# Patient Record
Sex: MALE | Race: WHITE | NOT HISPANIC OR LATINO | Employment: OTHER | URBAN - METROPOLITAN AREA
[De-identification: names, ages, dates, MRNs, and addresses within clinical notes are randomized per-mention and may not be internally consistent; named-entity substitution may affect disease eponyms.]

---

## 2017-02-27 ENCOUNTER — DOCUMENTATION (OUTPATIENT)
Dept: NEUROSURGERY | Facility: CLINIC | Age: 72
End: 2017-02-27

## 2017-02-27 DIAGNOSIS — M48.062 SPINAL STENOSIS, LUMBAR REGION, WITH NEUROGENIC CLAUDICATION: Primary | ICD-10-CM

## 2017-02-27 NOTE — PROGRESS NOTES
Iman, we saw Mr. Main a few years ago with severe spinal stenosis.  He call me at home yesterday and is having worsening symptoms.  I have ordered an MRI scan and I need you to call him to get things scheduled please.  Right now he is fiancé is recuperating from foot surgery so this may have to take place in the upcoming few weeks.  But if you could check on it and get him scheduled when he is able thank you, DC

## 2017-02-27 NOTE — PROGRESS NOTES
Patient is a 71-year-old white male who was seen a few years ago in the neurosurgery office with complaints of back and leg pain.  His workup at that time revealed spinal stenosis with symptoms of neurogenic claudication.  Conservative treatment was elected for the patient secondary to his cardiac pulmonary and GI status at the time.  The patient has called stating that he has been medically stable except for chronic anemia and has progressed with worsening back and leg pain with walking.  He is currently using a walker for assistance but only able to walk approximately 10 steps without having to stop.  Our plan would be to obtain a new MRI scan of the lumbar spine and see the patient in the office for reevaluation.    Malina Irving PA-C

## 2017-03-06 ENCOUNTER — HOSPITAL ENCOUNTER (OUTPATIENT)
Dept: MRI IMAGING | Facility: HOSPITAL | Age: 72
Discharge: HOME | End: 2017-03-06
Admitting: PHYSICIAN ASSISTANT

## 2017-03-06 ENCOUNTER — PREP FOR SURGERY (OUTPATIENT)
Dept: NEUROSURGERY | Facility: CLINIC | Age: 72
End: 2017-03-06

## 2017-03-06 ENCOUNTER — OFFICE VISIT (OUTPATIENT)
Dept: NEUROSURGERY | Facility: CLINIC | Age: 72
End: 2017-03-06

## 2017-03-06 VITALS
HEIGHT: 72 IN | BODY MASS INDEX: 33.4 KG/M2 | SYSTOLIC BLOOD PRESSURE: 180 MMHG | TEMPERATURE: 97.1 F | DIASTOLIC BLOOD PRESSURE: 70 MMHG | WEIGHT: 246.6 LBS

## 2017-03-06 DIAGNOSIS — M48.062 SPINAL STENOSIS, LUMBAR REGION, WITH NEUROGENIC CLAUDICATION: Primary | ICD-10-CM

## 2017-03-06 DIAGNOSIS — M51.36 DEGENERATIVE LUMBAR DISC: ICD-10-CM

## 2017-03-06 DIAGNOSIS — M48.061 SPINAL STENOSIS, LUMBAR: Primary | ICD-10-CM

## 2017-03-06 PROCEDURE — 72148 MRI LUMBAR SPINE W/O DYE: CPT

## 2017-03-06 PROCEDURE — 99214 OFFICE O/P EST MOD 30 MIN: CPT | Performed by: NEUROLOGICAL SURGERY

## 2017-03-06 RX ORDER — RANITIDINE 150 MG/1
150 TABLET ORAL 2 TIMES DAILY
Status: ON HOLD | COMMUNITY
End: 2022-11-30

## 2017-03-06 RX ORDER — GABAPENTIN 300 MG/1
300 CAPSULE ORAL 3 TIMES DAILY
COMMUNITY
End: 2018-07-31

## 2017-03-06 RX ORDER — HYDROCODONE BITARTRATE AND ACETAMINOPHEN 7.5; 325 MG/1; MG/1
1 TABLET ORAL ONCE
Status: CANCELLED | OUTPATIENT
Start: 2017-03-06

## 2017-03-06 RX ORDER — PANTOPRAZOLE SODIUM 40 MG/1
40 TABLET, DELAYED RELEASE ORAL 2 TIMES DAILY
COMMUNITY

## 2017-03-06 RX ORDER — SODIUM CHLORIDE, SODIUM LACTATE, POTASSIUM CHLORIDE, CALCIUM CHLORIDE 600; 310; 30; 20 MG/100ML; MG/100ML; MG/100ML; MG/100ML
100 INJECTION, SOLUTION INTRAVENOUS CONTINUOUS
Status: CANCELLED | OUTPATIENT
Start: 2017-03-06

## 2017-03-06 RX ORDER — ACETAMINOPHEN 325 MG/1
650 TABLET ORAL ONCE
Status: CANCELLED | OUTPATIENT
Start: 2017-03-06 | End: 2017-03-06

## 2017-03-06 RX ORDER — OXYBUTYNIN CHLORIDE 5 MG/1
5 TABLET ORAL NIGHTLY
Status: ON HOLD | COMMUNITY
End: 2022-11-30

## 2017-03-06 RX ORDER — IBUPROFEN 200 MG
800 TABLET ORAL ONCE
Status: CANCELLED | OUTPATIENT
Start: 2017-03-06 | End: 2017-03-06

## 2017-03-06 RX ORDER — HYDROCODONE BITARTRATE AND ACETAMINOPHEN 7.5; 325 MG/1; MG/1
1 TABLET ORAL EVERY 6 HOURS PRN
Qty: 45 TABLET | Refills: 0 | Status: SHIPPED | OUTPATIENT
Start: 2017-03-06 | End: 2017-04-19 | Stop reason: SDUPTHER

## 2017-03-06 RX ORDER — CHLORHEXIDINE GLUCONATE 4 G/100ML
SOLUTION TOPICAL
Qty: 120 ML | Refills: 0 | Status: SHIPPED | OUTPATIENT
Start: 2017-03-06 | End: 2018-05-08

## 2017-03-06 NOTE — PROGRESS NOTES
"Sai Main  1945  [id number]      Chief Complaint   Patient presents with   • Back Pain      hips   • Leg Pain       HISTORY OF PRESENT ILLNESS:   This is a 71-year-old male who is well known to our service who presents with increasing neurogenic claudication.  He was last seen 8/26/2014 at which time it was recommended that he undergo a laminectomy from L1 to S1.  He did not wish to pursue surgery at that time, however his symptoms have gotten to the point that he wishes to do so.  He has significant claudication in both lower extremities.    He has a recent only undergone infection in his leg of MRSA.  He apparently is \"clean\" at this time.  He has concurrent illnesses which increase the risk of laminectomy including congestive heart failure, diabetes and COPD.  I reviewed all the risks of surgery with him the most significant of which is MRSA infection    Past Medical History   Diagnosis Date   • Anemia    • Arthritis    • Bleeding disorder    • CHF (congestive heart failure)    • Diabetes mellitus    • Hernia    • History of transfusion    • Hypertension    • Low back pain        Past Surgical History   Procedure Laterality Date   • Cervical spine surgery         Family History   Problem Relation Age of Onset   • Heart disease Mother    • Heart disease Father        Social History     Social History   • Marital status:      Spouse name: N/A   • Number of children: N/A   • Years of education: N/A     Occupational History   • Not on file.     Social History Main Topics   • Smoking status: Former Smoker   • Smokeless tobacco: Not on file   • Alcohol use Yes      Comment: rarely   • Drug use: No   • Sexual activity: Defer     Other Topics Concern   • Not on file     Social History Narrative   • No narrative on file       Allergies   Allergen Reactions   • Levaquin [Levofloxacin]    • Morphine And Related          Current Outpatient Prescriptions:   •  gabapentin (NEURONTIN) 300 MG capsule, Take 300 " mg by mouth 3 (Three) Times a Day., Disp: , Rfl:   •  oxybutynin (DITROPAN) 5 MG tablet, Take 5 mg by mouth 3 (Three) Times a Day., Disp: , Rfl:   •  pantoprazole (PROTONIX) 40 MG EC tablet, Take 40 mg by mouth Daily., Disp: , Rfl:   •  raNITIdine (ZANTAC) 150 MG tablet, Take 150 mg by mouth 2 (Two) Times a Day., Disp: , Rfl:   •  fluconazole (DIFLUCAN) 100 MG tablet, Take 100 mg by mouth Daily., Disp: , Rfl:   •  sodium-potassium bicarbonate (JERICHO-SELTZER GOLD) effervescent tablet, Take 2 tablets by mouth Every 4 (Four) Hours As Needed., Disp: , Rfl:     Review of Systems   Constitutional: Positive for activity change and fatigue. Negative for appetite change, chills, diaphoresis, fever and unexpected weight change.   HENT: Positive for congestion, postnasal drip, rhinorrhea and tinnitus. Negative for dental problem, drooling, ear discharge, ear pain, facial swelling, hearing loss, mouth sores, nosebleeds, sinus pressure, sneezing, sore throat, trouble swallowing and voice change.    Eyes: Positive for photophobia. Negative for pain, discharge, redness, itching and visual disturbance.   Respiratory: Positive for cough, shortness of breath and wheezing. Negative for apnea, choking, chest tightness and stridor.    Cardiovascular: Negative for chest pain, palpitations and leg swelling.   Gastrointestinal: Positive for blood in stool, constipation and diarrhea. Negative for abdominal distention, abdominal pain, anal bleeding, nausea, rectal pain and vomiting.   Endocrine: Positive for cold intolerance and heat intolerance. Negative for polydipsia, polyphagia and polyuria.   Genitourinary: Positive for difficulty urinating, frequency and penile pain. Negative for decreased urine volume, dysuria, enuresis, flank pain, genital sores, hematuria and urgency.   Musculoskeletal: Positive for arthralgias, back pain, myalgias, neck pain and neck stiffness. Negative for gait problem and joint swelling.   Skin: Positive for rash.  "Negative for color change, pallor and wound.   Allergic/Immunologic: Positive for environmental allergies. Negative for food allergies and immunocompromised state.   Neurological: Positive for light-headedness. Negative for dizziness, tremors, seizures, syncope, facial asymmetry, speech difficulty, weakness, numbness and headaches.   Hematological: Negative for adenopathy. Bruises/bleeds easily.   Psychiatric/Behavioral: Positive for dysphoric mood. Negative for agitation, behavioral problems, confusion, decreased concentration, hallucinations, self-injury, sleep disturbance and suicidal ideas. The patient is nervous/anxious. The patient is not hyperactive.    All other systems reviewed and are negative.      Neurological Examination:    Vitals:    03/06/17 1613   BP: 180/70   BP Location: Right arm   Patient Position: Sitting   Cuff Size: Adult   Temp: 97.1 °F (36.2 °C)   TempSrc: Temporal Artery    Weight: 246 lb 9.6 oz (112 kg)   Height: 72\" (182.9 cm)       Mental status/speech: The patient is alert and oriented.  Speech is clear without aphysia or dysarthria.  No overt cognitive deficits.    Cranial nerve examination:    Olfaction: Smell is intact.  Vision: Vision is intact without visual field abnormalities.  Funduscopic examination is normal.  No pupillary irregularity.  Ocular motor examination: The extraocular muscles are intact.  There is no diplopia.  The pupil is round and reactive to both light and accommodation.  There is no nystagmus.  Facial movement/sensation: There is no facial weakness.  Sensation is intact in the first, second, and third divisions of the trigeminal nerve.  The corneal reflex is intact.  Auditory: Hearing is intact to finger rub bilaterally.  Cranial nerves IX, X, XI, XII: Phonation is normal.  No dysphagia.  Tongue is protruded in the midline without atrophy.  The gag reflex is intact.  Shoulder shrug is normal.    Musculoligamentous ligamentous examination: [ He has virtually no " movement in his spine.  Straight leg raising causes back pain.  He is areflexic.  He has spotty sensory loss.  He has generalized deconditioning and weakness but no focality]    Medical Decision Making:     Diagnostic Data Set:  [Lumbar MRI shows high-grade spinal stenosis L2 through S1 ]      Assessment:  [Neurogenic claudication secondary to spinal stenosis ]          Recommendations:  [I've recommended laminectomies of L2, L3, L4 and possibly L5.  He will call when he is made his decision and we will proceed accordingly. ]        I greatly appreciate the opportunity to see and evaluate this individual.  If you have questions or concerns regarding issues that I may have overlooked please call me at any time: [phone].  Nikolay Cantu M.D.  [facility]  [address] [place] [address]    Scribed for Jose Cantu MD by Ross Ash CMA. 3/6/2017  4:47 PM     I have read and concur with the information provided by the scribe.  Jose Cantu MD

## 2017-03-06 NOTE — H&P
"Sai Main  1945  [id number]             Chief Complaint   Patient presents with   • Back Pain       hips   • Leg Pain         HISTORY OF PRESENT ILLNESS: This is a 71-year-old male who is well known to our service who presents with increasing neurogenic claudication. He was last seen 8/26/2014 at which time it was recommended that he undergo a laminectomy from L1 to S1. He did not wish to pursue surgery at that time, however his symptoms have gotten to the point that he wishes to do so. He has significant claudication in both lower extremities.     He has a recent only undergone infection in his leg of MRSA. He apparently is \"clean\" at this time. He has concurrent illnesses which increase the risk of laminectomy including congestive heart failure, diabetes and COPD. I reviewed all the risks of surgery with him the most significant of which is MRSA infection      Medical History         Past Medical History   Diagnosis Date   • Anemia     • Arthritis     • Bleeding disorder     • CHF (congestive heart failure)     • Diabetes mellitus     • Hernia     • History of transfusion     • Hypertension     • Low back pain               Surgical History          Past Surgical History   Procedure Laterality Date   • Cervical spine surgery                      Family History   Problem Relation Age of Onset   • Heart disease Mother     • Heart disease Father            Social History    Social History            Social History   • Marital status:        Spouse name: N/A   • Number of children: N/A   • Years of education: N/A          Occupational History   • Not on file.              Social History Main Topics    • Smoking status: Former Smoker    • Smokeless tobacco: Not on file    • Alcohol use Yes         Comment: rarely    • Drug use: No    • Sexual activity: Defer            Other Topics Concern   • Not on file          Social History Narrative   • No narrative on file                 Allergies   Allergen " Reactions   • Levaquin [Levofloxacin]     • Morphine And Related              Current Outpatient Prescriptions:   • gabapentin (NEURONTIN) 300 MG capsule, Take 300 mg by mouth 3 (Three) Times a Day., Disp: , Rfl:   • oxybutynin (DITROPAN) 5 MG tablet, Take 5 mg by mouth 3 (Three) Times a Day., Disp: , Rfl:   • pantoprazole (PROTONIX) 40 MG EC tablet, Take 40 mg by mouth Daily., Disp: , Rfl:   • raNITIdine (ZANTAC) 150 MG tablet, Take 150 mg by mouth 2 (Two) Times a Day., Disp: , Rfl:   • fluconazole (DIFLUCAN) 100 MG tablet, Take 100 mg by mouth Daily., Disp: , Rfl:   • sodium-potassium bicarbonate (JERICHO-SELTZER GOLD) effervescent tablet, Take 2 tablets by mouth Every 4 (Four) Hours As Needed., Disp: , Rfl:      Review of Systems   Constitutional: Positive for activity change and fatigue. Negative for appetite change, chills, diaphoresis, fever and unexpected weight change.   HENT: Positive for congestion, postnasal drip, rhinorrhea and tinnitus. Negative for dental problem, drooling, ear discharge, ear pain, facial swelling, hearing loss, mouth sores, nosebleeds, sinus pressure, sneezing, sore throat, trouble swallowing and voice change.   Eyes: Positive for photophobia. Negative for pain, discharge, redness, itching and visual disturbance.   Respiratory: Positive for cough, shortness of breath and wheezing. Negative for apnea, choking, chest tightness and stridor.   Cardiovascular: Negative for chest pain, palpitations and leg swelling.   Gastrointestinal: Positive for blood in stool, constipation and diarrhea. Negative for abdominal distention, abdominal pain, anal bleeding, nausea, rectal pain and vomiting.   Endocrine: Positive for cold intolerance and heat intolerance. Negative for polydipsia, polyphagia and polyuria.   Genitourinary: Positive for difficulty urinating, frequency and penile pain. Negative for decreased urine volume, dysuria, enuresis, flank pain, genital sores, hematuria and urgency.  "  Musculoskeletal: Positive for arthralgias, back pain, myalgias, neck pain and neck stiffness. Negative for gait problem and joint swelling.   Skin: Positive for rash. Negative for color change, pallor and wound.   Allergic/Immunologic: Positive for environmental allergies. Negative for food allergies and immunocompromised state.   Neurological: Positive for light-headedness. Negative for dizziness, tremors, seizures, syncope, facial asymmetry, speech difficulty, weakness, numbness and headaches.   Hematological: Negative for adenopathy. Bruises/bleeds easily.   Psychiatric/Behavioral: Positive for dysphoric mood. Negative for agitation, behavioral problems, confusion, decreased concentration, hallucinations, self-injury, sleep disturbance and suicidal ideas. The patient is nervous/anxious. The patient is not hyperactive.   All other systems reviewed and are negative.        Neurological Examination:      Vitals        Vitals:     03/06/17 1613   BP: 180/70   BP Location: Right arm   Patient Position: Sitting   Cuff Size: Adult   Temp: 97.1 °F (36.2 °C)   TempSrc: Temporal Artery    Weight: 246 lb 9.6 oz (112 kg)   Height: 72\" (182.9 cm)            Mental status/speech: The patient is alert and oriented. Speech is clear without aphysia or dysarthria. No overt cognitive deficits.     Cranial nerve examination:     Olfaction: Smell is intact.  Vision: Vision is intact without visual field abnormalities. Funduscopic examination is normal. No pupillary irregularity.  Ocular motor examination: The extraocular muscles are intact. There is no diplopia. The pupil is round and reactive to both light and accommodation. There is no nystagmus.  Facial movement/sensation: There is no facial weakness. Sensation is intact in the first, second, and third divisions of the trigeminal nerve. The corneal reflex is intact.  Auditory: Hearing is intact to finger rub bilaterally.  Cranial nerves IX, X, XI, XII: Phonation is normal. No " dysphagia. Tongue is protruded in the midline without atrophy. The gag reflex is intact. Shoulder shrug is normal.     Musculoligamentous ligamentous examination: [ He has virtually no movement in his spine. Straight leg raising causes back pain. He is areflexic. He has spotty sensory loss. He has generalized deconditioning and weakness but no focality     Medical Decision Making:     Diagnostic Data Set: Lumbar MRI shows high-grade spinal stenosis L2 through S1      Assessment: Neurogenic claudication secondary to spinal stenosis       Recommendations: I've recommended laminectomies of L2, L3, L4 and possibly L5. He will call when he is made his decision and we will proceed accordingly.

## 2017-03-21 ENCOUNTER — TELEPHONE (OUTPATIENT)
Dept: NEUROSURGERY | Facility: CLINIC | Age: 72
End: 2017-03-21

## 2017-03-21 NOTE — TELEPHONE ENCOUNTER
Provider:  Pepe  Caller: Patient  Time of call:   3:46  Phone #:[phone]  Surgery:    Surgery Date:    Last visit:   03/06/17    ANILA:         Reason for call:    Patient states that he still wants to have surgery, however he has to post-pone it until after a wedding.  He said he would be available around the 3rd week of June.     Coco in scheduling notified.

## 2017-04-19 DIAGNOSIS — M48.061 SPINAL STENOSIS, LUMBAR: ICD-10-CM

## 2017-04-19 DIAGNOSIS — M51.36 DEGENERATIVE LUMBAR DISC: ICD-10-CM

## 2017-04-19 RX ORDER — HYDROCODONE BITARTRATE AND ACETAMINOPHEN 7.5; 325 MG/1; MG/1
1 TABLET ORAL EVERY 6 HOURS PRN
Qty: 45 TABLET | Refills: 0 | Status: SHIPPED | OUTPATIENT
Start: 2017-04-19 | End: 2018-05-08

## 2017-04-19 RX ORDER — BISACODYL 5 MG/1
5 TABLET, DELAYED RELEASE ORAL DAILY PRN
Qty: 30 TABLET | Refills: 0 | Status: SHIPPED | OUTPATIENT
Start: 2017-04-19 | End: 2018-05-08

## 2017-04-19 NOTE — TELEPHONE ENCOUNTER
Provider:  Pepe  Caller: Sai  Time of call:   10:32  Phone #:  [phone]  Surgery:  Lumbar Decompression L5-S1  Surgery Date:  Unknown date  Last visit:   3/6/17  Next visit: not scheduled    ANILA:       03/07/2017 Hydrocodone/Acetaminophen  325MG/7.5MG  1945 [address] Jose Cantu [facility] [phone]  [facility] [address]    Reason for call:       Refill on Hydrocodone, pt also requesting laxative for constipation from pain medication

## 2017-05-16 ENCOUNTER — TELEPHONE (OUTPATIENT)
Dept: NEUROSURGERY | Facility: CLINIC | Age: 72
End: 2017-05-16

## 2017-05-19 ENCOUNTER — OFFICE VISIT (OUTPATIENT)
Dept: NEUROLOGY | Facility: CLINIC | Age: 72
End: 2017-05-19

## 2017-05-19 ENCOUNTER — DOCUMENTATION (OUTPATIENT)
Dept: NEUROLOGY | Facility: CLINIC | Age: 72
End: 2017-05-19

## 2017-05-19 VITALS
WEIGHT: 259 LBS | BODY MASS INDEX: 35.08 KG/M2 | DIASTOLIC BLOOD PRESSURE: 58 MMHG | HEIGHT: 72 IN | SYSTOLIC BLOOD PRESSURE: 120 MMHG

## 2017-05-19 DIAGNOSIS — R47.89 EPISODE OF CHANGE IN SPEECH: Primary | ICD-10-CM

## 2017-05-19 PROCEDURE — 99204 OFFICE O/P NEW MOD 45 MIN: CPT | Performed by: PHYSICIAN ASSISTANT

## 2017-05-19 RX ORDER — GABAPENTIN 300 MG/1
300 CAPSULE ORAL 2 TIMES DAILY
COMMUNITY
End: 2018-07-05 | Stop reason: HOSPADM

## 2017-05-19 RX ORDER — GLYBURIDE-METFORMIN HYDROCHLORIDE 5; 500 MG/1; MG/1
2 TABLET ORAL 2 TIMES DAILY WITH MEALS
COMMUNITY

## 2017-05-19 RX ORDER — SIMVASTATIN 80 MG
80 TABLET ORAL NIGHTLY
COMMUNITY
Start: 2017-03-06

## 2017-05-19 RX ORDER — SERTRALINE HYDROCHLORIDE 100 MG/1
150 TABLET, FILM COATED ORAL NIGHTLY
COMMUNITY
End: 2018-07-31

## 2017-05-19 RX ORDER — ZOLPIDEM TARTRATE 10 MG/1
10 TABLET ORAL NIGHTLY PRN
COMMUNITY
Start: 2017-04-21

## 2017-05-19 RX ORDER — NABUMETONE 750 MG/1
750 TABLET, FILM COATED ORAL 2 TIMES DAILY PRN
COMMUNITY
Start: 2017-03-06

## 2017-05-19 RX ORDER — FUROSEMIDE 20 MG/1
20 TABLET ORAL DAILY
COMMUNITY

## 2017-05-19 RX ORDER — SERTRALINE HYDROCHLORIDE 100 MG/1
100 TABLET, FILM COATED ORAL EVERY MORNING
COMMUNITY

## 2017-05-19 RX ORDER — CARVEDILOL 3.12 MG/1
3.12 TABLET ORAL 2 TIMES DAILY WITH MEALS
COMMUNITY

## 2017-05-19 RX ORDER — FINASTERIDE 5 MG/1
5 TABLET, FILM COATED ORAL DAILY
Status: ON HOLD | COMMUNITY
End: 2022-11-30

## 2017-05-19 RX ORDER — TAMSULOSIN HYDROCHLORIDE 0.4 MG/1
1 CAPSULE ORAL 2 TIMES DAILY
COMMUNITY

## 2017-05-19 RX ORDER — LISINOPRIL 20 MG/1
20 TABLET ORAL DAILY
Status: ON HOLD | COMMUNITY
End: 2022-11-30

## 2017-05-22 ENCOUNTER — TELEPHONE (OUTPATIENT)
Dept: NEUROLOGY | Facility: CLINIC | Age: 72
End: 2017-05-22

## 2017-06-16 ENCOUNTER — APPOINTMENT (OUTPATIENT)
Dept: PREADMISSION TESTING | Facility: HOSPITAL | Age: 72
End: 2017-06-16

## 2018-04-18 DIAGNOSIS — M54.10 RADICULAR PAIN OF BOTH LOWER EXTREMITIES: ICD-10-CM

## 2018-04-18 DIAGNOSIS — G89.29 CHRONIC BILATERAL LOW BACK PAIN WITH BILATERAL SCIATICA: ICD-10-CM

## 2018-04-18 DIAGNOSIS — M48.062 SPINAL STENOSIS, LUMBAR REGION, WITH NEUROGENIC CLAUDICATION: Primary | ICD-10-CM

## 2018-04-18 DIAGNOSIS — M51.36 DDD (DEGENERATIVE DISC DISEASE), LUMBAR: ICD-10-CM

## 2018-04-18 DIAGNOSIS — M54.41 CHRONIC BILATERAL LOW BACK PAIN WITH BILATERAL SCIATICA: ICD-10-CM

## 2018-04-18 DIAGNOSIS — M19.90 ARTHRITIS: ICD-10-CM

## 2018-04-18 DIAGNOSIS — M47.16 OSTEOARTHRITIS OF LUMBAR SPINE WITH MYELOPATHY: ICD-10-CM

## 2018-04-18 DIAGNOSIS — M54.42 CHRONIC BILATERAL LOW BACK PAIN WITH BILATERAL SCIATICA: ICD-10-CM

## 2018-04-26 ENCOUNTER — OFFICE VISIT (OUTPATIENT)
Dept: NEUROSURGERY | Facility: CLINIC | Age: 73
End: 2018-04-26

## 2018-04-26 ENCOUNTER — APPOINTMENT (OUTPATIENT)
Dept: MRI IMAGING | Facility: HOSPITAL | Age: 73
End: 2018-04-26

## 2018-04-26 ENCOUNTER — HOSPITAL ENCOUNTER (OUTPATIENT)
Dept: MRI IMAGING | Facility: HOSPITAL | Age: 73
Discharge: HOME | End: 2018-04-26
Admitting: PHYSICIAN ASSISTANT

## 2018-04-26 ENCOUNTER — HOSPITAL ENCOUNTER (OUTPATIENT)
Dept: GENERAL RADIOLOGY | Facility: HOSPITAL | Age: 73
Discharge: HOME | End: 2018-04-26

## 2018-04-26 VITALS
SYSTOLIC BLOOD PRESSURE: 160 MMHG | DIASTOLIC BLOOD PRESSURE: 80 MMHG | WEIGHT: 266 LBS | TEMPERATURE: 97.3 F | BODY MASS INDEX: 36.03 KG/M2 | HEIGHT: 72 IN

## 2018-04-26 DIAGNOSIS — M48.062 SPINAL STENOSIS OF LUMBAR REGION WITH NEUROGENIC CLAUDICATION: Primary | ICD-10-CM

## 2018-04-26 DIAGNOSIS — M48.062 SPINAL STENOSIS, LUMBAR REGION, WITH NEUROGENIC CLAUDICATION: ICD-10-CM

## 2018-04-26 DIAGNOSIS — M51.36 DDD (DEGENERATIVE DISC DISEASE), LUMBAR: ICD-10-CM

## 2018-04-26 DIAGNOSIS — M54.41 CHRONIC BILATERAL LOW BACK PAIN WITH BILATERAL SCIATICA: ICD-10-CM

## 2018-04-26 DIAGNOSIS — M54.42 CHRONIC BILATERAL LOW BACK PAIN WITH BILATERAL SCIATICA: ICD-10-CM

## 2018-04-26 DIAGNOSIS — G89.29 CHRONIC BILATERAL LOW BACK PAIN WITH BILATERAL SCIATICA: ICD-10-CM

## 2018-04-26 DIAGNOSIS — M47.16 OSTEOARTHRITIS OF LUMBAR SPINE WITH MYELOPATHY: ICD-10-CM

## 2018-04-26 DIAGNOSIS — M19.90 ARTHRITIS: ICD-10-CM

## 2018-04-26 DIAGNOSIS — M54.10 RADICULAR PAIN OF BOTH LOWER EXTREMITIES: ICD-10-CM

## 2018-04-26 PROCEDURE — 72114 X-RAY EXAM L-S SPINE BENDING: CPT

## 2018-04-26 PROCEDURE — 99213 OFFICE O/P EST LOW 20 MIN: CPT | Performed by: NEUROLOGICAL SURGERY

## 2018-04-26 PROCEDURE — 72148 MRI LUMBAR SPINE W/O DYE: CPT

## 2018-04-26 NOTE — PROGRESS NOTES
Sai Main  1945  [id number]                       CURRENT WORKING DIAGNOSIS:  Neurogenic claudication          MEDICAL HISTORY SINCE LAST ENCOUNTER:  This is a 72-year-old male we have seen in the past with spinal stenosis manifest as neurogenic claudication.  We have recommended laminectomies in the past.  He is referred non-surgical treatment.  He presents now with increasing symptoms            Past Medical History:   Diagnosis Date   • Anemia    • Arthritis    • Bleeding disorder    • CHF (congestive heart failure)    • DDD (degenerative disc disease), lumbar    • Diabetes mellitus    • DJD (degenerative joint disease), lumbar    • Hernia    • History of transfusion    • Hypertension    • Low back pain    • Neurogenic claudication due to lumbar spinal stenosis    • Radiculopathy with lower extremity symptoms    • Spinal stenosis of lumbar region 2014              Past Surgical History:   Procedure Laterality Date   • CERVICAL SPINE SURGERY                Family History   Problem Relation Age of Onset   • Heart disease Mother    • Heart disease Father               Social History     Social History   • Marital status: Significant Other     Spouse name: N/A   • Number of children: N/A   • Years of education: N/A     Occupational History   • Not on file.     Social History Main Topics   • Smoking status: Former Smoker   • Smokeless tobacco: Not on file   • Alcohol use Yes      Comment: rarely   • Drug use: No   • Sexual activity: Defer     Other Topics Concern   • Not on file     Social History Narrative   • No narrative on file              Allergies   Allergen Reactions   • Levaquin [Levofloxacin]    • Morphine And Related               Current Outpatient Prescriptions:   •  bisacodyl (DULCOLAX) 5 MG EC tablet, Take 1 tablet by mouth Daily As Needed for Constipation., Disp: 30 tablet, Rfl: 0  •  carvedilol (COREG) 3.125 MG tablet, Take 3.125 mg by mouth 2 (Two) Times a Day With Meals., Disp: , Rfl:   •   chlorhexidine (HIBICLENS) 4 % external liquid, Shower each day with solution for 5 days beginning 5 days before surgery., Disp: 120 mL, Rfl: 0  •  finasteride (PROSCAR) 5 MG tablet, Take 5 mg by mouth Daily., Disp: , Rfl:   •  fluconazole (DIFLUCAN) 100 MG tablet, Take 100 mg by mouth Daily., Disp: , Rfl:   •  fluticasone (VERAMYST) 27.5 MCG/SPRAY nasal spray, 2 sprays into each nostril Daily., Disp: , Rfl:   •  furosemide (LASIX) 20 MG tablet, Take 20 mg by mouth 2 (Two) Times a Day., Disp: , Rfl:   •  gabapentin (NEURONTIN) 300 MG capsule, Take 300 mg by mouth 3 (Three) Times a Day., Disp: , Rfl:   •  gabapentin (NEURONTIN) 300 MG capsule, Take 300 mg by mouth 3 (Three) Times a Day., Disp: , Rfl:   •  glyBURIDE-metFORMIN (GLUCOVANCE) 5-500 MG per tablet, Take 1 tablet by mouth Daily With Breakfast., Disp: , Rfl:   •  HYDROcodone-acetaminophen (NORCO) 7.5-325 MG per tablet, Take 1 tablet by mouth Every 6 (Six) Hours As Needed for Moderate Pain (4-6)., Disp: 45 tablet, Rfl: 0  •  insulin NPH (humuLIN N,novoLIN N) 100 UNIT/ML injection, Inject  under the skin 2 (Two) Times a Day Before Meals., Disp: , Rfl:   •  ipratropium-albuterol (DUO-NEB) 0.5-2.5 mg/mL nebulizer, Take 3 mL by nebulization Every 4 (Four) Hours As Needed for Wheezing., Disp: , Rfl:   •  lisinopril (PRINIVIL,ZESTRIL) 20 MG tablet, Take 20 mg by mouth Daily., Disp: , Rfl:   •  nabumetone (RELAFEN) 750 MG tablet, , Disp: , Rfl:   •  oxybutynin (DITROPAN) 5 MG tablet, Take 5 mg by mouth 3 (Three) Times a Day., Disp: , Rfl:   •  pantoprazole (PROTONIX) 40 MG EC tablet, Take 40 mg by mouth Daily., Disp: , Rfl:   •  raNITIdine (ZANTAC) 150 MG tablet, Take 150 mg by mouth 2 (Two) Times a Day., Disp: , Rfl:   •  sertraline (ZOLOFT) 100 MG tablet, Take 50 mg by mouth Daily., Disp: , Rfl:   •  sertraline (ZOLOFT) 100 MG tablet, Take 100 mg by mouth Daily., Disp: , Rfl:   •  simvastatin (ZOCOR) 80 MG tablet, , Disp: , Rfl:   •  sodium-potassium bicarbonate  (JERICHO-SELTZER GOLD) effervescent tablet, Take 2 tablets by mouth Every 4 (Four) Hours As Needed., Disp: , Rfl:   •  tamsulosin (FLOMAX) 0.4 MG capsule 24 hr capsule, Take 1 capsule by mouth Every Night., Disp: , Rfl:   •  zolpidem (AMBIEN) 10 MG tablet, , Disp: , Rfl:          Review of Systems   Constitutional: Negative for activity change, appetite change, chills, diaphoresis, fatigue, fever and unexpected weight change.   HENT: Negative for congestion, dental problem, drooling, ear discharge, ear pain, facial swelling, hearing loss, mouth sores, nosebleeds, postnasal drip, rhinorrhea, sinus pressure, sneezing, sore throat, tinnitus, trouble swallowing and voice change.    Eyes: Negative for photophobia, pain, discharge, redness, itching and visual disturbance.   Respiratory: Negative for apnea, cough, choking, chest tightness, shortness of breath, wheezing and stridor.    Cardiovascular: Negative for chest pain, palpitations and leg swelling.   Gastrointestinal: Negative for abdominal distention, abdominal pain, anal bleeding, blood in stool, constipation, diarrhea, nausea, rectal pain and vomiting.   Endocrine: Negative for cold intolerance, heat intolerance, polydipsia, polyphagia and polyuria.   Genitourinary: Negative for decreased urine volume, difficulty urinating, dysuria, enuresis, flank pain, frequency, genital sores, hematuria and urgency.   Musculoskeletal: Positive for back pain, myalgias and neck stiffness. Negative for arthralgias, gait problem, joint swelling and neck pain.   Skin: Negative for color change, pallor, rash and wound.   Allergic/Immunologic: Negative for environmental allergies, food allergies and immunocompromised state.   Neurological: Positive for weakness, light-headedness and numbness. Negative for dizziness, tremors, seizures, syncope, facial asymmetry, speech difficulty and headaches.   Hematological: Negative for adenopathy. Does not bruise/bleed easily.  "  Psychiatric/Behavioral: Negative for agitation, behavioral problems, confusion, decreased concentration, dysphoric mood, hallucinations, self-injury, sleep disturbance and suicidal ideas. The patient is not nervous/anxious and is not hyperactive.                Vitals:    04/26/18 1310   BP: 160/80   Temp: 97.3 °F (36.3 °C)   Weight: 121 kg (266 lb)   Height: 182.9 cm (72.01\")               EXAMINATION: He has limitation range of motion lumbar spine.  Straight leg raise [place] and flip test are negative.  He is areflexic.  He has slight weakness of his hip flexor.  He has pain with palpation of lumbar region.            MEDICAL DECISION MAKING: The lumbar MRI shows high-grade spinal stenosis with significant degenerative osteoarthritic changes L2 through and including L5.  He has marked disc space narrowing L2-L3, L3-L4 and L5-S1 with Modic changes.           ASSESSMENT/DISPOSITION: [I've recommended laminectomies.  However I cannot give him complete assurance that it will alleviate all of his back pain.  I've suggested a second opinion with Dr. Matthews as to whether or not spinal fusion would be necessitated or simple laminectomy.  I've also referred him to Dr. dorman for pain management.  He is seeing him in the past. ]              I APPRECIATE THE OPPORTUNITY OF THIS REFERRAL. PLEASE CALL IF ANY       QUESTIONS [phone]    Scribed for Jose Cantu MD by Ross Ash CMA. 4/26/2018  1:25 PM    I have read and concur with the information provided by the scribe.  Jose Cantu MD      "

## 2018-04-26 NOTE — PROGRESS NOTES
Sai Main  1945  [id number]      Chief Complaint   Patient presents with   • Back Pain       HISTORY OF PRESENT ILLNESS:  [ ]    Past Medical History:   Diagnosis Date   • Anemia    • Arthritis    • Bleeding disorder    • CHF (congestive heart failure)    • DDD (degenerative disc disease), lumbar    • Diabetes mellitus    • DJD (degenerative joint disease), lumbar    • Hernia    • History of transfusion    • Hypertension    • Low back pain    • Neurogenic claudication due to lumbar spinal stenosis    • Radiculopathy with lower extremity symptoms    • Spinal stenosis of lumbar region 2014       Past Surgical History:   Procedure Laterality Date   • CERVICAL SPINE SURGERY         Family History   Problem Relation Age of Onset   • Heart disease Mother    • Heart disease Father        Social History     Social History   • Marital status: Significant Other     Spouse name: N/A   • Number of children: N/A   • Years of education: N/A     Occupational History   • Not on file.     Social History Main Topics   • Smoking status: Former Smoker   • Smokeless tobacco: Not on file   • Alcohol use Yes      Comment: rarely   • Drug use: No   • Sexual activity: Defer     Other Topics Concern   • Not on file     Social History Narrative   • No narrative on file       Allergies   Allergen Reactions   • Levaquin [Levofloxacin]    • Morphine And Related          Current Outpatient Prescriptions:   •  bisacodyl (DULCOLAX) 5 MG EC tablet, Take 1 tablet by mouth Daily As Needed for Constipation., Disp: 30 tablet, Rfl: 0  •  carvedilol (COREG) 3.125 MG tablet, Take 3.125 mg by mouth 2 (Two) Times a Day With Meals., Disp: , Rfl:   •  chlorhexidine (HIBICLENS) 4 % external liquid, Shower each day with solution for 5 days beginning 5 days before surgery., Disp: 120 mL, Rfl: 0  •  finasteride (PROSCAR) 5 MG tablet, Take 5 mg by mouth Daily., Disp: , Rfl:   •  fluconazole (DIFLUCAN) 100 MG tablet, Take 100 mg by mouth Daily., Disp: ,  Rfl:   •  fluticasone (VERAMYST) 27.5 MCG/SPRAY nasal spray, 2 sprays into each nostril Daily., Disp: , Rfl:   •  furosemide (LASIX) 20 MG tablet, Take 20 mg by mouth 2 (Two) Times a Day., Disp: , Rfl:   •  gabapentin (NEURONTIN) 300 MG capsule, Take 300 mg by mouth 3 (Three) Times a Day., Disp: , Rfl:   •  gabapentin (NEURONTIN) 300 MG capsule, Take 300 mg by mouth 3 (Three) Times a Day., Disp: , Rfl:   •  glyBURIDE-metFORMIN (GLUCOVANCE) 5-500 MG per tablet, Take 1 tablet by mouth Daily With Breakfast., Disp: , Rfl:   •  HYDROcodone-acetaminophen (NORCO) 7.5-325 MG per tablet, Take 1 tablet by mouth Every 6 (Six) Hours As Needed for Moderate Pain (4-6)., Disp: 45 tablet, Rfl: 0  •  insulin NPH (humuLIN N,novoLIN N) 100 UNIT/ML injection, Inject  under the skin 2 (Two) Times a Day Before Meals., Disp: , Rfl:   •  ipratropium-albuterol (DUO-NEB) 0.5-2.5 mg/mL nebulizer, Take 3 mL by nebulization Every 4 (Four) Hours As Needed for Wheezing., Disp: , Rfl:   •  lisinopril (PRINIVIL,ZESTRIL) 20 MG tablet, Take 20 mg by mouth Daily., Disp: , Rfl:   •  nabumetone (RELAFEN) 750 MG tablet, , Disp: , Rfl:   •  oxybutynin (DITROPAN) 5 MG tablet, Take 5 mg by mouth 3 (Three) Times a Day., Disp: , Rfl:   •  pantoprazole (PROTONIX) 40 MG EC tablet, Take 40 mg by mouth Daily., Disp: , Rfl:   •  raNITIdine (ZANTAC) 150 MG tablet, Take 150 mg by mouth 2 (Two) Times a Day., Disp: , Rfl:   •  sertraline (ZOLOFT) 100 MG tablet, Take 50 mg by mouth Daily., Disp: , Rfl:   •  sertraline (ZOLOFT) 100 MG tablet, Take 100 mg by mouth Daily., Disp: , Rfl:   •  simvastatin (ZOCOR) 80 MG tablet, , Disp: , Rfl:   •  sodium-potassium bicarbonate (JERICHO-SELTZER GOLD) effervescent tablet, Take 2 tablets by mouth Every 4 (Four) Hours As Needed., Disp: , Rfl:   •  tamsulosin (FLOMAX) 0.4 MG capsule 24 hr capsule, Take 1 capsule by mouth Every Night., Disp: , Rfl:   •  zolpidem (AMBIEN) 10 MG tablet, , Disp: , Rfl:     Review of Systems    Constitutional: Negative for activity change, appetite change, chills, diaphoresis, fatigue, fever and unexpected weight change.   HENT: Negative for congestion, dental problem, drooling, ear discharge, ear pain, facial swelling, hearing loss, mouth sores, nosebleeds, postnasal drip, rhinorrhea, sinus pressure, sneezing, sore throat, tinnitus, trouble swallowing and voice change.    Eyes: Negative for photophobia, pain, discharge, redness, itching and visual disturbance.   Respiratory: Negative for apnea, cough, choking, chest tightness, shortness of breath, wheezing and stridor.    Cardiovascular: Negative for chest pain, palpitations and leg swelling.   Gastrointestinal: Negative for abdominal distention, abdominal pain, anal bleeding, blood in stool, constipation, diarrhea, nausea, rectal pain and vomiting.   Endocrine: Negative for cold intolerance, heat intolerance, polydipsia, polyphagia and polyuria.   Genitourinary: Negative for decreased urine volume, difficulty urinating, dysuria, enuresis, flank pain, frequency, genital sores, hematuria and urgency.   Musculoskeletal: Positive for back pain, myalgias and neck stiffness. Negative for arthralgias, gait problem, joint swelling and neck pain.   Skin: Negative for color change, pallor, rash and wound.   Allergic/Immunologic: Negative for environmental allergies, food allergies and immunocompromised state.   Neurological: Positive for weakness, light-headedness and numbness. Negative for dizziness, tremors, seizures, syncope, facial asymmetry, speech difficulty and headaches.   Hematological: Negative for adenopathy. Does not bruise/bleed easily.   Psychiatric/Behavioral: Negative for agitation, behavioral problems, confusion, decreased concentration, dysphoric mood, hallucinations, self-injury, sleep disturbance and suicidal ideas. The patient is not nervous/anxious and is not hyperactive.        Vitals:    04/26/18 1310   BP: 160/80   Temp: 97.3 °F (36.3  "°C)   Weight: 121 kg (266 lb)   Height: 182.9 cm (72.01\")       Neurological Examination:            Medical Decision Making:     Diagnostic Data Set:  [ ]      Assessment:  [ ]          Recommendations:  [ ]        I greatly appreciate the opportunity to see and evaluate this individual.  If you have questions or concerns regarding issues that I may have overlooked please call me at any time: [phone].  Nikolay Cantu M.D.  [facility]  [address] [place] [address]    "

## 2018-04-30 ENCOUNTER — TELEPHONE (OUTPATIENT)
Dept: PAIN MEDICINE | Facility: CLINIC | Age: 73
End: 2018-04-30

## 2018-05-06 PROBLEM — M47.816 SPONDYLOSIS OF LUMBAR REGION WITHOUT MYELOPATHY OR RADICULOPATHY: Status: ACTIVE | Noted: 2018-05-06

## 2018-05-06 PROBLEM — I50.9 CONGESTIVE HEART FAILURE (HCC): Status: ACTIVE | Noted: 2018-05-06

## 2018-05-06 PROBLEM — IMO0001 IDDM (INSULIN DEPENDENT DIABETES MELLITUS): Status: ACTIVE | Noted: 2018-05-06

## 2018-05-06 NOTE — PROGRESS NOTES
"Chief Complaint: \"Pain in my lower back and legs.\"    History of Present Illness:   Patient: Mr. Sai Main, 72 y.o. male   Referring physician: Dr. Jose Cantu   Reason for referral: Consultation for intractable chronic lower back pain.   Pain history: Patient reports a 14-year history of lower back pain, which began without incident. Pain has progressed in intensity over the past 2 years.   Pain description: constant pain with intermittent exacerbation, described as stabbing and throbbing sensation.   Radiation of pain: The lower back pain radiates into the gluteal region and posterior aspect of the legs all the way down to his feet (plantar and dorsal aspect).  Pain intensity today: 7/10  Average pain intensity last week: 10/10  Pain intensity ranges from: 7/10 to 10/10  Aggravating factors: Pain increases with any type of movement, standing or walking for about 1 minute.  Alleviating factors: Pain decreases with lying down  Associated symptoms:   Patient reports pain, numbness and weakness in the lower extremities.   Patient denies any new bladder or bowel problems.   Patient reports difficulties with his balance and recent falls.      Review of previous therapies and additional medical records:  Sai Main has already failed the following measures, including:   Conservative measures: oral analgesics, massage, and physical therapy   Interventional measures: Patient underwent some procedures with me more than six years ago at my former practice at [facility] (records are not available for review)  Surgical measures: No history of lumbar spine surgery   aSi Main underwent neurosurgical consultation with Dr. Jose Cantu on 04/26/2018, and was found to be a potential surgical candidate for lumbar decompression.  Patient has been scheduled for second opinion with Dr. Anthony Matthews on 05/15/2018  Sai Main presents with significant comorbidities including anxiety, depression and insomnia, engaged in " treatment, hypertension, insulin dependant diabetes, congestive heart failure, CAD s/p CABG/stents, engaged in treatment.  In terms of current analgesics, Sai Main takes: Tylenol and nabumetone, gabapentin, sertraline, zolpidem   I have reviewed [facility] Report #[id number] consistent to medication reconciliation.    Global Pain Scale 5-8-18          Pain 22          Feelings 13          Clinical outcomes 14          Activities 25          GPS Total: 74            Review of Diagnostic Studies:    MRI Lumbar Spine, 04/26/2018. There are multilevel degenerative changes within the lumbar spine with retrolisthesis seen of L3 on L4 and L4 on L5. Extensive degenerative changes seen within the endplates of the L2/L3 and L5/S1 levels. Normal signal intensity seen within the conus. The spinal cord terminates at  the T12/L1 disc space. No abnormal mass or fluid collection seen within the paraspinal muscles. Axial imaging:  L2-L3: severe narrowing of the neural foramina on the right with moderate central spinal canal stenosis. There is a large posterior disc osteophyte complex with degenerative changes seen within the posterior facets.  L3-L4: severe central spinal canal stenosis with fatty hypertrophy posteriorly as well as extensive degenerative changes seen within the posterior facets and thickening of the posterior ligament of flavum. There is a large broad-based disc  bulge creating narrowing of the neural foramina bilaterally. Posterior osteophyte complex    L4-L5: severe central spinal canal stenosis creating compromise of the nerve roots. Narrowing of the neural foramina bilaterally with severe degenerative changes within the posterior facets.   L5-S1: severe central spinal canal stenosis with narrowing of the neural foramina bilaterally. No significant nerve root compromise identified.  X-Ray Lumbar Spine, 04/18/2018: Multiple views of the lumbar spine including flexion and extension views reveal at neutral there is  minimal retrolisthesis of L3  on L4 and L4 on L5. Grade 1 anterolisthesis of L5 on S1.  In flexion there is pronounced retrolisthesis of L2 on L3 additionally noted increase from neutral imaging without significant change in alignment of the lower segments, however, in extension imaging there is increased retrolisthesis of L2 on L3, L3 on L4 and L4 on L5 with approximately 3 mm increase at the L3-L4 level and 4 mm increase at the L4-L5 level of retrolisthesis in extension imaging. Multilevel degenerative changes with intervertebral disc height space loss greatest at the L2-L3 level and L5-S1 level with bridging osteophytes and facet arthropathy as well as osseous neuroforaminal stenosis at the L5-S1 level, however, no evidence for discrete fracture. Lateral oblique imaging demonstrates abnormal lucency at the pars interarticularis, left greater than right, concerning for bilateral pars defects at the L5 level. SI joints symmetric.    Review of Systems   Constitutional: Positive for activity change and fatigue.   Cardiovascular: Positive for leg swelling.   Musculoskeletal: Positive for arthralgias, back pain, gait problem, joint swelling, myalgias and neck pain.   Neurological: Positive for weakness.   All other systems reviewed and are negative.     Patient Active Problem List   Diagnosis   • Episode of change in speech   • Spinal stenosis of lumbar region with neurogenic claudication   • Radicular pain of both lower extremities   • Chronic bilateral low back pain with bilateral sciatica   • DDD (degenerative disc disease), lumbar   • Spondylosis of lumbar region without myelopathy or radiculopathy   • Congestive heart failure   • IDDM (insulin dependent diabetes mellitus)   • Insomnia   • Physical deconditioning   • Anxiety and depression   • At high risk for falls   • Gait disturbance   • Diabetic polyneuropathy associated with type 1 diabetes mellitus   • Left foot drop   • Moderate obesity     Past Medical  History:   Diagnosis Date   • Anemia    • Arthritis    • Bleeding disorder    • CHF (congestive heart failure)    • DDD (degenerative disc disease), lumbar    • Diabetes mellitus    • DJD (degenerative joint disease), lumbar    • Hernia    • History of transfusion    • Hypertension    • Low back pain    • Neurogenic claudication due to lumbar spinal stenosis    • Radiculopathy with lower extremity symptoms    • Spinal stenosis of lumbar region 2014         Past Surgical History:   Procedure Laterality Date   • CERVICAL SPINE SURGERY           Family History   Problem Relation Age of Onset   • Heart disease Mother    • Heart disease Father          Social History     Social History   • Marital status: Significant Other     Social History Main Topics   • Smoking status: Former Smoker   • Alcohol use Yes      Comment: rarely   • Drug use: No   • Sexual activity: Defer     Other Topics Concern   • Not on file           Current Outpatient Prescriptions:   •  albuterol (PROVENTIL HFA;VENTOLIN HFA) 108 (90 Base) MCG/ACT inhaler, Inhale 2 puffs Every 4 (Four) Hours As Needed for Wheezing., Disp: , Rfl:   •  carvedilol (COREG) 3.125 MG tablet, Take 3.125 mg by mouth 2 (Two) Times a Day With Meals., Disp: , Rfl:   •  Ergocalciferol (VITAMIN D2) 400 units tablet, Take  by mouth., Disp: , Rfl:   •  finasteride (PROSCAR) 5 MG tablet, Take 5 mg by mouth Daily., Disp: , Rfl:   •  fluticasone (VERAMYST) 27.5 MCG/SPRAY nasal spray, 2 sprays into each nostril Daily., Disp: , Rfl:   •  folic acid (FOLVITE) 400 MCG tablet, Take 800 mcg by mouth Daily., Disp: , Rfl:   •  furosemide (LASIX) 20 MG tablet, Take 20 mg by mouth 2 (Two) Times a Day., Disp: , Rfl:   •  gabapentin (NEURONTIN) 300 MG capsule, Take 300 mg by mouth 3 (Three) Times a Day., Disp: , Rfl:   •  gabapentin (NEURONTIN) 300 MG capsule, Take 300 mg by mouth 3 (Three) Times a Day., Disp: , Rfl:   •  glucagon (GLUCAGON EMERGENCY) 1 MG injection, Inject 1 mg under the skin 1  (One) Time As Needed for Low Blood Sugar., Disp: , Rfl:   •  glyBURIDE-metFORMIN (GLUCOVANCE) 5-500 MG per tablet, Take 1 tablet by mouth Daily With Breakfast., Disp: , Rfl:   •  guaiFENesin 200 MG tablet, Take 400 mg by mouth Every 4 (Four) Hours As Needed for Cough., Disp: , Rfl:   •  insulin NPH (humuLIN N,novoLIN N) 100 UNIT/ML injection, Inject  under the skin 2 (Two) Times a Day Before Meals., Disp: , Rfl:   •  ipratropium-albuterol (DUO-NEB) 0.5-2.5 mg/mL nebulizer, Take 3 mL by nebulization Every 4 (Four) Hours As Needed for Wheezing., Disp: , Rfl:   •  lisinopril (PRINIVIL,ZESTRIL) 20 MG tablet, Take 20 mg by mouth Daily., Disp: , Rfl:   •  melatonin 5 MG tablet tablet, Take 5 mg by mouth., Disp: , Rfl:   •  Multiple Vitamins-Minerals (CENTRUM SILVER 50+MEN PO), Take  by mouth., Disp: , Rfl:   •  nabumetone (RELAFEN) 750 MG tablet, , Disp: , Rfl:   •  nitroglycerin (NITROSTAT) 0.4 MG SL tablet, Place 0.4 mg under the tongue Every 5 (Five) Minutes As Needed for Chest Pain. Take no more than 3 doses in 15 minutes., Disp: , Rfl:   •  oxybutynin (DITROPAN) 5 MG tablet, Take 5 mg by mouth 3 (Three) Times a Day., Disp: , Rfl:   •  pantoprazole (PROTONIX) 40 MG EC tablet, Take 40 mg by mouth Daily., Disp: , Rfl:   •  potassium gluconate 595 (99 K) MG tablet tablet, Take 595 mg by mouth Daily., Disp: , Rfl:   •  sertraline (ZOLOFT) 100 MG tablet, Take 100 mg by mouth Daily., Disp: , Rfl:   •  sertraline (ZOLOFT) 100 MG tablet, Take 50 mg by mouth Daily., Disp: , Rfl:   •  simvastatin (ZOCOR) 80 MG tablet, , Disp: , Rfl:   •  tamsulosin (FLOMAX) 0.4 MG capsule 24 hr capsule, Take 1 capsule by mouth Every Night., Disp: , Rfl:   •  vitamin A 60034 UNIT capsule, Take 10,000 Units by mouth Daily., Disp: , Rfl:   •  zolpidem (AMBIEN) 10 MG tablet, , Disp: , Rfl:   •  raNITIdine (ZANTAC) 150 MG tablet, Take 150 mg by mouth 2 (Two) Times a Day., Disp: , Rfl:       Allergies   Allergen Reactions   • Levaquin [Levofloxacin]  "Itching     Itching, breathing and b/p problems   • Morphine And Related Hallucinations     Hallucinations, shortness of breath, b/p       /78 (BP Location: Right arm)   Pulse 71   Temp 96.9 °F (36.1 °C) (Temporal Artery )   Resp 18   Ht 182.9 cm (72\")   Wt 119 kg (262 lb 6.4 oz)   SpO2 96%   BMI 35.59 kg/m²       Physical Exam:  Constitutional: Patient is oriented to person, place, and time. Vital signs are normal. Patient appears well-developed and well-nourished.   HEENT: Head: Normocephalic and atraumatic. Eyes: Conjunctivae and lids are normal. Pupils: Equal, round, reactive to light.   Neck: Trachea normal. Neck supple. No JVD present.   Pulmonary Respiratory effort: No increased work of breathing or signs of respiratory distress. Auscultation of lungs: Clear to auscultation.   Cardiovascular Auscultation of heart: Normal rate and rhythm, normal S1 and S2, 2+ murmur.   Abdomen: The abdomen was soft and nontender. Bowel sounds were normal.   Musculoskeletal   Gait and station: Gait evaluation demonstrated shuffling   Lumbar spine: The range of motion of the lumbar spine is limited secondary to pain. Extension, flexion, lateral flexion, rotation of the lumbar spine increased and reproduced pain. Lumbar facet joint loading maneuvers are positive.  Les and Gaenslen's tests are negative   Piriformis maneuvers are negative   Palpation of the bilateral ischial tuberosities, unrevealing   Palpation of the bilateral greater trochanter, unrevealing   Examination of the Iliotibial band: unrevealing   Hip joints: The range of motion of the hip joints is full and without pain   Neurological: Patient is alert and oriented to person, place, and time. Speech: speech is normal. Cortical function: Normal mental status.   Cranial nerves: Cranial nerves 2-12 intact.   Reflex Scores:  Right brachioradialis: 2+  Left brachioradialis: 2+  Right biceps: 2+  Left biceps: 2+  Right triceps: 2+  Left triceps: 2+  Right " patellar: 2+  Left patellar: 2+  Right achilles: 2+  Left achilles: 2+  Motor strength: 5/5, except for left foot dorsiflexion 3+ over 5   Motor Tone: normal tone.   Involuntary movements: none.   Superficial/Primitive Reflexes: primitive reflexes were absent.   Right Hay: absent  Left Hay: absent  Right ankle clonus: absent  Left ankle clonus: absent   Negative long tract signs. Straight leg raising test is negative. Femoral stretch sign is negative.   Sensation: No sensory loss. Sensory exam: intact to light touch, intact pain and temperature sensation, intact vibration sensation and normal proprioception.   Coordination: Normal finger to nose and heel to shin. Normal balance and negative. Romberg's sign negative.   Skin and subcutaneous tissue: Skin is warm and intact. No rash noted. No cyanosis.   Psychiatric: Judgment and insight: Normal. Orientation to person, place and time: Normal. Recent and remote memory: Intact. Mood and affect: Normal.     ASSESSMENT:   1. Spinal stenosis of lumbar region with neurogenic claudication    2. Spondylosis of lumbar region without myelopathy or radiculopathy    3. DDD (degenerative disc disease), lumbar    4. Congestive heart failure, unspecified congestive heart failure chronicity, unspecified congestive heart failure type    5. Diabetic polyneuropathy associated with type 1 diabetes mellitus    6. IDDM (insulin dependent diabetes mellitus)    7. Moderate obesity    8. Anxiety and depression    9. Insomnia, unspecified type    10. Left foot drop    11. Gait disturbance    12. At high risk for falls    13. Physical deconditioning      PLAN/MEDICAL DECISION MAKING:  I had a lengthy conversation with Mr. Sai Main regarding his chronic pain condition and potential therapeutic options including risks, benefits, alternative therapies, to name a few. Patient has failed to obtain pain relief with conservative measures, as referenced above. Lumbar MRI revealed  multilevel high-grade lumabr spinal stenosis with significant degenerative osteoarthritic changes from L2 through L5. There is marked disc space narrowing at L2-L3, L3-L4 and L5-S1 with Modic changes. Patient has been found to be a potential candidate for decompression and possible lumbar fusion. I have reviewed all available patient's medical records as well as previous therapies as referenced above.  Therefore, I have proposed the following plan:  1. Diagnostic studies: EMG/NCV of the bilateral lower extremities   2. Pharmacological measures: Reviewed. Discussed. Patient takes Tylenol and nabumetone, gabapentin, sertraline, zolpidem   3. Interventional pain management measures: Patient will be scheduled for diagnostic and therapeutic bilateral L4-L5 transforaminal epidural steroid injections. We may repeat another epidural depending on patient's outcome.  Patient is to undergo second surgical opinion with Dr. Matthews next week to determine as to whether or not spinal fusion would be necessitated or simple laminectomy. Otherwise, patient would be a candidate for a spinal cord stimulator trial   4. Long-term rehabilitation efforts:  A. The patient has a history of falls. I did complete a risk assessment for falls. Fall precautions: Patient has been instructed regarding universal fall precautions, such as;   · Using gait aids a  cane or a rolling walker at the appropriate height at all times for ambulation   · Removing all area rugs and coffee tables to create a safe environment at home  · Ensure clean, dry floors  · Wearing supportive footwear and properly fitting clothing  · Ensure bed/chair is appropriate height and patient's feet can touch the floor  · Using a shower transfer bench  · Using walk-in shower and having shower safety bars installed  · Ensure proper lighting, minimize glare  · Have nightlights operational and in use  · Participation in an exercise program for gait training, balance training and  strength  · Ensure proper compliance and organization of medications to avoid errors   · Avoid use of over the counter sedatives and alcohol consumption  · Ensure easy access to call bell, glasses, TV control, telephone  · Ensure glasses/hearing aids are in use or close by (on top of night table)  B. Patient will start a comprehensive physical therapy program for water therapy, core strengthening, gait and balance training, neurodynamics, myofascial release, cupping and dry needling once pain is under control  C. Start an exercise program such as water therapy  D. AFO to correct left foot drop  E. Contrast therapy: Apply ice-packs for 15-20 minutes, followed by heating pads for 15-20 minutes to affected area   F. Referral to [facility]  5. The patient and his family have been instructed to contact my office with any questions or difficulties. The patient understands the plan and agrees to proceed accordingly.       Patient Care Team:  Jose Pereira MD as PCP - General (Internal Medicine)  Jose Cantu MD as Consulting Physician (Neurosurgery)  Scooby Baires MD as Consulting Physician (Pain Medicine)  Malina Irving PA-C as Physician Assistant (Neurosurgery)  Sary Jacobs PA-C as Physician Assistant (Physician Assistant)     No orders of the defined types were placed in this encounter.        No future appointments.      Scooby Baires MD     EMR Dragon/Transcription disclaimer:  Much of this encounter note is an electronic transcription of spoken language to printed text. Electronic transcription of spoken language may permit erroneous, or at times, nonsensical words or phrases to be inadvertently transcribed. Although I have reviewed the note for such errors, some may still exist.

## 2018-05-08 ENCOUNTER — OFFICE VISIT (OUTPATIENT)
Dept: PAIN MEDICINE | Facility: CLINIC | Age: 73
End: 2018-05-08

## 2018-05-08 VITALS
BODY MASS INDEX: 35.54 KG/M2 | HEIGHT: 72 IN | RESPIRATION RATE: 18 BRPM | HEART RATE: 71 BPM | OXYGEN SATURATION: 96 % | WEIGHT: 262.4 LBS | TEMPERATURE: 96.9 F | DIASTOLIC BLOOD PRESSURE: 78 MMHG | SYSTOLIC BLOOD PRESSURE: 128 MMHG

## 2018-05-08 DIAGNOSIS — M51.36 DDD (DEGENERATIVE DISC DISEASE), LUMBAR: ICD-10-CM

## 2018-05-08 DIAGNOSIS — Z91.81 AT HIGH RISK FOR FALLS: ICD-10-CM

## 2018-05-08 DIAGNOSIS — R53.81 PHYSICAL DECONDITIONING: ICD-10-CM

## 2018-05-08 DIAGNOSIS — M48.062 SPINAL STENOSIS OF LUMBAR REGION WITH NEUROGENIC CLAUDICATION: Primary | ICD-10-CM

## 2018-05-08 DIAGNOSIS — IMO0001 IDDM (INSULIN DEPENDENT DIABETES MELLITUS): ICD-10-CM

## 2018-05-08 DIAGNOSIS — G47.00 INSOMNIA, UNSPECIFIED TYPE: ICD-10-CM

## 2018-05-08 DIAGNOSIS — F41.9 ANXIETY AND DEPRESSION: ICD-10-CM

## 2018-05-08 DIAGNOSIS — E10.42 DIABETIC POLYNEUROPATHY ASSOCIATED WITH TYPE 1 DIABETES MELLITUS (HCC): ICD-10-CM

## 2018-05-08 DIAGNOSIS — E66.8 MODERATE OBESITY: ICD-10-CM

## 2018-05-08 DIAGNOSIS — M21.372 LEFT FOOT DROP: ICD-10-CM

## 2018-05-08 DIAGNOSIS — M48.062 SPINAL STENOSIS OF LUMBAR REGION WITH NEUROGENIC CLAUDICATION: ICD-10-CM

## 2018-05-08 DIAGNOSIS — M47.816 SPONDYLOSIS OF LUMBAR REGION WITHOUT MYELOPATHY OR RADICULOPATHY: ICD-10-CM

## 2018-05-08 DIAGNOSIS — I50.9 CONGESTIVE HEART FAILURE, UNSPECIFIED CONGESTIVE HEART FAILURE CHRONICITY, UNSPECIFIED CONGESTIVE HEART FAILURE TYPE: ICD-10-CM

## 2018-05-08 DIAGNOSIS — F32.A ANXIETY AND DEPRESSION: ICD-10-CM

## 2018-05-08 DIAGNOSIS — R26.9 GAIT DISTURBANCE: ICD-10-CM

## 2018-05-08 PROCEDURE — 99204 OFFICE O/P NEW MOD 45 MIN: CPT | Performed by: ANESTHESIOLOGY

## 2018-05-08 RX ORDER — ALBUTEROL SULFATE 90 UG/1
2 AEROSOL, METERED RESPIRATORY (INHALATION) EVERY 4 HOURS PRN
COMMUNITY

## 2018-05-08 RX ORDER — LANOLIN ALCOHOL/MO/W.PET/CERES
800 CREAM (GRAM) TOPICAL DAILY
COMMUNITY

## 2018-05-08 RX ORDER — MAGNESIUM GLUCONATE 30 MG(550)
595 TABLET ORAL DAILY
Status: ON HOLD | COMMUNITY
End: 2022-11-30

## 2018-05-08 RX ORDER — CHOLECALCIFEROL (VITAMIN D3) 125 MCG
5 CAPSULE ORAL NIGHTLY
COMMUNITY

## 2018-05-08 RX ORDER — ERGOCALCIFEROL (VITAMIN D2) 10 MCG
1 TABLET ORAL DAILY
COMMUNITY

## 2018-05-08 RX ORDER — GUAIFENESIN 200 MG/1
400 TABLET ORAL EVERY 4 HOURS PRN
COMMUNITY

## 2018-05-08 RX ORDER — NITROGLYCERIN 0.4 MG/1
0.4 TABLET SUBLINGUAL
COMMUNITY

## 2018-05-09 ENCOUNTER — OUTSIDE FACILITY SERVICE (OUTPATIENT)
Dept: PAIN MEDICINE | Facility: CLINIC | Age: 73
End: 2018-05-09

## 2018-05-09 PROCEDURE — 64483 NJX AA&/STRD TFRM EPI L/S 1: CPT | Performed by: ANESTHESIOLOGY

## 2018-05-09 PROCEDURE — 99152 MOD SED SAME PHYS/QHP 5/>YRS: CPT | Performed by: ANESTHESIOLOGY

## 2018-05-21 ENCOUNTER — PREP FOR SURGERY (OUTPATIENT)
Dept: OTHER | Facility: HOSPITAL | Age: 73
End: 2018-05-21

## 2018-05-21 DIAGNOSIS — E11.49 TYPE 2 DIABETES MELLITUS WITH OTHER NEUROLOGIC COMPLICATION, WITHOUT LONG-TERM CURRENT USE OF INSULIN (HCC): ICD-10-CM

## 2018-05-21 DIAGNOSIS — M51.36 DEGENERATIVE DISC DISEASE, LUMBAR: ICD-10-CM

## 2018-05-21 DIAGNOSIS — M47.26 OSTEOARTHRITIS OF SPINE WITH RADICULOPATHY, LUMBAR REGION: ICD-10-CM

## 2018-05-21 DIAGNOSIS — M48.062 SPINAL STENOSIS, LUMBAR REGION, WITH NEUROGENIC CLAUDICATION: ICD-10-CM

## 2018-05-21 DIAGNOSIS — M47.16 SPONDYLOSIS WITH MYELOPATHY, LUMBAR REGION: Primary | ICD-10-CM

## 2018-05-21 RX ORDER — IBUPROFEN 800 MG/1
800 TABLET ORAL ONCE
Status: CANCELLED | OUTPATIENT
Start: 2018-05-21 | End: 2018-05-21

## 2018-05-21 RX ORDER — SODIUM CHLORIDE 0.9 % (FLUSH) 0.9 %
1-10 SYRINGE (ML) INJECTION AS NEEDED
Status: CANCELLED | OUTPATIENT
Start: 2018-05-21

## 2018-05-21 RX ORDER — CEFAZOLIN SODIUM 2 G/100ML
2 INJECTION, SOLUTION INTRAVENOUS ONCE
Status: CANCELLED | OUTPATIENT
Start: 2018-05-21 | End: 2018-05-21

## 2018-05-21 RX ORDER — SODIUM CHLORIDE, SODIUM LACTATE, POTASSIUM CHLORIDE, CALCIUM CHLORIDE 600; 310; 30; 20 MG/100ML; MG/100ML; MG/100ML; MG/100ML
100 INJECTION, SOLUTION INTRAVENOUS CONTINUOUS
Status: CANCELLED | OUTPATIENT
Start: 2018-05-21

## 2018-05-21 RX ORDER — CHLORHEXIDINE GLUCONATE 4 G/100ML
SOLUTION TOPICAL
Qty: 120 ML | Refills: 0 | Status: ON HOLD | OUTPATIENT
Start: 2018-05-21 | End: 2018-06-22

## 2018-05-21 RX ORDER — ACETAMINOPHEN 325 MG/1
650 TABLET ORAL ONCE
Status: CANCELLED | OUTPATIENT
Start: 2018-05-21 | End: 2018-05-21

## 2018-05-21 RX ORDER — PREGABALIN 150 MG/1
150 CAPSULE ORAL ONCE
Status: CANCELLED | OUTPATIENT
Start: 2018-05-21 | End: 2018-05-21

## 2018-05-21 RX ORDER — HYDROCODONE BITARTRATE AND ACETAMINOPHEN 7.5; 325 MG/1; MG/1
1 TABLET ORAL ONCE
Status: CANCELLED | OUTPATIENT
Start: 2018-05-21 | End: 2018-05-21

## 2018-05-21 NOTE — H&P
Sai Main  1945  [id number]                         CURRENT WORKING DIAGNOSIS:  Neurogenic claudication           MEDICAL HISTORY SINCE LAST ENCOUNTER:  This is a 72-year-old male we have seen in the past with spinal stenosis manifest as neurogenic claudication.  We have recommended laminectomies in the past.  He is referred non-surgical treatment.  He presents now with increasing symptoms             Medical History        Past Medical History:   Diagnosis Date   • Anemia     • Arthritis     • Bleeding disorder     • CHF (congestive heart failure)     • DDD (degenerative disc disease), lumbar     • Diabetes mellitus     • DJD (degenerative joint disease), lumbar     • Hernia     • History of transfusion     • Hypertension     • Low back pain     • Neurogenic claudication due to lumbar spinal stenosis     • Radiculopathy with lower extremity symptoms     • Spinal stenosis of lumbar region 2014                   Surgical History         Past Surgical History:   Procedure Laterality Date   • CERVICAL SPINE SURGERY                             Family History   Problem Relation Age of Onset   • Heart disease Mother     • Heart disease Father                  Social History   Social History            Social History   • Marital status: Significant Other       Spouse name: N/A   • Number of children: N/A   • Years of education: N/A          Occupational History   • Not on file.             Social History Main Topics   • Smoking status: Former Smoker   • Smokeless tobacco: Not on file   • Alcohol use Yes         Comment: rarely   • Drug use: No   • Sexual activity: Defer           Other Topics Concern   • Not on file          Social History Narrative   • No narrative on file                        Allergies   Allergen Reactions   • Levaquin [Levofloxacin]     • Morphine And Related                  Current Outpatient Prescriptions:   •  bisacodyl (DULCOLAX) 5 MG EC tablet, Take 1 tablet by mouth Daily As Needed  for Constipation., Disp: 30 tablet, Rfl: 0  •  carvedilol (COREG) 3.125 MG tablet, Take 3.125 mg by mouth 2 (Two) Times a Day With Meals., Disp: , Rfl:   •  chlorhexidine (HIBICLENS) 4 % external liquid, Shower each day with solution for 5 days beginning 5 days before surgery., Disp: 120 mL, Rfl: 0  •  finasteride (PROSCAR) 5 MG tablet, Take 5 mg by mouth Daily., Disp: , Rfl:   •  fluconazole (DIFLUCAN) 100 MG tablet, Take 100 mg by mouth Daily., Disp: , Rfl:   •  fluticasone (VERAMYST) 27.5 MCG/SPRAY nasal spray, 2 sprays into each nostril Daily., Disp: , Rfl:   •  furosemide (LASIX) 20 MG tablet, Take 20 mg by mouth 2 (Two) Times a Day., Disp: , Rfl:   •  gabapentin (NEURONTIN) 300 MG capsule, Take 300 mg by mouth 3 (Three) Times a Day., Disp: , Rfl:   •  gabapentin (NEURONTIN) 300 MG capsule, Take 300 mg by mouth 3 (Three) Times a Day., Disp: , Rfl:   •  glyBURIDE-metFORMIN (GLUCOVANCE) 5-500 MG per tablet, Take 1 tablet by mouth Daily With Breakfast., Disp: , Rfl:   •  HYDROcodone-acetaminophen (NORCO) 7.5-325 MG per tablet, Take 1 tablet by mouth Every 6 (Six) Hours As Needed for Moderate Pain (4-6)., Disp: 45 tablet, Rfl: 0  •  insulin NPH (humuLIN N,novoLIN N) 100 UNIT/ML injection, Inject  under the skin 2 (Two) Times a Day Before Meals., Disp: , Rfl:   •  ipratropium-albuterol (DUO-NEB) 0.5-2.5 mg/mL nebulizer, Take 3 mL by nebulization Every 4 (Four) Hours As Needed for Wheezing., Disp: , Rfl:   •  lisinopril (PRINIVIL,ZESTRIL) 20 MG tablet, Take 20 mg by mouth Daily., Disp: , Rfl:   •  nabumetone (RELAFEN) 750 MG tablet, , Disp: , Rfl:   •  oxybutynin (DITROPAN) 5 MG tablet, Take 5 mg by mouth 3 (Three) Times a Day., Disp: , Rfl:   •  pantoprazole (PROTONIX) 40 MG EC tablet, Take 40 mg by mouth Daily., Disp: , Rfl:   •  raNITIdine (ZANTAC) 150 MG tablet, Take 150 mg by mouth 2 (Two) Times a Day., Disp: , Rfl:   •  sertraline (ZOLOFT) 100 MG tablet, Take 50 mg by mouth Daily., Disp: , Rfl:   •  sertraline  (ZOLOFT) 100 MG tablet, Take 100 mg by mouth Daily., Disp: , Rfl:   •  simvastatin (ZOCOR) 80 MG tablet, , Disp: , Rfl:   •  sodium-potassium bicarbonate (JERICHO-SELTZER GOLD) effervescent tablet, Take 2 tablets by mouth Every 4 (Four) Hours As Needed., Disp: , Rfl:   •  tamsulosin (FLOMAX) 0.4 MG capsule 24 hr capsule, Take 1 capsule by mouth Every Night., Disp: , Rfl:   •  zolpidem (AMBIEN) 10 MG tablet, , Disp: , Rfl:           Review of Systems   Constitutional: Negative for activity change, appetite change, chills, diaphoresis, fatigue, fever and unexpected weight change.   HENT: Negative for congestion, dental problem, drooling, ear discharge, ear pain, facial swelling, hearing loss, mouth sores, nosebleeds, postnasal drip, rhinorrhea, sinus pressure, sneezing, sore throat, tinnitus, trouble swallowing and voice change.    Eyes: Negative for photophobia, pain, discharge, redness, itching and visual disturbance.   Respiratory: Negative for apnea, cough, choking, chest tightness, shortness of breath, wheezing and stridor.    Cardiovascular: Negative for chest pain, palpitations and leg swelling.   Gastrointestinal: Negative for abdominal distention, abdominal pain, anal bleeding, blood in stool, constipation, diarrhea, nausea, rectal pain and vomiting.   Endocrine: Negative for cold intolerance, heat intolerance, polydipsia, polyphagia and polyuria.   Genitourinary: Negative for decreased urine volume, difficulty urinating, dysuria, enuresis, flank pain, frequency, genital sores, hematuria and urgency.   Musculoskeletal: Positive for back pain, myalgias and neck stiffness. Negative for arthralgias, gait problem, joint swelling and neck pain.   Skin: Negative for color change, pallor, rash and wound.   Allergic/Immunologic: Negative for environmental allergies, food allergies and immunocompromised state.   Neurological: Positive for weakness, light-headedness and numbness. Negative for dizziness, tremors, seizures,  "syncope, facial asymmetry, speech difficulty and headaches.   Hematological: Negative for adenopathy. Does not bruise/bleed easily.   Psychiatric/Behavioral: Negative for agitation, behavioral problems, confusion, decreased concentration, dysphoric mood, hallucinations, self-injury, sleep disturbance and suicidal ideas. The patient is not nervous/anxious and is not hyperactive.                  Vitals       Vitals:     04/26/18 1310   BP: 160/80   Temp: 97.3 °F (36.3 °C)   Weight: 121 kg (266 lb)   Height: 182.9 cm (72.01\")                     EXAMINATION: He has limitation range of motion lumbar spine.  Straight leg raise [place] and flip test are negative.  He is areflexic.  He has slight weakness of his hip flexor.  He has pain with palpation of lumbar region.              MEDICAL DECISION MAKING: The lumbar MRI shows high-grade spinal stenosis with significant degenerative osteoarthritic changes L2 through and including L5.  He has marked disc space narrowing L2-L3, L3-L4 and L5-S1 with Modic changes.             ASSESSMENT/DISPOSITION: [I've recommended laminectomies.  However I cannot give him complete assurance that it will alleviate all of his back pain. Patient has been for second opinion which concurs with Dr. Cantu's recommendations for multilevel laminectomy for decompression of spinal stenosis.  The patient is medically stable and ready for surgical intervention.  The risks of the surgery were discussed with the patient, all of his questions were answered and he is ready to proceed.    Malina Irving PA-C        "

## 2018-05-23 PROBLEM — M48.062 SPINAL STENOSIS, LUMBAR REGION, WITH NEUROGENIC CLAUDICATION: Status: ACTIVE | Noted: 2018-05-23

## 2018-05-23 PROBLEM — M51.36 DEGENERATIVE DISC DISEASE, LUMBAR: Status: ACTIVE | Noted: 2018-05-23

## 2018-05-23 PROBLEM — M47.26 OSTEOARTHRITIS OF SPINE WITH RADICULOPATHY, LUMBAR REGION: Status: ACTIVE | Noted: 2018-05-23

## 2018-05-23 PROBLEM — M47.16 SPONDYLOSIS WITH MYELOPATHY, LUMBAR REGION: Status: ACTIVE | Noted: 2018-05-23

## 2018-06-18 ENCOUNTER — HOSPITAL ENCOUNTER (OUTPATIENT)
Dept: GENERAL RADIOLOGY | Facility: HOSPITAL | Age: 73
Discharge: HOME | End: 2018-06-18
Admitting: PHYSICIAN ASSISTANT

## 2018-06-18 ENCOUNTER — APPOINTMENT (OUTPATIENT)
Dept: PREADMISSION TESTING | Facility: HOSPITAL | Age: 73
End: 2018-06-18

## 2018-06-18 VITALS — BODY MASS INDEX: 34.94 KG/M2 | HEIGHT: 72 IN | WEIGHT: 257.94 LBS

## 2018-06-18 DIAGNOSIS — M47.16 SPONDYLOSIS WITH MYELOPATHY, LUMBAR REGION: ICD-10-CM

## 2018-06-18 DIAGNOSIS — M51.36 DEGENERATIVE DISC DISEASE, LUMBAR: ICD-10-CM

## 2018-06-18 DIAGNOSIS — M48.062 SPINAL STENOSIS, LUMBAR REGION, WITH NEUROGENIC CLAUDICATION: ICD-10-CM

## 2018-06-18 DIAGNOSIS — E11.49 TYPE 2 DIABETES MELLITUS WITH OTHER NEUROLOGIC COMPLICATION, WITHOUT LONG-TERM CURRENT USE OF INSULIN (HCC): ICD-10-CM

## 2018-06-18 DIAGNOSIS — M47.26 OSTEOARTHRITIS OF SPINE WITH RADICULOPATHY, LUMBAR REGION: ICD-10-CM

## 2018-06-18 LAB
ALBUMIN SERPL-MCNC: 4.32 G/DL (ref 3.2–4.8)
ALBUMIN/GLOB SERPL: 1.3 G/DL (ref 1.5–2.5)
ALP SERPL-CCNC: 82 U/L (ref 25–100)
ALT SERPL W P-5'-P-CCNC: 20 U/L (ref 7–40)
ANION GAP SERPL CALCULATED.3IONS-SCNC: 9 MMOL/L (ref 3–11)
AST SERPL-CCNC: 35 U/L (ref 0–33)
BILIRUB SERPL-MCNC: 0.2 MG/DL (ref 0.3–1.2)
BUN BLD-MCNC: 20 MG/DL (ref 9–23)
BUN/CREAT SERPL: 14.3 (ref 7–25)
CALCIUM SPEC-SCNC: 9 MG/DL (ref 8.7–10.4)
CHLORIDE SERPL-SCNC: 104 MMOL/L (ref 99–109)
CO2 SERPL-SCNC: 25 MMOL/L (ref 20–31)
CREAT BLD-MCNC: 1.4 MG/DL (ref 0.6–1.3)
DEPRECATED RDW RBC AUTO: 50.8 FL (ref 37–54)
ERYTHROCYTE [DISTWIDTH] IN BLOOD BY AUTOMATED COUNT: 14.2 % (ref 11.3–14.5)
GFR SERPL CREATININE-BSD FRML MDRD: 50 ML/MIN/1.73
GLOBULIN UR ELPH-MCNC: 3.3 GM/DL
GLUCOSE BLD-MCNC: 96 MG/DL (ref 70–100)
HBA1C MFR BLD: 5.7 % (ref 4.8–5.6)
HCT VFR BLD AUTO: 33.1 % (ref 38.9–50.9)
HGB BLD-MCNC: 10.9 G/DL (ref 13.1–17.5)
MCH RBC QN AUTO: 32.5 PG (ref 27–31)
MCHC RBC AUTO-ENTMCNC: 32.9 G/DL (ref 32–36)
MCV RBC AUTO: 98.8 FL (ref 80–99)
PLATELET # BLD AUTO: 220 10*3/MM3 (ref 150–450)
PMV BLD AUTO: 8.6 FL (ref 6–12)
POTASSIUM BLD-SCNC: 5 MMOL/L (ref 3.5–5.5)
PROT SERPL-MCNC: 7.6 G/DL (ref 5.7–8.2)
RBC # BLD AUTO: 3.35 10*6/MM3 (ref 4.2–5.76)
SODIUM BLD-SCNC: 138 MMOL/L (ref 132–146)
WBC NRBC COR # BLD: 4.32 10*3/MM3 (ref 3.5–10.8)

## 2018-06-18 PROCEDURE — 87081 CULTURE SCREEN ONLY: CPT | Performed by: PHYSICIAN ASSISTANT

## 2018-06-18 PROCEDURE — 80053 COMPREHEN METABOLIC PANEL: CPT | Performed by: PHYSICIAN ASSISTANT

## 2018-06-18 PROCEDURE — 71046 X-RAY EXAM CHEST 2 VIEWS: CPT

## 2018-06-18 PROCEDURE — 83036 HEMOGLOBIN GLYCOSYLATED A1C: CPT | Performed by: PHYSICIAN ASSISTANT

## 2018-06-18 PROCEDURE — 85027 COMPLETE CBC AUTOMATED: CPT | Performed by: PHYSICIAN ASSISTANT

## 2018-06-18 PROCEDURE — 36415 COLL VENOUS BLD VENIPUNCTURE: CPT

## 2018-06-18 RX ORDER — IPRATROPIUM BROMIDE 42 UG/1
2 SPRAY, METERED NASAL 3 TIMES DAILY
Status: ON HOLD | COMMUNITY
End: 2022-11-30

## 2018-06-18 NOTE — DISCHARGE INSTRUCTIONS

## 2018-06-20 LAB — MRSA SPEC QL CULT: NORMAL

## 2018-06-21 ENCOUNTER — ANESTHESIA EVENT (OUTPATIENT)
Dept: PERIOP | Facility: HOSPITAL | Age: 73
End: 2018-06-21

## 2018-06-22 ENCOUNTER — APPOINTMENT (OUTPATIENT)
Dept: GENERAL RADIOLOGY | Facility: HOSPITAL | Age: 73
End: 2018-06-22

## 2018-06-22 ENCOUNTER — ANESTHESIA (OUTPATIENT)
Dept: PERIOP | Facility: HOSPITAL | Age: 73
End: 2018-06-22

## 2018-06-22 ENCOUNTER — HOSPITAL ENCOUNTER (INPATIENT)
Facility: HOSPITAL | Age: 73
LOS: 13 days | Discharge: NURSING, REHAB OR LONG-TERM CARE | End: 2018-07-05
Attending: NEUROLOGICAL SURGERY | Admitting: NEUROLOGICAL SURGERY

## 2018-06-22 DIAGNOSIS — Z74.09 IMPAIRED MOBILITY AND ADLS: ICD-10-CM

## 2018-06-22 DIAGNOSIS — Z78.9 IMPAIRED MOBILITY AND ADLS: ICD-10-CM

## 2018-06-22 DIAGNOSIS — Z74.09 IMPAIRED FUNCTIONAL MOBILITY, BALANCE, GAIT, AND ENDURANCE: Primary | ICD-10-CM

## 2018-06-22 PROBLEM — M47.9 SPONDYLOSIS: Status: ACTIVE | Noted: 2018-06-22

## 2018-06-22 LAB
GLUCOSE BLDC GLUCOMTR-MCNC: 117 MG/DL (ref 70–130)
GLUCOSE BLDC GLUCOMTR-MCNC: 133 MG/DL (ref 70–130)
GLUCOSE BLDC GLUCOMTR-MCNC: 205 MG/DL (ref 70–130)
POTASSIUM BLDA-SCNC: 5.32 MMOL/L (ref 3.5–5.3)

## 2018-06-22 PROCEDURE — 25010000002 FENTANYL CITRATE (PF) 100 MCG/2ML SOLUTION: Performed by: NURSE ANESTHETIST, CERTIFIED REGISTERED

## 2018-06-22 PROCEDURE — 63047 LAM FACETEC & FORAMOT LUMBAR: CPT | Performed by: NEUROLOGICAL SURGERY

## 2018-06-22 PROCEDURE — 01NB0ZZ RELEASE LUMBAR NERVE, OPEN APPROACH: ICD-10-PCS | Performed by: NEUROLOGICAL SURGERY

## 2018-06-22 PROCEDURE — 25010000002 DEXAMETHASONE PER 1 MG: Performed by: NURSE ANESTHETIST, CERTIFIED REGISTERED

## 2018-06-22 PROCEDURE — 25010000002 HYDROMORPHONE PER 4 MG: Performed by: NEUROLOGICAL SURGERY

## 2018-06-22 PROCEDURE — 63710000001 OXYCODONE-ACETAMINOPHEN 7.5-325 MG TABLET: Performed by: NEUROLOGICAL SURGERY

## 2018-06-22 PROCEDURE — 84132 ASSAY OF SERUM POTASSIUM: CPT | Performed by: ANESTHESIOLOGY

## 2018-06-22 PROCEDURE — 25010000002 ONDANSETRON PER 1 MG: Performed by: NURSE ANESTHETIST, CERTIFIED REGISTERED

## 2018-06-22 PROCEDURE — 72020 X-RAY EXAM OF SPINE 1 VIEW: CPT

## 2018-06-22 PROCEDURE — 25010000003 CEFAZOLIN IN DEXTROSE 2-4 GM/100ML-% SOLUTION: Performed by: NEUROLOGICAL SURGERY

## 2018-06-22 PROCEDURE — 82962 GLUCOSE BLOOD TEST: CPT

## 2018-06-22 PROCEDURE — A9270 NON-COVERED ITEM OR SERVICE: HCPCS | Performed by: NEUROLOGICAL SURGERY

## 2018-06-22 PROCEDURE — 63048 LAM FACETEC &FORAMOT EA ADDL: CPT | Performed by: NEUROLOGICAL SURGERY

## 2018-06-22 PROCEDURE — 25010000002 NEOSTIGMINE PER 0.5 MG: Performed by: NURSE ANESTHETIST, CERTIFIED REGISTERED

## 2018-06-22 PROCEDURE — 94799 UNLISTED PULMONARY SVC/PX: CPT

## 2018-06-22 PROCEDURE — 25010000002 PROPOFOL 10 MG/ML EMULSION: Performed by: ANESTHESIOLOGY

## 2018-06-22 PROCEDURE — 25010000002 PHENYLEPHRINE PER 1 ML: Performed by: ANESTHESIOLOGY

## 2018-06-22 PROCEDURE — 25010000002 PHENYLEPHRINE PER 1 ML: Performed by: NURSE ANESTHETIST, CERTIFIED REGISTERED

## 2018-06-22 PROCEDURE — 25810000003 SODIUM CHLORIDE 0.9 % WITH KCL 20 MEQ 20-0.9 MEQ/L-% SOLUTION: Performed by: NEUROLOGICAL SURGERY

## 2018-06-22 RX ORDER — LIDOCAINE HYDROCHLORIDE 10 MG/ML
0.5 INJECTION, SOLUTION EPIDURAL; INFILTRATION; INTRACAUDAL; PERINEURAL ONCE AS NEEDED
Status: COMPLETED | OUTPATIENT
Start: 2018-06-22 | End: 2018-06-22

## 2018-06-22 RX ORDER — FINASTERIDE 5 MG/1
5 TABLET, FILM COATED ORAL DAILY
Status: DISCONTINUED | OUTPATIENT
Start: 2018-06-23 | End: 2018-07-05 | Stop reason: HOSPADM

## 2018-06-22 RX ORDER — SODIUM CHLORIDE 0.9 % (FLUSH) 0.9 %
1-10 SYRINGE (ML) INJECTION AS NEEDED
Status: DISCONTINUED | OUTPATIENT
Start: 2018-06-22 | End: 2018-06-22 | Stop reason: HOSPADM

## 2018-06-22 RX ORDER — GABAPENTIN 300 MG/1
600 CAPSULE ORAL DAILY
Status: DISCONTINUED | OUTPATIENT
Start: 2018-06-23 | End: 2018-06-26

## 2018-06-22 RX ORDER — CEFAZOLIN SODIUM 2 G/100ML
2 INJECTION, SOLUTION INTRAVENOUS EVERY 8 HOURS
Status: COMPLETED | OUTPATIENT
Start: 2018-06-23 | End: 2018-06-23

## 2018-06-22 RX ORDER — GLYCOPYRROLATE 0.2 MG/ML
INJECTION INTRAMUSCULAR; INTRAVENOUS AS NEEDED
Status: DISCONTINUED | OUTPATIENT
Start: 2018-06-22 | End: 2018-06-22 | Stop reason: SURG

## 2018-06-22 RX ORDER — SODIUM CHLORIDE, SODIUM LACTATE, POTASSIUM CHLORIDE, CALCIUM CHLORIDE 600; 310; 30; 20 MG/100ML; MG/100ML; MG/100ML; MG/100ML
INJECTION, SOLUTION INTRAVENOUS CONTINUOUS PRN
Status: DISCONTINUED | OUTPATIENT
Start: 2018-06-22 | End: 2018-06-22

## 2018-06-22 RX ORDER — OXYCODONE AND ACETAMINOPHEN 7.5; 325 MG/1; MG/1
1 TABLET ORAL EVERY 4 HOURS PRN
Status: DISCONTINUED | OUTPATIENT
Start: 2018-06-22 | End: 2018-06-26

## 2018-06-22 RX ORDER — HYDROCODONE BITARTRATE AND ACETAMINOPHEN 7.5; 325 MG/1; MG/1
1 TABLET ORAL ONCE
Status: COMPLETED | OUTPATIENT
Start: 2018-06-22 | End: 2018-06-22

## 2018-06-22 RX ORDER — DEXAMETHASONE SODIUM PHOSPHATE 4 MG/ML
INJECTION, SOLUTION INTRA-ARTICULAR; INTRALESIONAL; INTRAMUSCULAR; INTRAVENOUS; SOFT TISSUE AS NEEDED
Status: DISCONTINUED | OUTPATIENT
Start: 2018-06-22 | End: 2018-06-22 | Stop reason: SURG

## 2018-06-22 RX ORDER — PANTOPRAZOLE SODIUM 40 MG/1
40 TABLET, DELAYED RELEASE ORAL 2 TIMES DAILY
Status: DISCONTINUED | OUTPATIENT
Start: 2018-06-22 | End: 2018-07-05 | Stop reason: HOSPADM

## 2018-06-22 RX ORDER — GUAIFENESIN 200 MG/1
400 TABLET ORAL EVERY 4 HOURS PRN
Status: DISCONTINUED | OUTPATIENT
Start: 2018-06-22 | End: 2018-07-05 | Stop reason: HOSPADM

## 2018-06-22 RX ORDER — ONDANSETRON 2 MG/ML
INJECTION INTRAMUSCULAR; INTRAVENOUS AS NEEDED
Status: DISCONTINUED | OUTPATIENT
Start: 2018-06-22 | End: 2018-06-22 | Stop reason: SURG

## 2018-06-22 RX ORDER — GABAPENTIN 300 MG/1
300 CAPSULE ORAL EVERY 8 HOURS SCHEDULED
Status: DISCONTINUED | OUTPATIENT
Start: 2018-06-22 | End: 2018-06-22

## 2018-06-22 RX ORDER — FAMOTIDINE 20 MG/1
20 TABLET, FILM COATED ORAL 2 TIMES DAILY
Status: DISCONTINUED | OUTPATIENT
Start: 2018-06-22 | End: 2018-07-05 | Stop reason: HOSPADM

## 2018-06-22 RX ORDER — MAGNESIUM HYDROXIDE 1200 MG/15ML
LIQUID ORAL AS NEEDED
Status: DISCONTINUED | OUTPATIENT
Start: 2018-06-22 | End: 2018-06-22 | Stop reason: HOSPADM

## 2018-06-22 RX ORDER — SENNA AND DOCUSATE SODIUM 50; 8.6 MG/1; MG/1
1 TABLET, FILM COATED ORAL NIGHTLY PRN
Status: DISCONTINUED | OUTPATIENT
Start: 2018-06-22 | End: 2018-06-29

## 2018-06-22 RX ORDER — ATORVASTATIN CALCIUM 10 MG/1
10 TABLET, FILM COATED ORAL DAILY
Status: DISCONTINUED | OUTPATIENT
Start: 2018-06-22 | End: 2018-07-05 | Stop reason: HOSPADM

## 2018-06-22 RX ORDER — SERTRALINE HYDROCHLORIDE 100 MG/1
100 TABLET, FILM COATED ORAL EVERY MORNING
Status: DISCONTINUED | OUTPATIENT
Start: 2018-06-23 | End: 2018-07-05 | Stop reason: HOSPADM

## 2018-06-22 RX ORDER — IBUPROFEN 800 MG/1
800 TABLET ORAL ONCE
Status: COMPLETED | OUTPATIENT
Start: 2018-06-22 | End: 2018-06-22

## 2018-06-22 RX ORDER — ONDANSETRON 4 MG/1
4 TABLET, FILM COATED ORAL EVERY 6 HOURS PRN
Status: DISCONTINUED | OUTPATIENT
Start: 2018-06-22 | End: 2018-07-05 | Stop reason: HOSPADM

## 2018-06-22 RX ORDER — FUROSEMIDE 40 MG/1
20 TABLET ORAL DAILY
Status: DISCONTINUED | OUTPATIENT
Start: 2018-06-23 | End: 2018-07-05 | Stop reason: HOSPADM

## 2018-06-22 RX ORDER — ACETAMINOPHEN 325 MG/1
650 TABLET ORAL ONCE
Status: COMPLETED | OUTPATIENT
Start: 2018-06-22 | End: 2018-06-22

## 2018-06-22 RX ORDER — ALBUTEROL SULFATE 2.5 MG/3ML
2.5 SOLUTION RESPIRATORY (INHALATION) EVERY 4 HOURS PRN
Status: DISCONTINUED | OUTPATIENT
Start: 2018-06-22 | End: 2018-07-05 | Stop reason: HOSPADM

## 2018-06-22 RX ORDER — SODIUM CHLORIDE, SODIUM LACTATE, POTASSIUM CHLORIDE, CALCIUM CHLORIDE 600; 310; 30; 20 MG/100ML; MG/100ML; MG/100ML; MG/100ML
100 INJECTION, SOLUTION INTRAVENOUS CONTINUOUS
Status: DISCONTINUED | OUTPATIENT
Start: 2018-06-22 | End: 2018-06-22

## 2018-06-22 RX ORDER — ONDANSETRON 2 MG/ML
4 INJECTION INTRAMUSCULAR; INTRAVENOUS EVERY 6 HOURS PRN
Status: DISCONTINUED | OUTPATIENT
Start: 2018-06-22 | End: 2018-07-05 | Stop reason: HOSPADM

## 2018-06-22 RX ORDER — LISINOPRIL 20 MG/1
20 TABLET ORAL DAILY
Status: DISCONTINUED | OUTPATIENT
Start: 2018-06-23 | End: 2018-07-02

## 2018-06-22 RX ORDER — NITROGLYCERIN 0.4 MG/1
0.4 TABLET SUBLINGUAL
Status: DISCONTINUED | OUTPATIENT
Start: 2018-06-22 | End: 2018-07-05 | Stop reason: HOSPADM

## 2018-06-22 RX ORDER — CEFAZOLIN SODIUM 2 G/100ML
2 INJECTION, SOLUTION INTRAVENOUS ONCE
Status: COMPLETED | OUTPATIENT
Start: 2018-06-22 | End: 2018-06-22

## 2018-06-22 RX ORDER — SODIUM CHLORIDE, SODIUM LACTATE, POTASSIUM CHLORIDE, CALCIUM CHLORIDE 600; 310; 30; 20 MG/100ML; MG/100ML; MG/100ML; MG/100ML
9 INJECTION, SOLUTION INTRAVENOUS CONTINUOUS
Status: DISCONTINUED | OUTPATIENT
Start: 2018-06-22 | End: 2018-06-27

## 2018-06-22 RX ORDER — SODIUM CHLORIDE 9 MG/ML
INJECTION, SOLUTION INTRAVENOUS AS NEEDED
Status: DISCONTINUED | OUTPATIENT
Start: 2018-06-22 | End: 2018-06-22 | Stop reason: HOSPADM

## 2018-06-22 RX ORDER — SODIUM CHLORIDE AND POTASSIUM CHLORIDE 150; 900 MG/100ML; MG/100ML
100 INJECTION, SOLUTION INTRAVENOUS CONTINUOUS
Status: DISCONTINUED | OUTPATIENT
Start: 2018-06-22 | End: 2018-06-29

## 2018-06-22 RX ORDER — OXYBUTYNIN CHLORIDE 5 MG/1
5 TABLET ORAL NIGHTLY
Status: DISCONTINUED | OUTPATIENT
Start: 2018-06-22 | End: 2018-07-05 | Stop reason: HOSPADM

## 2018-06-22 RX ORDER — ACETAMINOPHEN 325 MG/1
650 TABLET ORAL EVERY 4 HOURS PRN
Status: DISCONTINUED | OUTPATIENT
Start: 2018-06-22 | End: 2018-07-05 | Stop reason: HOSPADM

## 2018-06-22 RX ORDER — FAMOTIDINE 20 MG/1
20 TABLET, FILM COATED ORAL ONCE
Status: COMPLETED | OUTPATIENT
Start: 2018-06-22 | End: 2018-06-22

## 2018-06-22 RX ORDER — BUPIVACAINE HYDROCHLORIDE 2.5 MG/ML
INJECTION, SOLUTION EPIDURAL; INFILTRATION; INTRACAUDAL AS NEEDED
Status: DISCONTINUED | OUTPATIENT
Start: 2018-06-22 | End: 2018-06-22 | Stop reason: HOSPADM

## 2018-06-22 RX ORDER — PREGABALIN 150 MG/1
150 CAPSULE ORAL ONCE
Status: COMPLETED | OUTPATIENT
Start: 2018-06-22 | End: 2018-06-22

## 2018-06-22 RX ORDER — GABAPENTIN 300 MG/1
1500 CAPSULE ORAL NIGHTLY
Status: DISCONTINUED | OUTPATIENT
Start: 2018-06-22 | End: 2018-06-25

## 2018-06-22 RX ORDER — PROPOFOL 10 MG/ML
VIAL (ML) INTRAVENOUS AS NEEDED
Status: DISCONTINUED | OUTPATIENT
Start: 2018-06-22 | End: 2018-06-22 | Stop reason: SURG

## 2018-06-22 RX ORDER — NAPROXEN 500 MG/1
500 TABLET ORAL 2 TIMES DAILY WITH MEALS
Status: DISCONTINUED | OUTPATIENT
Start: 2018-06-22 | End: 2018-06-26

## 2018-06-22 RX ORDER — NALOXONE HCL 0.4 MG/ML
0.4 VIAL (ML) INJECTION
Status: DISCONTINUED | OUTPATIENT
Start: 2018-06-22 | End: 2018-06-29

## 2018-06-22 RX ORDER — ZOLPIDEM TARTRATE 5 MG/1
10 TABLET ORAL NIGHTLY PRN
Status: DISCONTINUED | OUTPATIENT
Start: 2018-06-22 | End: 2018-06-24

## 2018-06-22 RX ORDER — LIDOCAINE HYDROCHLORIDE 10 MG/ML
INJECTION, SOLUTION INFILTRATION; PERINEURAL AS NEEDED
Status: DISCONTINUED | OUTPATIENT
Start: 2018-06-22 | End: 2018-06-22 | Stop reason: SURG

## 2018-06-22 RX ORDER — TAMSULOSIN HYDROCHLORIDE 0.4 MG/1
0.4 CAPSULE ORAL 2 TIMES DAILY
Status: DISCONTINUED | OUTPATIENT
Start: 2018-06-22 | End: 2018-07-05 | Stop reason: HOSPADM

## 2018-06-22 RX ORDER — FENTANYL CITRATE 50 UG/ML
INJECTION, SOLUTION INTRAMUSCULAR; INTRAVENOUS AS NEEDED
Status: DISCONTINUED | OUTPATIENT
Start: 2018-06-22 | End: 2018-06-22 | Stop reason: SURG

## 2018-06-22 RX ORDER — IPRATROPIUM BROMIDE 42 UG/1
2 SPRAY, METERED NASAL EVERY 8 HOURS SCHEDULED
Status: DISCONTINUED | OUTPATIENT
Start: 2018-06-22 | End: 2018-07-05 | Stop reason: HOSPADM

## 2018-06-22 RX ORDER — CARVEDILOL 3.12 MG/1
3.12 TABLET ORAL 2 TIMES DAILY WITH MEALS
Status: DISCONTINUED | OUTPATIENT
Start: 2018-06-22 | End: 2018-07-03

## 2018-06-22 RX ORDER — ATRACURIUM BESYLATE 10 MG/ML
INJECTION, SOLUTION INTRAVENOUS AS NEEDED
Status: DISCONTINUED | OUTPATIENT
Start: 2018-06-22 | End: 2018-06-22 | Stop reason: SURG

## 2018-06-22 RX ADMIN — LIDOCAINE HYDROCHLORIDE 50 MG: 10 INJECTION, SOLUTION INFILTRATION; PERINEURAL at 12:57

## 2018-06-22 RX ADMIN — GLYCOPYRROLATE 0.4 MG: 0.2 INJECTION INTRAMUSCULAR; INTRAVENOUS at 16:26

## 2018-06-22 RX ADMIN — LIDOCAINE HYDROCHLORIDE 0.5 ML: 10 INJECTION, SOLUTION EPIDURAL; INFILTRATION; INTRACAUDAL; PERINEURAL at 10:40

## 2018-06-22 RX ADMIN — POTASSIUM CHLORIDE AND SODIUM CHLORIDE 100 ML/HR: 900; 150 INJECTION, SOLUTION INTRAVENOUS at 21:15

## 2018-06-22 RX ADMIN — ATRACURIUM BESYLATE 15 MG: 10 INJECTION, SOLUTION INTRAVENOUS at 13:32

## 2018-06-22 RX ADMIN — ATRACURIUM BESYLATE 35 MG: 10 INJECTION, SOLUTION INTRAVENOUS at 12:57

## 2018-06-22 RX ADMIN — FENTANYL CITRATE 50 MCG: 50 INJECTION, SOLUTION INTRAMUSCULAR; INTRAVENOUS at 16:30

## 2018-06-22 RX ADMIN — PHENYLEPHRINE HYDROCHLORIDE 0.1 MCG/KG/MIN: 10 INJECTION INTRAVENOUS at 13:36

## 2018-06-22 RX ADMIN — CEFAZOLIN SODIUM 2 G: 2 INJECTION, SOLUTION INTRAVENOUS at 16:22

## 2018-06-22 RX ADMIN — OXYCODONE HYDROCHLORIDE AND ACETAMINOPHEN 1 TABLET: 7.5; 325 TABLET ORAL at 19:51

## 2018-06-22 RX ADMIN — CEFAZOLIN SODIUM 2 G: 2 INJECTION, SOLUTION INTRAVENOUS at 23:36

## 2018-06-22 RX ADMIN — HYDROCODONE BITARTRATE AND ACETAMINOPHEN 1 TABLET: 7.5; 325 TABLET ORAL at 10:50

## 2018-06-22 RX ADMIN — FENTANYL CITRATE 50 MCG: 50 INJECTION, SOLUTION INTRAMUSCULAR; INTRAVENOUS at 16:14

## 2018-06-22 RX ADMIN — Medication 3 MG: at 16:26

## 2018-06-22 RX ADMIN — PREGABALIN 150 MG: 150 CAPSULE ORAL at 10:50

## 2018-06-22 RX ADMIN — PHENYLEPHRINE HYDROCHLORIDE 80 MCG: 10 INJECTION INTRAVENOUS at 16:24

## 2018-06-22 RX ADMIN — PHENYLEPHRINE HYDROCHLORIDE 100 MCG: 10 INJECTION INTRAVENOUS at 13:34

## 2018-06-22 RX ADMIN — GLIPIZIDE: 5 TABLET ORAL at 21:11

## 2018-06-22 RX ADMIN — ONDANSETRON 4 MG: 2 INJECTION INTRAMUSCULAR; INTRAVENOUS at 16:14

## 2018-06-22 RX ADMIN — PHENYLEPHRINE HYDROCHLORIDE 100 MCG: 10 INJECTION INTRAVENOUS at 13:44

## 2018-06-22 RX ADMIN — TAMSULOSIN HYDROCHLORIDE 0.4 MG: 0.4 CAPSULE ORAL at 21:11

## 2018-06-22 RX ADMIN — FAMOTIDINE 20 MG: 20 TABLET ORAL at 10:50

## 2018-06-22 RX ADMIN — GABAPENTIN 1500 MG: 300 CAPSULE ORAL at 21:13

## 2018-06-22 RX ADMIN — HYDROMORPHONE HYDROCHLORIDE 0.5 MG: 1 INJECTION, SOLUTION INTRAMUSCULAR; INTRAVENOUS; SUBCUTANEOUS at 18:28

## 2018-06-22 RX ADMIN — FENTANYL CITRATE 50 MCG: 50 INJECTION, SOLUTION INTRAMUSCULAR; INTRAVENOUS at 17:44

## 2018-06-22 RX ADMIN — PHENYLEPHRINE HYDROCHLORIDE 100 MCG: 10 INJECTION INTRAVENOUS at 13:05

## 2018-06-22 RX ADMIN — ACETAMINOPHEN 650 MG: 325 TABLET, FILM COATED ORAL at 10:50

## 2018-06-22 RX ADMIN — FAMOTIDINE 20 MG: 20 TABLET ORAL at 21:12

## 2018-06-22 RX ADMIN — SODIUM CHLORIDE, POTASSIUM CHLORIDE, SODIUM LACTATE AND CALCIUM CHLORIDE: 600; 310; 30; 20 INJECTION, SOLUTION INTRAVENOUS at 14:06

## 2018-06-22 RX ADMIN — CARVEDILOL 3.12 MG: 3.12 TABLET, FILM COATED ORAL at 21:11

## 2018-06-22 RX ADMIN — OXYBUTYNIN CHLORIDE 5 MG: 5 TABLET ORAL at 21:13

## 2018-06-22 RX ADMIN — IBUPROFEN 800 MG: 800 TABLET ORAL at 10:50

## 2018-06-22 RX ADMIN — CEFAZOLIN SODIUM 2 G: 2 INJECTION, SOLUTION INTRAVENOUS at 13:12

## 2018-06-22 RX ADMIN — FENTANYL CITRATE 50 MCG: 50 INJECTION, SOLUTION INTRAMUSCULAR; INTRAVENOUS at 17:35

## 2018-06-22 RX ADMIN — PHENYLEPHRINE HYDROCHLORIDE 100 MCG: 10 INJECTION INTRAVENOUS at 13:26

## 2018-06-22 RX ADMIN — HYDROMORPHONE HYDROCHLORIDE 0.5 MG: 1 INJECTION, SOLUTION INTRAMUSCULAR; INTRAVENOUS; SUBCUTANEOUS at 21:14

## 2018-06-22 RX ADMIN — SERTRALINE HYDROCHLORIDE 150 MG: 100 TABLET ORAL at 21:11

## 2018-06-22 RX ADMIN — NAPROXEN 500 MG: 500 TABLET ORAL at 21:13

## 2018-06-22 RX ADMIN — ZOLPIDEM TARTRATE 10 MG: 5 TABLET, FILM COATED ORAL at 21:10

## 2018-06-22 RX ADMIN — ATORVASTATIN CALCIUM 10 MG: 10 TABLET, FILM COATED ORAL at 21:12

## 2018-06-22 RX ADMIN — PANTOPRAZOLE SODIUM 40 MG: 40 TABLET, DELAYED RELEASE ORAL at 21:10

## 2018-06-22 RX ADMIN — DEXAMETHASONE SODIUM PHOSPHATE 4 MG: 4 INJECTION, SOLUTION INTRAMUSCULAR; INTRAVENOUS at 13:49

## 2018-06-22 RX ADMIN — PROPOFOL 180 MG: 10 INJECTION, EMULSION INTRAVENOUS at 12:57

## 2018-06-22 RX ADMIN — SODIUM CHLORIDE, POTASSIUM CHLORIDE, SODIUM LACTATE AND CALCIUM CHLORIDE 9 ML/HR: 600; 310; 30; 20 INJECTION, SOLUTION INTRAVENOUS at 10:40

## 2018-06-22 RX ADMIN — PHENYLEPHRINE HYDROCHLORIDE 100 MCG: 10 INJECTION INTRAVENOUS at 13:21

## 2018-06-22 NOTE — ANESTHESIA PROCEDURE NOTES
Airway  Urgency: elective    Airway not difficult    General Information and Staff    Patient location during procedure: OR    Indications and Patient Condition  Indications for airway management: airway protection    Preoxygenated: yes  MILS not maintained throughout  Mask difficulty assessment: 1 - vent by mask    Final Airway Details  Final airway type: endotracheal airway      Successful airway: ETT  Cuffed: yes   Successful intubation technique: direct laryngoscopy  Endotracheal tube insertion site: oral  Blade: Adams  Blade size: #3  ETT size: 7.0 mm  Cormack-Lehane Classification: grade I - full view of glottis  Placement verified by: chest auscultation and capnometry   Measured from: lips  ETT to lips (cm): 20  Number of attempts at approach: 1    Additional Comments  Negative epigastric sounds, Breath sound equal bilaterally with symmetric chest rise and fall

## 2018-06-22 NOTE — ANESTHESIA PREPROCEDURE EVALUATION
Anesthesia Evaluation                  Airway   Mallampati: I  TM distance: >3 FB  Neck ROM: full  No difficulty expected  Dental      Pulmonary    Cardiovascular     ECG reviewed    (+) hypertension, valvular problems/murmurs, CABG, CHF,     ROS comment: Mild as, va 1.2 gradient 17    Neuro/Psych  (+) numbness,     GI/Hepatic/Renal/Endo    (+) obesity,   diabetes mellitus,     Musculoskeletal     Abdominal    Substance History      OB/GYN          Other                        Anesthesia Plan    ASA 4     general     intravenous induction   Anesthetic plan and risks discussed with patient.    Plan discussed with CRNA.

## 2018-06-22 NOTE — ANESTHESIA POSTPROCEDURE EVALUATION
Patient: Sai Main    Procedure Summary     Date:  06/22/18 Room / Location:   [facility]    Anesthesia Start:  1247 Anesthesia Stop:      Procedure:  LUMBAR LAMINECTOMY L2-5 (N/A Spine Lumbar) Diagnosis:       Spondylosis with myelopathy, lumbar region      Spinal stenosis, lumbar region, with neurogenic claudication      Degenerative disc disease, lumbar      Osteoarthritis of spine with radiculopathy, lumbar region      (Spondylosis with myelopathy, lumbar region [M47.16])      (Spinal stenosis, lumbar region, with neurogenic claudication [M48.062])      (Degenerative disc disease, lumbar [M51.36])      (Osteoarthritis of spine with radiculopathy, lumbar region [M47.26])    Surgeon:  oJse Cantu MD Provider:  Kris Mccauley MD    Anesthesia Type:  general ASA Status:  4          Anesthesia Type: general  Last vitals  /87   Temp 97.7   Pulse 74   Resp 18   SpO2 96     Post Anesthesia Care and Evaluation    Patient location during evaluation: PACU  Patient participation: complete - patient participated  Level of consciousness: awake and alert  Pain score: 0  Pain management: adequate  Airway patency: patent  Anesthetic complications: No anesthetic complications  PONV Status: none  Cardiovascular status: hemodynamically stable and acceptable  Respiratory status: nonlabored ventilation, acceptable and nasal cannula  Hydration status: acceptable

## 2018-06-23 LAB
GLUCOSE BLDC GLUCOMTR-MCNC: 138 MG/DL (ref 70–130)
GLUCOSE BLDC GLUCOMTR-MCNC: 148 MG/DL (ref 70–130)
GLUCOSE BLDC GLUCOMTR-MCNC: 275 MG/DL (ref 70–130)
GLUCOSE BLDC GLUCOMTR-MCNC: 91 MG/DL (ref 70–130)
HCT VFR BLD AUTO: 28.7 % (ref 38.9–50.9)
HGB BLD-MCNC: 9.2 G/DL (ref 13.1–17.5)

## 2018-06-23 PROCEDURE — 82962 GLUCOSE BLOOD TEST: CPT

## 2018-06-23 PROCEDURE — 63710000001 INSULIN LISPRO PROTAMINE-INSULIN LISPRO (75-25) 100 UNIT/ML SUSPENSION 10 ML VIAL: Performed by: NEUROLOGICAL SURGERY

## 2018-06-23 PROCEDURE — G8988 SELF CARE GOAL STATUS: HCPCS | Performed by: OCCUPATIONAL THERAPIST

## 2018-06-23 PROCEDURE — 25010000003 CEFAZOLIN IN DEXTROSE 2-4 GM/100ML-% SOLUTION: Performed by: NEUROLOGICAL SURGERY

## 2018-06-23 PROCEDURE — G8978 MOBILITY CURRENT STATUS: HCPCS

## 2018-06-23 PROCEDURE — 97162 PT EVAL MOD COMPLEX 30 MIN: CPT

## 2018-06-23 PROCEDURE — G8979 MOBILITY GOAL STATUS: HCPCS

## 2018-06-23 PROCEDURE — 25010000002 HYDROMORPHONE PER 4 MG: Performed by: NEUROLOGICAL SURGERY

## 2018-06-23 PROCEDURE — 85014 HEMATOCRIT: CPT | Performed by: NEUROLOGICAL SURGERY

## 2018-06-23 PROCEDURE — 25810000003 SODIUM CHLORIDE 0.9 % WITH KCL 20 MEQ 20-0.9 MEQ/L-% SOLUTION: Performed by: NEUROLOGICAL SURGERY

## 2018-06-23 PROCEDURE — 97535 SELF CARE MNGMENT TRAINING: CPT | Performed by: OCCUPATIONAL THERAPIST

## 2018-06-23 PROCEDURE — G0378 HOSPITAL OBSERVATION PER HR: HCPCS

## 2018-06-23 PROCEDURE — 85018 HEMOGLOBIN: CPT | Performed by: NEUROLOGICAL SURGERY

## 2018-06-23 PROCEDURE — 63710000001 FLINTSTONES COMPLETE 60 MG CHEWABLE TABLET: Performed by: NEUROLOGICAL SURGERY

## 2018-06-23 PROCEDURE — G8987 SELF CARE CURRENT STATUS: HCPCS | Performed by: OCCUPATIONAL THERAPIST

## 2018-06-23 PROCEDURE — 97166 OT EVAL MOD COMPLEX 45 MIN: CPT | Performed by: OCCUPATIONAL THERAPIST

## 2018-06-23 RX ORDER — METHOCARBAMOL 750 MG/1
750 TABLET, FILM COATED ORAL 4 TIMES DAILY
Status: DISCONTINUED | OUTPATIENT
Start: 2018-06-23 | End: 2018-06-24 | Stop reason: SDUPTHER

## 2018-06-23 RX ADMIN — GABAPENTIN 600 MG: 300 CAPSULE ORAL at 12:08

## 2018-06-23 RX ADMIN — IPRATROPIUM BROMIDE 2 SPRAY: 42 SPRAY NASAL at 06:34

## 2018-06-23 RX ADMIN — CARVEDILOL 3.12 MG: 3.12 TABLET, FILM COATED ORAL at 08:11

## 2018-06-23 RX ADMIN — CEFAZOLIN SODIUM 2 G: 2 INJECTION, SOLUTION INTRAVENOUS at 08:02

## 2018-06-23 RX ADMIN — OXYCODONE HYDROCHLORIDE AND ACETAMINOPHEN 1 TABLET: 7.5; 325 TABLET ORAL at 08:20

## 2018-06-23 RX ADMIN — SERTRALINE HYDROCHLORIDE 150 MG: 100 TABLET ORAL at 20:42

## 2018-06-23 RX ADMIN — POTASSIUM CHLORIDE AND SODIUM CHLORIDE 100 ML/HR: 900; 150 INJECTION, SOLUTION INTRAVENOUS at 06:40

## 2018-06-23 RX ADMIN — CARVEDILOL 3.12 MG: 3.12 TABLET, FILM COATED ORAL at 16:59

## 2018-06-23 RX ADMIN — OXYCODONE HYDROCHLORIDE AND ACETAMINOPHEN 1 TABLET: 7.5; 325 TABLET ORAL at 16:00

## 2018-06-23 RX ADMIN — FAMOTIDINE 20 MG: 20 TABLET ORAL at 20:44

## 2018-06-23 RX ADMIN — FAMOTIDINE 20 MG: 20 TABLET ORAL at 12:09

## 2018-06-23 RX ADMIN — OXYCODONE HYDROCHLORIDE AND ACETAMINOPHEN 1 TABLET: 7.5; 325 TABLET ORAL at 04:07

## 2018-06-23 RX ADMIN — SERTRALINE HYDROCHLORIDE 100 MG: 100 TABLET ORAL at 12:11

## 2018-06-23 RX ADMIN — PANTOPRAZOLE SODIUM 40 MG: 40 TABLET, DELAYED RELEASE ORAL at 12:11

## 2018-06-23 RX ADMIN — METHOCARBAMOL 750 MG: 750 TABLET ORAL at 17:13

## 2018-06-23 RX ADMIN — NAPROXEN 500 MG: 500 TABLET ORAL at 08:04

## 2018-06-23 RX ADMIN — LISINOPRIL 20 MG: 20 TABLET ORAL at 12:07

## 2018-06-23 RX ADMIN — TAMSULOSIN HYDROCHLORIDE 0.4 MG: 0.4 CAPSULE ORAL at 20:43

## 2018-06-23 RX ADMIN — FINASTERIDE 5 MG: 5 TABLET, FILM COATED ORAL at 12:09

## 2018-06-23 RX ADMIN — HYDROMORPHONE HYDROCHLORIDE 0.5 MG: 1 INJECTION, SOLUTION INTRAMUSCULAR; INTRAVENOUS; SUBCUTANEOUS at 06:40

## 2018-06-23 RX ADMIN — GLIPIZIDE: 5 TABLET ORAL at 16:46

## 2018-06-23 RX ADMIN — METHOCARBAMOL 750 MG: 750 TABLET ORAL at 12:16

## 2018-06-23 RX ADMIN — INSULIN LISPRO 30 UNITS: 100 INJECTION, SUSPENSION SUBCUTANEOUS at 08:00

## 2018-06-23 RX ADMIN — ATORVASTATIN CALCIUM 10 MG: 10 TABLET, FILM COATED ORAL at 12:06

## 2018-06-23 RX ADMIN — PANTOPRAZOLE SODIUM 40 MG: 40 TABLET, DELAYED RELEASE ORAL at 20:42

## 2018-06-23 RX ADMIN — OXYBUTYNIN CHLORIDE 5 MG: 5 TABLET ORAL at 20:42

## 2018-06-23 RX ADMIN — TAMSULOSIN HYDROCHLORIDE 0.4 MG: 0.4 CAPSULE ORAL at 12:12

## 2018-06-23 RX ADMIN — METHOCARBAMOL 750 MG: 750 TABLET ORAL at 20:42

## 2018-06-23 RX ADMIN — Medication 1 TABLET: at 12:10

## 2018-06-23 RX ADMIN — FUROSEMIDE 20 MG: 40 TABLET ORAL at 12:10

## 2018-06-23 RX ADMIN — GABAPENTIN 1500 MG: 300 CAPSULE ORAL at 20:42

## 2018-06-23 RX ADMIN — NAPROXEN 500 MG: 500 TABLET ORAL at 17:13

## 2018-06-23 RX ADMIN — OXYCODONE HYDROCHLORIDE AND ACETAMINOPHEN 1 TABLET: 7.5; 325 TABLET ORAL at 12:17

## 2018-06-23 RX ADMIN — INSULIN LISPRO 30 UNITS: 100 INJECTION, SUSPENSION SUBCUTANEOUS at 16:57

## 2018-06-23 RX ADMIN — GLIPIZIDE: 5 TABLET ORAL at 07:59

## 2018-06-24 PROBLEM — Z74.09 IMPAIRED FUNCTIONAL MOBILITY, BALANCE, GAIT, AND ENDURANCE: Status: ACTIVE | Noted: 2018-06-24

## 2018-06-24 LAB
ANION GAP SERPL CALCULATED.3IONS-SCNC: 7 MMOL/L (ref 3–11)
BASOPHILS # BLD AUTO: 0.01 10*3/MM3 (ref 0–0.2)
BASOPHILS NFR BLD AUTO: 0.1 % (ref 0–1)
BUN BLD-MCNC: 13 MG/DL (ref 9–23)
BUN/CREAT SERPL: 11.8 (ref 7–25)
CALCIUM SPEC-SCNC: 8.6 MG/DL (ref 8.7–10.4)
CHLORIDE SERPL-SCNC: 105 MMOL/L (ref 99–109)
CO2 SERPL-SCNC: 27 MMOL/L (ref 20–31)
CREAT BLD-MCNC: 1.1 MG/DL (ref 0.6–1.3)
DEPRECATED RDW RBC AUTO: 51.7 FL (ref 37–54)
EOSINOPHIL # BLD AUTO: 0.08 10*3/MM3 (ref 0–0.3)
EOSINOPHIL NFR BLD AUTO: 1.2 % (ref 0–3)
ERYTHROCYTE [DISTWIDTH] IN BLOOD BY AUTOMATED COUNT: 14.1 % (ref 11.3–14.5)
GFR SERPL CREATININE-BSD FRML MDRD: 66 ML/MIN/1.73
GLUCOSE BLD-MCNC: 112 MG/DL (ref 70–100)
GLUCOSE BLDC GLUCOMTR-MCNC: 121 MG/DL (ref 70–130)
GLUCOSE BLDC GLUCOMTR-MCNC: 124 MG/DL (ref 70–130)
GLUCOSE BLDC GLUCOMTR-MCNC: 134 MG/DL (ref 70–130)
GLUCOSE BLDC GLUCOMTR-MCNC: 199 MG/DL (ref 70–130)
HCT VFR BLD AUTO: 27.3 % (ref 38.9–50.9)
HGB BLD-MCNC: 8.8 G/DL (ref 13.1–17.5)
IMM GRANULOCYTES # BLD: 0.02 10*3/MM3 (ref 0–0.03)
IMM GRANULOCYTES NFR BLD: 0.3 % (ref 0–0.6)
LYMPHOCYTES # BLD AUTO: 0.78 10*3/MM3 (ref 0.6–4.8)
LYMPHOCYTES NFR BLD AUTO: 11.6 % (ref 24–44)
MCH RBC QN AUTO: 33 PG (ref 27–31)
MCHC RBC AUTO-ENTMCNC: 32.2 G/DL (ref 32–36)
MCV RBC AUTO: 102.2 FL (ref 80–99)
MONOCYTES # BLD AUTO: 0.88 10*3/MM3 (ref 0–1)
MONOCYTES NFR BLD AUTO: 13.1 % (ref 0–12)
NEUTROPHILS # BLD AUTO: 4.97 10*3/MM3 (ref 1.5–8.3)
NEUTROPHILS NFR BLD AUTO: 73.7 % (ref 41–71)
PLATELET # BLD AUTO: 187 10*3/MM3 (ref 150–450)
PMV BLD AUTO: 8.7 FL (ref 6–12)
POTASSIUM BLD-SCNC: 4.8 MMOL/L (ref 3.5–5.5)
RBC # BLD AUTO: 2.67 10*6/MM3 (ref 4.2–5.76)
SODIUM BLD-SCNC: 139 MMOL/L (ref 132–146)
WBC NRBC COR # BLD: 6.74 10*3/MM3 (ref 3.5–10.8)

## 2018-06-24 PROCEDURE — 97116 GAIT TRAINING THERAPY: CPT

## 2018-06-24 PROCEDURE — 82962 GLUCOSE BLOOD TEST: CPT

## 2018-06-24 PROCEDURE — 63710000001 FLINTSTONES COMPLETE 60 MG CHEWABLE TABLET: Performed by: NEUROLOGICAL SURGERY

## 2018-06-24 PROCEDURE — 85025 COMPLETE CBC W/AUTO DIFF WBC: CPT | Performed by: NEUROLOGICAL SURGERY

## 2018-06-24 PROCEDURE — 80048 BASIC METABOLIC PNL TOTAL CA: CPT | Performed by: NEUROLOGICAL SURGERY

## 2018-06-24 RX ORDER — OXYCODONE HCL 20 MG/1
20 TABLET, FILM COATED, EXTENDED RELEASE ORAL EVERY 12 HOURS SCHEDULED
Status: DISCONTINUED | OUTPATIENT
Start: 2018-06-24 | End: 2018-06-26

## 2018-06-24 RX ORDER — TEMAZEPAM 15 MG/1
15 CAPSULE ORAL NIGHTLY PRN
Status: DISCONTINUED | OUTPATIENT
Start: 2018-06-24 | End: 2018-06-26

## 2018-06-24 RX ORDER — METHOCARBAMOL 750 MG/1
750 TABLET, FILM COATED ORAL 4 TIMES DAILY
Status: DISCONTINUED | OUTPATIENT
Start: 2018-06-24 | End: 2018-06-29

## 2018-06-24 RX ADMIN — GLIPIZIDE: 5 TABLET ORAL at 16:30

## 2018-06-24 RX ADMIN — SERTRALINE HYDROCHLORIDE 150 MG: 100 TABLET ORAL at 20:41

## 2018-06-24 RX ADMIN — OXYCODONE HYDROCHLORIDE 20 MG: 20 TABLET, FILM COATED, EXTENDED RELEASE ORAL at 11:46

## 2018-06-24 RX ADMIN — OXYCODONE HYDROCHLORIDE AND ACETAMINOPHEN 1 TABLET: 7.5; 325 TABLET ORAL at 16:30

## 2018-06-24 RX ADMIN — CARVEDILOL 3.12 MG: 3.12 TABLET, FILM COATED ORAL at 08:27

## 2018-06-24 RX ADMIN — LISINOPRIL 20 MG: 20 TABLET ORAL at 08:28

## 2018-06-24 RX ADMIN — FINASTERIDE 5 MG: 5 TABLET, FILM COATED ORAL at 08:28

## 2018-06-24 RX ADMIN — METHOCARBAMOL 750 MG: 750 TABLET ORAL at 08:27

## 2018-06-24 RX ADMIN — SERTRALINE HYDROCHLORIDE 100 MG: 100 TABLET ORAL at 08:27

## 2018-06-24 RX ADMIN — NAPROXEN 500 MG: 500 TABLET ORAL at 18:12

## 2018-06-24 RX ADMIN — FUROSEMIDE 20 MG: 40 TABLET ORAL at 08:27

## 2018-06-24 RX ADMIN — PANTOPRAZOLE SODIUM 40 MG: 40 TABLET, DELAYED RELEASE ORAL at 08:27

## 2018-06-24 RX ADMIN — FAMOTIDINE 20 MG: 20 TABLET ORAL at 20:41

## 2018-06-24 RX ADMIN — GABAPENTIN 600 MG: 300 CAPSULE ORAL at 08:28

## 2018-06-24 RX ADMIN — OXYCODONE HYDROCHLORIDE 20 MG: 20 TABLET, FILM COATED, EXTENDED RELEASE ORAL at 20:42

## 2018-06-24 RX ADMIN — FAMOTIDINE 20 MG: 20 TABLET ORAL at 08:27

## 2018-06-24 RX ADMIN — NAPROXEN 500 MG: 500 TABLET ORAL at 08:26

## 2018-06-24 RX ADMIN — OXYCODONE HYDROCHLORIDE AND ACETAMINOPHEN 1 TABLET: 7.5; 325 TABLET ORAL at 01:39

## 2018-06-24 RX ADMIN — OXYBUTYNIN CHLORIDE 5 MG: 5 TABLET ORAL at 20:42

## 2018-06-24 RX ADMIN — GABAPENTIN 1500 MG: 300 CAPSULE ORAL at 20:42

## 2018-06-24 RX ADMIN — METHOCARBAMOL 750 MG: 750 TABLET ORAL at 18:12

## 2018-06-24 RX ADMIN — CARVEDILOL 3.12 MG: 3.12 TABLET, FILM COATED ORAL at 18:12

## 2018-06-24 RX ADMIN — OXYCODONE HYDROCHLORIDE AND ACETAMINOPHEN 1 TABLET: 7.5; 325 TABLET ORAL at 08:26

## 2018-06-24 RX ADMIN — METHOCARBAMOL 750 MG: 750 TABLET ORAL at 20:41

## 2018-06-24 RX ADMIN — GLIPIZIDE: 5 TABLET ORAL at 08:27

## 2018-06-24 RX ADMIN — ATORVASTATIN CALCIUM 10 MG: 10 TABLET, FILM COATED ORAL at 08:27

## 2018-06-24 RX ADMIN — METHOCARBAMOL 750 MG: 750 TABLET ORAL at 11:47

## 2018-06-24 RX ADMIN — PANTOPRAZOLE SODIUM 40 MG: 40 TABLET, DELAYED RELEASE ORAL at 20:45

## 2018-06-24 RX ADMIN — TAMSULOSIN HYDROCHLORIDE 0.4 MG: 0.4 CAPSULE ORAL at 08:27

## 2018-06-24 RX ADMIN — TAMSULOSIN HYDROCHLORIDE 0.4 MG: 0.4 CAPSULE ORAL at 20:41

## 2018-06-24 RX ADMIN — Medication 1 TABLET: at 08:28

## 2018-06-25 LAB
GLUCOSE BLDC GLUCOMTR-MCNC: 114 MG/DL (ref 70–130)
GLUCOSE BLDC GLUCOMTR-MCNC: 119 MG/DL (ref 70–130)
GLUCOSE BLDC GLUCOMTR-MCNC: 190 MG/DL (ref 70–130)
GLUCOSE BLDC GLUCOMTR-MCNC: 217 MG/DL (ref 70–130)
GLUCOSE BLDC GLUCOMTR-MCNC: 88 MG/DL (ref 70–130)

## 2018-06-25 PROCEDURE — 97535 SELF CARE MNGMENT TRAINING: CPT

## 2018-06-25 PROCEDURE — 99024 POSTOP FOLLOW-UP VISIT: CPT | Performed by: PHYSICIAN ASSISTANT

## 2018-06-25 PROCEDURE — 97116 GAIT TRAINING THERAPY: CPT

## 2018-06-25 PROCEDURE — 63710000001 FLINTSTONES COMPLETE 60 MG CHEWABLE TABLET: Performed by: NEUROLOGICAL SURGERY

## 2018-06-25 PROCEDURE — 82962 GLUCOSE BLOOD TEST: CPT

## 2018-06-25 PROCEDURE — 97110 THERAPEUTIC EXERCISES: CPT

## 2018-06-25 RX ORDER — BISACODYL 5 MG/1
10 TABLET, DELAYED RELEASE ORAL DAILY
Status: DISCONTINUED | OUTPATIENT
Start: 2018-06-25 | End: 2018-07-05 | Stop reason: HOSPADM

## 2018-06-25 RX ORDER — POLYETHYLENE GLYCOL 3350 17 G/17G
17 POWDER, FOR SOLUTION ORAL DAILY
Status: DISCONTINUED | OUTPATIENT
Start: 2018-06-25 | End: 2018-06-26

## 2018-06-25 RX ORDER — GABAPENTIN 400 MG/1
1200 CAPSULE ORAL NIGHTLY
Status: DISCONTINUED | OUTPATIENT
Start: 2018-06-25 | End: 2018-06-26

## 2018-06-25 RX ORDER — SENNA AND DOCUSATE SODIUM 50; 8.6 MG/1; MG/1
2 TABLET, FILM COATED ORAL 2 TIMES DAILY
Status: DISCONTINUED | OUTPATIENT
Start: 2018-06-25 | End: 2018-07-01

## 2018-06-25 RX ADMIN — NAPROXEN 500 MG: 500 TABLET ORAL at 10:47

## 2018-06-25 RX ADMIN — FUROSEMIDE 20 MG: 40 TABLET ORAL at 10:49

## 2018-06-25 RX ADMIN — SENNOSIDES AND DOCUSATE SODIUM 2 TABLET: 8.6; 5 TABLET ORAL at 13:45

## 2018-06-25 RX ADMIN — ATORVASTATIN CALCIUM 10 MG: 10 TABLET, FILM COATED ORAL at 10:48

## 2018-06-25 RX ADMIN — TAMSULOSIN HYDROCHLORIDE 0.4 MG: 0.4 CAPSULE ORAL at 12:07

## 2018-06-25 RX ADMIN — METHOCARBAMOL 750 MG: 750 TABLET ORAL at 21:27

## 2018-06-25 RX ADMIN — CARVEDILOL 3.12 MG: 3.12 TABLET, FILM COATED ORAL at 10:48

## 2018-06-25 RX ADMIN — GLIPIZIDE: 5 TABLET ORAL at 10:46

## 2018-06-25 RX ADMIN — FINASTERIDE 5 MG: 5 TABLET, FILM COATED ORAL at 10:46

## 2018-06-25 RX ADMIN — NAPROXEN 500 MG: 500 TABLET ORAL at 21:26

## 2018-06-25 RX ADMIN — SERTRALINE HYDROCHLORIDE 150 MG: 100 TABLET ORAL at 21:27

## 2018-06-25 RX ADMIN — GLIPIZIDE: 5 TABLET ORAL at 18:08

## 2018-06-25 RX ADMIN — OXYCODONE HYDROCHLORIDE 20 MG: 20 TABLET, FILM COATED, EXTENDED RELEASE ORAL at 21:25

## 2018-06-25 RX ADMIN — GABAPENTIN 600 MG: 300 CAPSULE ORAL at 10:48

## 2018-06-25 RX ADMIN — GUAIFENESIN 400 MG: 200 TABLET ORAL at 10:47

## 2018-06-25 RX ADMIN — ONDANSETRON 4 MG: 4 TABLET, FILM COATED ORAL at 04:37

## 2018-06-25 RX ADMIN — FAMOTIDINE 20 MG: 20 TABLET ORAL at 21:27

## 2018-06-25 RX ADMIN — OXYBUTYNIN CHLORIDE 5 MG: 5 TABLET ORAL at 21:27

## 2018-06-25 RX ADMIN — INSULIN LISPRO 30 UNITS: 100 INJECTION, SUSPENSION SUBCUTANEOUS at 10:49

## 2018-06-25 RX ADMIN — TAMSULOSIN HYDROCHLORIDE 0.4 MG: 0.4 CAPSULE ORAL at 21:26

## 2018-06-25 RX ADMIN — SERTRALINE HYDROCHLORIDE 100 MG: 100 TABLET ORAL at 10:48

## 2018-06-25 RX ADMIN — PANTOPRAZOLE SODIUM 40 MG: 40 TABLET, DELAYED RELEASE ORAL at 12:07

## 2018-06-25 RX ADMIN — OXYCODONE HYDROCHLORIDE 20 MG: 20 TABLET, FILM COATED, EXTENDED RELEASE ORAL at 10:49

## 2018-06-25 RX ADMIN — FAMOTIDINE 20 MG: 20 TABLET ORAL at 10:49

## 2018-06-25 RX ADMIN — OXYCODONE HYDROCHLORIDE AND ACETAMINOPHEN 1 TABLET: 7.5; 325 TABLET ORAL at 00:03

## 2018-06-25 RX ADMIN — METHOCARBAMOL 750 MG: 750 TABLET ORAL at 10:49

## 2018-06-25 RX ADMIN — GABAPENTIN 1200 MG: 400 CAPSULE ORAL at 21:27

## 2018-06-25 RX ADMIN — IPRATROPIUM BROMIDE 2 SPRAY: 42 SPRAY NASAL at 21:32

## 2018-06-25 RX ADMIN — PANTOPRAZOLE SODIUM 40 MG: 40 TABLET, DELAYED RELEASE ORAL at 21:25

## 2018-06-25 RX ADMIN — BISACODYL 10 MG: 5 TABLET, COATED ORAL at 13:45

## 2018-06-25 RX ADMIN — POLYETHYLENE GLYCOL (3350) 17 G: 17 POWDER, FOR SOLUTION ORAL at 18:08

## 2018-06-25 RX ADMIN — OXYCODONE HYDROCHLORIDE AND ACETAMINOPHEN 1 TABLET: 7.5; 325 TABLET ORAL at 04:37

## 2018-06-25 RX ADMIN — METHOCARBAMOL 750 MG: 750 TABLET ORAL at 18:08

## 2018-06-25 RX ADMIN — OXYCODONE HYDROCHLORIDE AND ACETAMINOPHEN 1 TABLET: 7.5; 325 TABLET ORAL at 18:08

## 2018-06-25 RX ADMIN — Medication 1 TABLET: at 10:47

## 2018-06-25 RX ADMIN — SENNOSIDES AND DOCUSATE SODIUM 2 TABLET: 8.6; 5 TABLET ORAL at 21:25

## 2018-06-25 RX ADMIN — LISINOPRIL 20 MG: 20 TABLET ORAL at 10:48

## 2018-06-25 RX ADMIN — OXYCODONE HYDROCHLORIDE AND ACETAMINOPHEN 1 TABLET: 7.5; 325 TABLET ORAL at 13:51

## 2018-06-25 RX ADMIN — CARVEDILOL 3.12 MG: 3.12 TABLET, FILM COATED ORAL at 18:08

## 2018-06-26 LAB
ABO GROUP BLD: NORMAL
ALBUMIN SERPL-MCNC: 4.16 G/DL (ref 3.2–4.8)
ALBUMIN/GLOB SERPL: 1.2 G/DL (ref 1.5–2.5)
ALP SERPL-CCNC: 95 U/L (ref 25–100)
ALT SERPL W P-5'-P-CCNC: 14 U/L (ref 7–40)
ANION GAP SERPL CALCULATED.3IONS-SCNC: 10 MMOL/L (ref 3–11)
AST SERPL-CCNC: 29 U/L (ref 0–33)
BILIRUB SERPL-MCNC: 0.3 MG/DL (ref 0.3–1.2)
BLD GP AB SCN SERPL QL: NEGATIVE
BUN BLD-MCNC: 31 MG/DL (ref 9–23)
BUN/CREAT SERPL: 16.1 (ref 7–25)
CALCIUM SPEC-SCNC: 9.7 MG/DL (ref 8.7–10.4)
CHLORIDE SERPL-SCNC: 100 MMOL/L (ref 99–109)
CO2 SERPL-SCNC: 24 MMOL/L (ref 20–31)
CREAT BLD-MCNC: 1.92 MG/DL (ref 0.6–1.3)
DEPRECATED RDW RBC AUTO: 50.7 FL (ref 37–54)
ERYTHROCYTE [DISTWIDTH] IN BLOOD BY AUTOMATED COUNT: 13.6 % (ref 11.3–14.5)
GFR SERPL CREATININE-BSD FRML MDRD: 35 ML/MIN/1.73
GLOBULIN UR ELPH-MCNC: 3.4 GM/DL
GLUCOSE BLD-MCNC: 148 MG/DL (ref 70–100)
GLUCOSE BLDC GLUCOMTR-MCNC: 125 MG/DL (ref 70–130)
GLUCOSE BLDC GLUCOMTR-MCNC: 148 MG/DL (ref 70–130)
GLUCOSE BLDC GLUCOMTR-MCNC: 199 MG/DL (ref 70–130)
HCT VFR BLD AUTO: 26.2 % (ref 38.9–50.9)
HGB BLD-MCNC: 8.4 G/DL (ref 13.1–17.5)
MCH RBC QN AUTO: 32.8 PG (ref 27–31)
MCHC RBC AUTO-ENTMCNC: 32.1 G/DL (ref 32–36)
MCV RBC AUTO: 102.3 FL (ref 80–99)
PLATELET # BLD AUTO: 222 10*3/MM3 (ref 150–450)
PMV BLD AUTO: 9.2 FL (ref 6–12)
POTASSIUM BLD-SCNC: 5.1 MMOL/L (ref 3.5–5.5)
PROT SERPL-MCNC: 7.6 G/DL (ref 5.7–8.2)
RBC # BLD AUTO: 2.56 10*6/MM3 (ref 4.2–5.76)
RH BLD: POSITIVE
SODIUM BLD-SCNC: 134 MMOL/L (ref 132–146)
T&S EXPIRATION DATE: NORMAL
WBC NRBC COR # BLD: 10.19 10*3/MM3 (ref 3.5–10.8)

## 2018-06-26 PROCEDURE — 86920 COMPATIBILITY TEST SPIN: CPT

## 2018-06-26 PROCEDURE — 94799 UNLISTED PULMONARY SVC/PX: CPT

## 2018-06-26 PROCEDURE — 86900 BLOOD TYPING SEROLOGIC ABO: CPT | Performed by: PHYSICIAN ASSISTANT

## 2018-06-26 PROCEDURE — 63710000001 FLINTSTONES COMPLETE 60 MG CHEWABLE TABLET: Performed by: NEUROLOGICAL SURGERY

## 2018-06-26 PROCEDURE — 80053 COMPREHEN METABOLIC PANEL: CPT | Performed by: PHYSICIAN ASSISTANT

## 2018-06-26 PROCEDURE — 86901 BLOOD TYPING SEROLOGIC RH(D): CPT

## 2018-06-26 PROCEDURE — 85027 COMPLETE CBC AUTOMATED: CPT | Performed by: PHYSICIAN ASSISTANT

## 2018-06-26 PROCEDURE — 97530 THERAPEUTIC ACTIVITIES: CPT | Performed by: OCCUPATIONAL THERAPIST

## 2018-06-26 PROCEDURE — 86900 BLOOD TYPING SEROLOGIC ABO: CPT

## 2018-06-26 PROCEDURE — 86850 RBC ANTIBODY SCREEN: CPT | Performed by: PHYSICIAN ASSISTANT

## 2018-06-26 PROCEDURE — 97530 THERAPEUTIC ACTIVITIES: CPT

## 2018-06-26 PROCEDURE — 86901 BLOOD TYPING SEROLOGIC RH(D): CPT | Performed by: PHYSICIAN ASSISTANT

## 2018-06-26 PROCEDURE — 82962 GLUCOSE BLOOD TEST: CPT

## 2018-06-26 RX ORDER — IBUPROFEN 800 MG/1
800 TABLET ORAL EVERY 8 HOURS SCHEDULED
Status: DISCONTINUED | OUTPATIENT
Start: 2018-06-26 | End: 2018-06-27

## 2018-06-26 RX ORDER — FUROSEMIDE 10 MG/ML
40 INJECTION INTRAMUSCULAR; INTRAVENOUS ONCE
Status: DISCONTINUED | OUTPATIENT
Start: 2018-06-26 | End: 2018-06-27

## 2018-06-26 RX ORDER — OXYCODONE HYDROCHLORIDE 15 MG/1
30 TABLET, FILM COATED, EXTENDED RELEASE ORAL EVERY 12 HOURS SCHEDULED
Status: DISCONTINUED | OUTPATIENT
Start: 2018-06-26 | End: 2018-06-26

## 2018-06-26 RX ORDER — OXYCODONE HCL 20 MG/1
20 TABLET, FILM COATED, EXTENDED RELEASE ORAL EVERY 12 HOURS SCHEDULED
Status: DISCONTINUED | OUTPATIENT
Start: 2018-06-26 | End: 2018-06-27

## 2018-06-26 RX ORDER — GABAPENTIN 300 MG/1
600 CAPSULE ORAL NIGHTLY
Status: DISCONTINUED | OUTPATIENT
Start: 2018-06-26 | End: 2018-06-29

## 2018-06-26 RX ORDER — GABAPENTIN 300 MG/1
300 CAPSULE ORAL DAILY
Status: DISCONTINUED | OUTPATIENT
Start: 2018-06-27 | End: 2018-07-05 | Stop reason: HOSPADM

## 2018-06-26 RX ORDER — IMIPRAMINE HCL 25 MG
75 TABLET ORAL NIGHTLY
Status: DISCONTINUED | OUTPATIENT
Start: 2018-06-26 | End: 2018-06-29

## 2018-06-26 RX ORDER — SODIUM CHLORIDE 9 MG/ML
80 INJECTION, SOLUTION INTRAVENOUS CONTINUOUS
Status: DISCONTINUED | OUTPATIENT
Start: 2018-06-26 | End: 2018-06-27

## 2018-06-26 RX ORDER — NALOXONE HYDROCHLORIDE 0.4 MG/ML
0.2 INJECTION, SOLUTION INTRAMUSCULAR; INTRAVENOUS; SUBCUTANEOUS
Status: DISCONTINUED | OUTPATIENT
Start: 2018-06-26 | End: 2018-06-29

## 2018-06-26 RX ORDER — POLYETHYLENE GLYCOL 3350 17 G/17G
17 POWDER, FOR SOLUTION ORAL DAILY
Status: DISCONTINUED | OUTPATIENT
Start: 2018-06-26 | End: 2018-06-26 | Stop reason: SDUPTHER

## 2018-06-26 RX ADMIN — FAMOTIDINE 20 MG: 20 TABLET ORAL at 21:53

## 2018-06-26 RX ADMIN — SERTRALINE HYDROCHLORIDE 100 MG: 100 TABLET ORAL at 08:51

## 2018-06-26 RX ADMIN — INSULIN LISPRO 30 UNITS: 100 INJECTION, SUSPENSION SUBCUTANEOUS at 18:33

## 2018-06-26 RX ADMIN — GABAPENTIN 600 MG: 300 CAPSULE ORAL at 21:54

## 2018-06-26 RX ADMIN — IMIPRAMINE HYDROCHLORIDE 75 MG: 25 TABLET, FILM COATED ORAL at 21:53

## 2018-06-26 RX ADMIN — OXYCODONE HYDROCHLORIDE 30 MG: 15 TABLET, FILM COATED, EXTENDED RELEASE ORAL at 08:51

## 2018-06-26 RX ADMIN — Medication 1 TABLET: at 09:00

## 2018-06-26 RX ADMIN — LISINOPRIL 20 MG: 20 TABLET ORAL at 08:51

## 2018-06-26 RX ADMIN — IBUPROFEN 800 MG: 800 TABLET ORAL at 21:55

## 2018-06-26 RX ADMIN — BISACODYL 10 MG: 5 TABLET, COATED ORAL at 08:51

## 2018-06-26 RX ADMIN — TAMSULOSIN HYDROCHLORIDE 0.4 MG: 0.4 CAPSULE ORAL at 08:52

## 2018-06-26 RX ADMIN — TAMSULOSIN HYDROCHLORIDE 0.4 MG: 0.4 CAPSULE ORAL at 21:55

## 2018-06-26 RX ADMIN — FINASTERIDE 5 MG: 5 TABLET, FILM COATED ORAL at 08:52

## 2018-06-26 RX ADMIN — GLIPIZIDE: 5 TABLET ORAL at 18:35

## 2018-06-26 RX ADMIN — SERTRALINE HYDROCHLORIDE 150 MG: 100 TABLET ORAL at 21:53

## 2018-06-26 RX ADMIN — OXYBUTYNIN CHLORIDE 5 MG: 5 TABLET ORAL at 21:54

## 2018-06-26 RX ADMIN — IBUPROFEN 800 MG: 800 TABLET ORAL at 08:51

## 2018-06-26 RX ADMIN — PANTOPRAZOLE SODIUM 40 MG: 40 TABLET, DELAYED RELEASE ORAL at 09:00

## 2018-06-26 RX ADMIN — ATORVASTATIN CALCIUM 10 MG: 10 TABLET, FILM COATED ORAL at 08:52

## 2018-06-26 RX ADMIN — FAMOTIDINE 20 MG: 20 TABLET ORAL at 08:52

## 2018-06-26 RX ADMIN — SENNOSIDES AND DOCUSATE SODIUM 2 TABLET: 8.6; 5 TABLET ORAL at 21:53

## 2018-06-26 RX ADMIN — FUROSEMIDE 20 MG: 40 TABLET ORAL at 08:51

## 2018-06-26 RX ADMIN — PANTOPRAZOLE SODIUM 40 MG: 40 TABLET, DELAYED RELEASE ORAL at 22:06

## 2018-06-26 RX ADMIN — SENNOSIDES AND DOCUSATE SODIUM 2 TABLET: 8.6; 5 TABLET ORAL at 08:51

## 2018-06-26 RX ADMIN — POLYETHYLENE GLYCOL (3350) 17 G: 17 POWDER, FOR SOLUTION ORAL at 08:52

## 2018-06-26 RX ADMIN — METHOCARBAMOL 750 MG: 750 TABLET ORAL at 08:51

## 2018-06-26 RX ADMIN — CARVEDILOL 3.12 MG: 3.12 TABLET, FILM COATED ORAL at 08:51

## 2018-06-26 RX ADMIN — INSULIN LISPRO 30 UNITS: 100 INJECTION, SUSPENSION SUBCUTANEOUS at 08:52

## 2018-06-26 RX ADMIN — GABAPENTIN 600 MG: 300 CAPSULE ORAL at 08:50

## 2018-06-26 RX ADMIN — METHOCARBAMOL 750 MG: 750 TABLET ORAL at 21:54

## 2018-06-26 RX ADMIN — IPRATROPIUM BROMIDE 2 SPRAY: 42 SPRAY NASAL at 06:55

## 2018-06-26 RX ADMIN — SODIUM CHLORIDE 80 ML/HR: 9 INJECTION, SOLUTION INTRAVENOUS at 21:47

## 2018-06-26 RX ADMIN — OXYCODONE HYDROCHLORIDE AND ACETAMINOPHEN 1 TABLET: 7.5; 325 TABLET ORAL at 06:54

## 2018-06-26 RX ADMIN — IBUPROFEN 800 MG: 800 TABLET ORAL at 15:03

## 2018-06-26 RX ADMIN — OXYCODONE HYDROCHLORIDE 20 MG: 20 TABLET, FILM COATED, EXTENDED RELEASE ORAL at 22:10

## 2018-06-26 RX ADMIN — CARVEDILOL 3.12 MG: 3.12 TABLET, FILM COATED ORAL at 22:01

## 2018-06-26 RX ADMIN — GLIPIZIDE: 5 TABLET ORAL at 06:54

## 2018-06-27 PROBLEM — D64.9 ANEMIA: Status: ACTIVE | Noted: 2018-06-27

## 2018-06-27 PROBLEM — G93.41 METABOLIC ENCEPHALOPATHY: Status: ACTIVE | Noted: 2018-06-27

## 2018-06-27 PROBLEM — E16.2 HYPOGLYCEMIA: Status: ACTIVE | Noted: 2018-06-27

## 2018-06-27 PROBLEM — N17.9 AKI (ACUTE KIDNEY INJURY) (HCC): Status: ACTIVE | Noted: 2018-06-27

## 2018-06-27 PROBLEM — I95.9 HYPOTENSION: Status: ACTIVE | Noted: 2018-06-27

## 2018-06-27 LAB
ABO GROUP BLD: NORMAL
ANION GAP SERPL CALCULATED.3IONS-SCNC: 6 MMOL/L (ref 3–11)
BACTERIA UR QL AUTO: ABNORMAL /HPF
BASOPHILS # BLD AUTO: 0.02 10*3/MM3 (ref 0–0.2)
BASOPHILS NFR BLD AUTO: 0.3 % (ref 0–1)
BILIRUB UR QL STRIP: NEGATIVE
BUN BLD-MCNC: 43 MG/DL (ref 9–23)
BUN/CREAT SERPL: 21.3 (ref 7–25)
CALCIUM SPEC-SCNC: 8.6 MG/DL (ref 8.7–10.4)
CHLORIDE SERPL-SCNC: 104 MMOL/L (ref 99–109)
CLARITY UR: ABNORMAL
CO2 SERPL-SCNC: 26 MMOL/L (ref 20–31)
COLOR UR: YELLOW
CREAT BLD-MCNC: 2.02 MG/DL (ref 0.6–1.3)
CREAT UR-MCNC: 57.8 MG/DL
DEPRECATED RDW RBC AUTO: 58.4 FL (ref 37–54)
EOSINOPHIL # BLD AUTO: 0.22 10*3/MM3 (ref 0–0.3)
EOSINOPHIL NFR BLD AUTO: 2.9 % (ref 0–3)
EOSINOPHIL SPEC QL MICRO: 0 % EOS/100 CELLS (ref 0–0)
ERYTHROCYTE [DISTWIDTH] IN BLOOD BY AUTOMATED COUNT: 16.3 % (ref 11.3–14.5)
GFR SERPL CREATININE-BSD FRML MDRD: 33 ML/MIN/1.73
GLUCOSE BLD-MCNC: 81 MG/DL (ref 70–100)
GLUCOSE BLDC GLUCOMTR-MCNC: 203 MG/DL (ref 70–130)
GLUCOSE BLDC GLUCOMTR-MCNC: 41 MG/DL (ref 70–130)
GLUCOSE BLDC GLUCOMTR-MCNC: 43 MG/DL (ref 70–130)
GLUCOSE BLDC GLUCOMTR-MCNC: 47 MG/DL (ref 70–130)
GLUCOSE BLDC GLUCOMTR-MCNC: 49 MG/DL (ref 70–130)
GLUCOSE BLDC GLUCOMTR-MCNC: 63 MG/DL (ref 70–130)
GLUCOSE BLDC GLUCOMTR-MCNC: 67 MG/DL (ref 70–130)
GLUCOSE BLDC GLUCOMTR-MCNC: 88 MG/DL (ref 70–130)
GLUCOSE BLDC GLUCOMTR-MCNC: 97 MG/DL (ref 70–130)
GLUCOSE UR STRIP-MCNC: NEGATIVE MG/DL
HCT VFR BLD AUTO: 27.9 % (ref 38.9–50.9)
HGB BLD-MCNC: 9 G/DL (ref 13.1–17.5)
HGB UR QL STRIP.AUTO: NEGATIVE
HYALINE CASTS UR QL AUTO: ABNORMAL /LPF
IMM GRANULOCYTES # BLD: 0.03 10*3/MM3 (ref 0–0.03)
IMM GRANULOCYTES NFR BLD: 0.4 % (ref 0–0.6)
KETONES UR QL STRIP: NEGATIVE
LEUKOCYTE ESTERASE UR QL STRIP.AUTO: NEGATIVE
LYMPHOCYTES # BLD AUTO: 0.71 10*3/MM3 (ref 0.6–4.8)
LYMPHOCYTES NFR BLD AUTO: 9.4 % (ref 24–44)
MCH RBC QN AUTO: 31.3 PG (ref 27–31)
MCHC RBC AUTO-ENTMCNC: 32.3 G/DL (ref 32–36)
MCV RBC AUTO: 96.9 FL (ref 80–99)
MONOCYTES # BLD AUTO: 1.22 10*3/MM3 (ref 0–1)
MONOCYTES NFR BLD AUTO: 16.2 % (ref 0–12)
NEUTROPHILS # BLD AUTO: 5.38 10*3/MM3 (ref 1.5–8.3)
NEUTROPHILS NFR BLD AUTO: 71.2 % (ref 41–71)
NITRITE UR QL STRIP: NEGATIVE
PH UR STRIP.AUTO: <=5 [PH] (ref 5–8)
PLATELET # BLD AUTO: 230 10*3/MM3 (ref 150–450)
PMV BLD AUTO: 9 FL (ref 6–12)
POTASSIUM BLD-SCNC: 4.8 MMOL/L (ref 3.5–5.5)
PROT UR QL STRIP: ABNORMAL
PROT UR-MCNC: 20 MG/DL (ref 1–14)
RBC # BLD AUTO: 2.88 10*6/MM3 (ref 4.2–5.76)
RBC # UR: ABNORMAL /HPF
REF LAB TEST METHOD: ABNORMAL
RH BLD: POSITIVE
SODIUM BLD-SCNC: 136 MMOL/L (ref 132–146)
SODIUM UR-SCNC: 60 MMOL/L (ref 30–90)
SP GR UR STRIP: 1.01 (ref 1–1.03)
SQUAMOUS #/AREA URNS HPF: ABNORMAL /HPF
UROBILINOGEN UR QL STRIP: ABNORMAL
WBC NRBC COR # BLD: 7.55 10*3/MM3 (ref 3.5–10.8)
WBC UR QL AUTO: ABNORMAL /HPF

## 2018-06-27 PROCEDURE — 82570 ASSAY OF URINE CREATININE: CPT | Performed by: NURSE PRACTITIONER

## 2018-06-27 PROCEDURE — 86900 BLOOD TYPING SEROLOGIC ABO: CPT

## 2018-06-27 PROCEDURE — 85025 COMPLETE CBC W/AUTO DIFF WBC: CPT | Performed by: NEUROLOGICAL SURGERY

## 2018-06-27 PROCEDURE — 84156 ASSAY OF PROTEIN URINE: CPT | Performed by: NURSE PRACTITIONER

## 2018-06-27 PROCEDURE — 97110 THERAPEUTIC EXERCISES: CPT

## 2018-06-27 PROCEDURE — 99024 POSTOP FOLLOW-UP VISIT: CPT | Performed by: PHYSICIAN ASSISTANT

## 2018-06-27 PROCEDURE — 36430 TRANSFUSION BLD/BLD COMPNT: CPT

## 2018-06-27 PROCEDURE — 25010000002 HYDROMORPHONE PER 4 MG: Performed by: NEUROLOGICAL SURGERY

## 2018-06-27 PROCEDURE — 87205 SMEAR GRAM STAIN: CPT | Performed by: NURSE PRACTITIONER

## 2018-06-27 PROCEDURE — 82962 GLUCOSE BLOOD TEST: CPT

## 2018-06-27 PROCEDURE — P9016 RBC LEUKOCYTES REDUCED: HCPCS

## 2018-06-27 PROCEDURE — 94799 UNLISTED PULMONARY SVC/PX: CPT

## 2018-06-27 PROCEDURE — 99221 1ST HOSP IP/OBS SF/LOW 40: CPT | Performed by: NURSE PRACTITIONER

## 2018-06-27 PROCEDURE — 63710000001 FLINTSTONES COMPLETE 60 MG CHEWABLE TABLET: Performed by: NEUROLOGICAL SURGERY

## 2018-06-27 PROCEDURE — 86335 IMMUNFIX E-PHORSIS/URINE/CSF: CPT | Performed by: NURSE PRACTITIONER

## 2018-06-27 PROCEDURE — 97530 THERAPEUTIC ACTIVITIES: CPT

## 2018-06-27 PROCEDURE — 81001 URINALYSIS AUTO W/SCOPE: CPT | Performed by: NURSE PRACTITIONER

## 2018-06-27 PROCEDURE — 80048 BASIC METABOLIC PNL TOTAL CA: CPT | Performed by: NEUROLOGICAL SURGERY

## 2018-06-27 PROCEDURE — 84300 ASSAY OF URINE SODIUM: CPT | Performed by: NURSE PRACTITIONER

## 2018-06-27 RX ORDER — NICOTINE POLACRILEX 4 MG
15 LOZENGE BUCCAL
Status: DISCONTINUED | OUTPATIENT
Start: 2018-06-27 | End: 2018-07-05 | Stop reason: HOSPADM

## 2018-06-27 RX ORDER — FUROSEMIDE 10 MG/ML
20 INJECTION INTRAMUSCULAR; INTRAVENOUS ONCE
Status: DISCONTINUED | OUTPATIENT
Start: 2018-06-27 | End: 2018-07-05 | Stop reason: HOSPADM

## 2018-06-27 RX ORDER — SODIUM CHLORIDE 9 MG/ML
100 INJECTION, SOLUTION INTRAVENOUS CONTINUOUS
Status: DISCONTINUED | OUTPATIENT
Start: 2018-06-27 | End: 2018-06-29

## 2018-06-27 RX ORDER — OXYCODONE HCL 10 MG/1
10 TABLET, FILM COATED, EXTENDED RELEASE ORAL EVERY 12 HOURS SCHEDULED
Status: DISCONTINUED | OUTPATIENT
Start: 2018-06-27 | End: 2018-06-28

## 2018-06-27 RX ORDER — DEXTROSE MONOHYDRATE 25 G/50ML
25 INJECTION, SOLUTION INTRAVENOUS
Status: DISCONTINUED | OUTPATIENT
Start: 2018-06-27 | End: 2018-07-05 | Stop reason: HOSPADM

## 2018-06-27 RX ADMIN — FINASTERIDE 5 MG: 5 TABLET, FILM COATED ORAL at 08:44

## 2018-06-27 RX ADMIN — SODIUM CHLORIDE 100 ML/HR: 9 INJECTION, SOLUTION INTRAVENOUS at 21:29

## 2018-06-27 RX ADMIN — FAMOTIDINE 20 MG: 20 TABLET ORAL at 22:05

## 2018-06-27 RX ADMIN — IBUPROFEN 800 MG: 800 TABLET ORAL at 14:03

## 2018-06-27 RX ADMIN — IBUPROFEN 800 MG: 800 TABLET ORAL at 05:43

## 2018-06-27 RX ADMIN — METHOCARBAMOL 750 MG: 750 TABLET ORAL at 14:03

## 2018-06-27 RX ADMIN — Medication 1 TABLET: at 08:43

## 2018-06-27 RX ADMIN — METHOCARBAMOL 750 MG: 750 TABLET ORAL at 22:04

## 2018-06-27 RX ADMIN — GLIPIZIDE: 5 TABLET ORAL at 08:43

## 2018-06-27 RX ADMIN — CARVEDILOL 3.12 MG: 3.12 TABLET, FILM COATED ORAL at 08:45

## 2018-06-27 RX ADMIN — LISINOPRIL 20 MG: 20 TABLET ORAL at 08:45

## 2018-06-27 RX ADMIN — OXYBUTYNIN CHLORIDE 5 MG: 5 TABLET ORAL at 22:04

## 2018-06-27 RX ADMIN — FAMOTIDINE 20 MG: 20 TABLET ORAL at 08:45

## 2018-06-27 RX ADMIN — TAMSULOSIN HYDROCHLORIDE 0.4 MG: 0.4 CAPSULE ORAL at 08:44

## 2018-06-27 RX ADMIN — OXYCODONE HYDROCHLORIDE 10 MG: 10 TABLET, FILM COATED, EXTENDED RELEASE ORAL at 22:05

## 2018-06-27 RX ADMIN — GABAPENTIN 300 MG: 300 CAPSULE ORAL at 08:44

## 2018-06-27 RX ADMIN — IMIPRAMINE HYDROCHLORIDE 75 MG: 25 TABLET, FILM COATED ORAL at 22:10

## 2018-06-27 RX ADMIN — SERTRALINE HYDROCHLORIDE 150 MG: 100 TABLET ORAL at 22:05

## 2018-06-27 RX ADMIN — CARVEDILOL 3.12 MG: 3.12 TABLET, FILM COATED ORAL at 22:06

## 2018-06-27 RX ADMIN — PANTOPRAZOLE SODIUM 40 MG: 40 TABLET, DELAYED RELEASE ORAL at 22:13

## 2018-06-27 RX ADMIN — BISACODYL 10 MG: 5 TABLET, COATED ORAL at 08:44

## 2018-06-27 RX ADMIN — HYDROMORPHONE HYDROCHLORIDE 0.5 MG: 1 INJECTION, SOLUTION INTRAMUSCULAR; INTRAVENOUS; SUBCUTANEOUS at 11:39

## 2018-06-27 RX ADMIN — PANTOPRAZOLE SODIUM 40 MG: 40 TABLET, DELAYED RELEASE ORAL at 08:44

## 2018-06-27 RX ADMIN — METHOCARBAMOL 750 MG: 750 TABLET ORAL at 08:45

## 2018-06-27 RX ADMIN — GABAPENTIN 600 MG: 300 CAPSULE ORAL at 22:06

## 2018-06-27 RX ADMIN — FUROSEMIDE 20 MG: 40 TABLET ORAL at 08:47

## 2018-06-27 RX ADMIN — TAMSULOSIN HYDROCHLORIDE 0.4 MG: 0.4 CAPSULE ORAL at 22:05

## 2018-06-27 RX ADMIN — ATORVASTATIN CALCIUM 10 MG: 10 TABLET, FILM COATED ORAL at 08:45

## 2018-06-27 RX ADMIN — SENNOSIDES AND DOCUSATE SODIUM 2 TABLET: 8.6; 5 TABLET ORAL at 08:44

## 2018-06-27 RX ADMIN — OXYCODONE HYDROCHLORIDE 10 MG: 10 TABLET, FILM COATED, EXTENDED RELEASE ORAL at 08:50

## 2018-06-27 RX ADMIN — POLYETHYLENE GLYCOL (3350) 17 G: 17 POWDER, FOR SOLUTION ORAL at 08:44

## 2018-06-27 RX ADMIN — SERTRALINE HYDROCHLORIDE 100 MG: 100 TABLET ORAL at 08:44

## 2018-06-27 RX ADMIN — SENNOSIDES AND DOCUSATE SODIUM 2 TABLET: 8.6; 5 TABLET ORAL at 22:06

## 2018-06-28 ENCOUNTER — APPOINTMENT (OUTPATIENT)
Dept: ULTRASOUND IMAGING | Facility: HOSPITAL | Age: 73
End: 2018-06-28

## 2018-06-28 LAB
ABO + RH BLD: NORMAL
ABO + RH BLD: NORMAL
ANION GAP SERPL CALCULATED.3IONS-SCNC: 12 MMOL/L (ref 3–11)
BH BB BLOOD EXPIRATION DATE: NORMAL
BH BB BLOOD EXPIRATION DATE: NORMAL
BH BB BLOOD TYPE BARCODE: 5100
BH BB BLOOD TYPE BARCODE: 5100
BH BB DISPENSE STATUS: NORMAL
BH BB DISPENSE STATUS: NORMAL
BH BB PRODUCT CODE: NORMAL
BH BB PRODUCT CODE: NORMAL
BH BB UNIT NUMBER: NORMAL
BH BB UNIT NUMBER: NORMAL
BUN BLD-MCNC: 32 MG/DL (ref 9–23)
BUN/CREAT SERPL: 21.3 (ref 7–25)
CALCIUM SPEC-SCNC: 10.3 MG/DL (ref 8.7–10.4)
CHLORIDE SERPL-SCNC: 104 MMOL/L (ref 99–109)
CO2 SERPL-SCNC: 20 MMOL/L (ref 20–31)
CREAT BLD-MCNC: 1.5 MG/DL (ref 0.6–1.3)
CROSSMATCH INTERPRETATION: NORMAL
CROSSMATCH INTERPRETATION: NORMAL
DEPRECATED RDW RBC AUTO: 57.9 FL (ref 37–54)
ERYTHROCYTE [DISTWIDTH] IN BLOOD BY AUTOMATED COUNT: 16.4 % (ref 11.3–14.5)
GFR SERPL CREATININE-BSD FRML MDRD: 46 ML/MIN/1.73
GLUCOSE BLD-MCNC: 123 MG/DL (ref 70–100)
GLUCOSE BLDC GLUCOMTR-MCNC: 124 MG/DL (ref 70–130)
GLUCOSE BLDC GLUCOMTR-MCNC: 135 MG/DL (ref 70–130)
GLUCOSE BLDC GLUCOMTR-MCNC: 139 MG/DL (ref 70–130)
GLUCOSE BLDC GLUCOMTR-MCNC: 143 MG/DL (ref 70–130)
GLUCOSE BLDC GLUCOMTR-MCNC: 148 MG/DL (ref 70–130)
GLUCOSE BLDC GLUCOMTR-MCNC: 157 MG/DL (ref 70–130)
GLUCOSE BLDC GLUCOMTR-MCNC: 74 MG/DL (ref 70–130)
GLUCOSE BLDC GLUCOMTR-MCNC: 90 MG/DL (ref 70–130)
HCT VFR BLD AUTO: 30 % (ref 38.9–50.9)
HGB BLD-MCNC: 9.7 G/DL (ref 13.1–17.5)
MCH RBC QN AUTO: 31.2 PG (ref 27–31)
MCHC RBC AUTO-ENTMCNC: 32.3 G/DL (ref 32–36)
MCV RBC AUTO: 96.5 FL (ref 80–99)
PLATELET # BLD AUTO: 299 10*3/MM3 (ref 150–450)
PMV BLD AUTO: 9.5 FL (ref 6–12)
POTASSIUM BLD-SCNC: 4.8 MMOL/L (ref 3.5–5.5)
RBC # BLD AUTO: 3.11 10*6/MM3 (ref 4.2–5.76)
SODIUM BLD-SCNC: 136 MMOL/L (ref 132–146)
UNIT  ABO: NORMAL
UNIT  ABO: NORMAL
UNIT  RH: NORMAL
UNIT  RH: NORMAL
WBC NRBC COR # BLD: 7.49 10*3/MM3 (ref 3.5–10.8)

## 2018-06-28 PROCEDURE — 80048 BASIC METABOLIC PNL TOTAL CA: CPT | Performed by: PHYSICIAN ASSISTANT

## 2018-06-28 PROCEDURE — 76775 US EXAM ABDO BACK WALL LIM: CPT

## 2018-06-28 PROCEDURE — 63710000001 FLINTSTONES COMPLETE 60 MG CHEWABLE TABLET: Performed by: NEUROLOGICAL SURGERY

## 2018-06-28 PROCEDURE — 97116 GAIT TRAINING THERAPY: CPT

## 2018-06-28 PROCEDURE — 99024 POSTOP FOLLOW-UP VISIT: CPT | Performed by: PHYSICIAN ASSISTANT

## 2018-06-28 PROCEDURE — 85027 COMPLETE CBC AUTOMATED: CPT | Performed by: PHYSICIAN ASSISTANT

## 2018-06-28 PROCEDURE — 25010000002 LORAZEPAM PER 2 MG: Performed by: INTERNAL MEDICINE

## 2018-06-28 PROCEDURE — 97530 THERAPEUTIC ACTIVITIES: CPT | Performed by: OCCUPATIONAL THERAPIST

## 2018-06-28 PROCEDURE — 82962 GLUCOSE BLOOD TEST: CPT

## 2018-06-28 PROCEDURE — 99024 POSTOP FOLLOW-UP VISIT: CPT | Performed by: NEUROLOGICAL SURGERY

## 2018-06-28 PROCEDURE — 97530 THERAPEUTIC ACTIVITIES: CPT

## 2018-06-28 PROCEDURE — 99233 SBSQ HOSP IP/OBS HIGH 50: CPT | Performed by: HOSPITALIST

## 2018-06-28 RX ORDER — OXYCODONE HCL 10 MG/1
10 TABLET, FILM COATED, EXTENDED RELEASE ORAL EVERY 12 HOURS SCHEDULED
Status: DISCONTINUED | OUTPATIENT
Start: 2018-06-28 | End: 2018-06-28

## 2018-06-28 RX ORDER — LORAZEPAM 2 MG/ML
0.25 INJECTION INTRAMUSCULAR ONCE
Status: COMPLETED | OUTPATIENT
Start: 2018-06-28 | End: 2018-06-28

## 2018-06-28 RX ORDER — LORAZEPAM 2 MG/ML
0.25 INJECTION INTRAMUSCULAR ONCE
Status: DISCONTINUED | OUTPATIENT
Start: 2018-06-28 | End: 2018-06-28

## 2018-06-28 RX ADMIN — METHOCARBAMOL 750 MG: 750 TABLET ORAL at 08:20

## 2018-06-28 RX ADMIN — IPRATROPIUM BROMIDE 2 SPRAY: 42 SPRAY NASAL at 08:24

## 2018-06-28 RX ADMIN — FINASTERIDE 5 MG: 5 TABLET, FILM COATED ORAL at 08:20

## 2018-06-28 RX ADMIN — BISACODYL 10 MG: 5 TABLET, COATED ORAL at 08:20

## 2018-06-28 RX ADMIN — SODIUM CHLORIDE 100 ML/HR: 9 INJECTION, SOLUTION INTRAVENOUS at 08:19

## 2018-06-28 RX ADMIN — POLYETHYLENE GLYCOL (3350) 17 G: 17 POWDER, FOR SOLUTION ORAL at 08:20

## 2018-06-28 RX ADMIN — TAMSULOSIN HYDROCHLORIDE 0.4 MG: 0.4 CAPSULE ORAL at 08:20

## 2018-06-28 RX ADMIN — ACETAMINOPHEN 650 MG: 325 TABLET ORAL at 11:28

## 2018-06-28 RX ADMIN — SENNOSIDES AND DOCUSATE SODIUM 2 TABLET: 8.6; 5 TABLET ORAL at 08:20

## 2018-06-28 RX ADMIN — METHOCARBAMOL 750 MG: 750 TABLET ORAL at 11:27

## 2018-06-28 RX ADMIN — PANTOPRAZOLE SODIUM 40 MG: 40 TABLET, DELAYED RELEASE ORAL at 08:20

## 2018-06-28 RX ADMIN — CARVEDILOL 3.12 MG: 3.12 TABLET, FILM COATED ORAL at 08:21

## 2018-06-28 RX ADMIN — LISINOPRIL 20 MG: 20 TABLET ORAL at 08:20

## 2018-06-28 RX ADMIN — Medication 1 TABLET: at 08:20

## 2018-06-28 RX ADMIN — LORAZEPAM 0.25 MG: 2 INJECTION INTRAMUSCULAR; INTRAVENOUS at 20:23

## 2018-06-28 RX ADMIN — SERTRALINE HYDROCHLORIDE 100 MG: 100 TABLET ORAL at 08:21

## 2018-06-28 RX ADMIN — FAMOTIDINE 20 MG: 20 TABLET ORAL at 08:20

## 2018-06-28 RX ADMIN — FUROSEMIDE 20 MG: 40 TABLET ORAL at 08:21

## 2018-06-28 RX ADMIN — ATORVASTATIN CALCIUM 10 MG: 10 TABLET, FILM COATED ORAL at 08:21

## 2018-06-29 LAB
GLUCOSE BLDC GLUCOMTR-MCNC: 187 MG/DL (ref 70–130)
GLUCOSE BLDC GLUCOMTR-MCNC: 188 MG/DL (ref 70–130)
GLUCOSE BLDC GLUCOMTR-MCNC: 201 MG/DL (ref 70–130)

## 2018-06-29 PROCEDURE — 25010000002 HYDROMORPHONE PER 4 MG: Performed by: NEUROLOGICAL SURGERY

## 2018-06-29 PROCEDURE — 25010000002 LORAZEPAM PER 2 MG: Performed by: NURSE PRACTITIONER

## 2018-06-29 PROCEDURE — 82962 GLUCOSE BLOOD TEST: CPT

## 2018-06-29 PROCEDURE — 63710000001 INSULIN LISPRO (HUMAN) PER 5 UNITS: Performed by: HOSPITALIST

## 2018-06-29 PROCEDURE — 25010000002 HALOPERIDOL LACTATE PER 5 MG: Performed by: HOSPITALIST

## 2018-06-29 PROCEDURE — 99233 SBSQ HOSP IP/OBS HIGH 50: CPT | Performed by: HOSPITALIST

## 2018-06-29 PROCEDURE — 25010000002 HALOPERIDOL LACTATE PER 5 MG: Performed by: NURSE PRACTITIONER

## 2018-06-29 RX ORDER — HALOPERIDOL 5 MG/ML
1 INJECTION INTRAMUSCULAR EVERY 6 HOURS PRN
Status: DISCONTINUED | OUTPATIENT
Start: 2018-06-29 | End: 2018-07-05 | Stop reason: HOSPADM

## 2018-06-29 RX ORDER — ENALAPRILAT 2.5 MG/2ML
1.25 INJECTION INTRAVENOUS ONCE
Status: COMPLETED | OUTPATIENT
Start: 2018-06-29 | End: 2018-06-29

## 2018-06-29 RX ORDER — LABETALOL HYDROCHLORIDE 5 MG/ML
10 INJECTION, SOLUTION INTRAVENOUS EVERY 6 HOURS PRN
Status: DISCONTINUED | OUTPATIENT
Start: 2018-06-29 | End: 2018-07-05 | Stop reason: HOSPADM

## 2018-06-29 RX ORDER — HALOPERIDOL 5 MG/1
5 TABLET ORAL EVERY 8 HOURS SCHEDULED
Status: DISCONTINUED | OUTPATIENT
Start: 2018-06-29 | End: 2018-07-03

## 2018-06-29 RX ORDER — GABAPENTIN 400 MG/1
400 CAPSULE ORAL NIGHTLY
Status: DISCONTINUED | OUTPATIENT
Start: 2018-06-29 | End: 2018-07-05 | Stop reason: HOSPADM

## 2018-06-29 RX ORDER — HALOPERIDOL 2 MG/ML
2 SOLUTION ORAL ONCE
Status: DISCONTINUED | OUTPATIENT
Start: 2018-06-29 | End: 2018-06-29

## 2018-06-29 RX ORDER — SENNA AND DOCUSATE SODIUM 50; 8.6 MG/1; MG/1
1 TABLET, FILM COATED ORAL 2 TIMES DAILY
Status: DISCONTINUED | OUTPATIENT
Start: 2018-06-29 | End: 2018-07-05 | Stop reason: HOSPADM

## 2018-06-29 RX ORDER — LORAZEPAM 2 MG/ML
0.5 INJECTION INTRAMUSCULAR ONCE
Status: COMPLETED | OUTPATIENT
Start: 2018-06-29 | End: 2018-06-29

## 2018-06-29 RX ORDER — HALOPERIDOL 5 MG/ML
2 INJECTION INTRAMUSCULAR ONCE
Status: COMPLETED | OUTPATIENT
Start: 2018-06-29 | End: 2018-06-29

## 2018-06-29 RX ORDER — OXYCODONE HYDROCHLORIDE AND ACETAMINOPHEN 5; 325 MG/1; MG/1
1 TABLET ORAL EVERY 6 HOURS PRN
Status: DISCONTINUED | OUTPATIENT
Start: 2018-06-29 | End: 2018-07-03

## 2018-06-29 RX ORDER — QUETIAPINE FUMARATE 25 MG/1
25 TABLET, FILM COATED ORAL EVERY 12 HOURS SCHEDULED
Status: DISCONTINUED | OUTPATIENT
Start: 2018-06-29 | End: 2018-06-30

## 2018-06-29 RX ORDER — BISACODYL 10 MG
10 SUPPOSITORY, RECTAL RECTAL DAILY PRN
Status: DISCONTINUED | OUTPATIENT
Start: 2018-06-29 | End: 2018-07-05 | Stop reason: HOSPADM

## 2018-06-29 RX ADMIN — ACETAMINOPHEN 650 MG: 325 TABLET ORAL at 20:03

## 2018-06-29 RX ADMIN — ATORVASTATIN CALCIUM 10 MG: 10 TABLET, FILM COATED ORAL at 07:22

## 2018-06-29 RX ADMIN — GABAPENTIN 300 MG: 300 CAPSULE ORAL at 07:19

## 2018-06-29 RX ADMIN — PANTOPRAZOLE SODIUM 40 MG: 40 TABLET, DELAYED RELEASE ORAL at 07:23

## 2018-06-29 RX ADMIN — GABAPENTIN 400 MG: 400 CAPSULE ORAL at 20:00

## 2018-06-29 RX ADMIN — FINASTERIDE 5 MG: 5 TABLET, FILM COATED ORAL at 07:24

## 2018-06-29 RX ADMIN — HYDROMORPHONE HYDROCHLORIDE 0.5 MG: 1 INJECTION, SOLUTION INTRAMUSCULAR; INTRAVENOUS; SUBCUTANEOUS at 10:02

## 2018-06-29 RX ADMIN — OXYBUTYNIN CHLORIDE 5 MG: 5 TABLET ORAL at 20:00

## 2018-06-29 RX ADMIN — SERTRALINE HYDROCHLORIDE 100 MG: 100 TABLET ORAL at 07:23

## 2018-06-29 RX ADMIN — TAMSULOSIN HYDROCHLORIDE 0.4 MG: 0.4 CAPSULE ORAL at 07:21

## 2018-06-29 RX ADMIN — FAMOTIDINE 20 MG: 20 TABLET ORAL at 20:00

## 2018-06-29 RX ADMIN — PANTOPRAZOLE SODIUM 40 MG: 40 TABLET, DELAYED RELEASE ORAL at 20:00

## 2018-06-29 RX ADMIN — SENNOSIDES AND DOCUSATE SODIUM 2 TABLET: 8.6; 5 TABLET ORAL at 20:00

## 2018-06-29 RX ADMIN — ENALAPRILAT 1.25 MG: 1.25 INJECTION INTRAVENOUS at 05:55

## 2018-06-29 RX ADMIN — HYDROMORPHONE HYDROCHLORIDE 0.5 MG: 1 INJECTION, SOLUTION INTRAMUSCULAR; INTRAVENOUS; SUBCUTANEOUS at 07:26

## 2018-06-29 RX ADMIN — BISACODYL 10 MG: 5 TABLET, COATED ORAL at 07:24

## 2018-06-29 RX ADMIN — LABETALOL HYDROCHLORIDE 10 MG: 5 INJECTION INTRAVENOUS at 16:07

## 2018-06-29 RX ADMIN — METHOCARBAMOL 750 MG: 750 TABLET ORAL at 07:23

## 2018-06-29 RX ADMIN — HALOPERIDOL 5 MG: 5 TABLET ORAL at 20:01

## 2018-06-29 RX ADMIN — HALOPERIDOL 5 MG: 5 TABLET ORAL at 15:19

## 2018-06-29 RX ADMIN — FAMOTIDINE 20 MG: 20 TABLET ORAL at 07:23

## 2018-06-29 RX ADMIN — HALOPERIDOL LACTATE 2 MG: 5 INJECTION, SOLUTION INTRAMUSCULAR at 14:24

## 2018-06-29 RX ADMIN — OXYCODONE HYDROCHLORIDE AND ACETAMINOPHEN 1 TABLET: 5; 325 TABLET ORAL at 17:09

## 2018-06-29 RX ADMIN — HALOPERIDOL LACTATE 2 MG: 5 INJECTION, SOLUTION INTRAMUSCULAR at 04:33

## 2018-06-29 RX ADMIN — LORAZEPAM 0.5 MG: 2 INJECTION INTRAMUSCULAR; INTRAVENOUS at 00:32

## 2018-06-29 RX ADMIN — QUETIAPINE FUMARATE 25 MG: 25 TABLET ORAL at 09:17

## 2018-06-29 RX ADMIN — QUETIAPINE FUMARATE 25 MG: 25 TABLET ORAL at 20:01

## 2018-06-29 RX ADMIN — FUROSEMIDE 20 MG: 40 TABLET ORAL at 07:22

## 2018-06-29 RX ADMIN — CARVEDILOL 3.12 MG: 3.12 TABLET, FILM COATED ORAL at 07:19

## 2018-06-29 RX ADMIN — INSULIN LISPRO 2 UNITS: 100 INJECTION, SOLUTION INTRAVENOUS; SUBCUTANEOUS at 20:03

## 2018-06-29 RX ADMIN — LISINOPRIL 20 MG: 20 TABLET ORAL at 07:19

## 2018-06-29 RX ADMIN — SERTRALINE HYDROCHLORIDE 150 MG: 100 TABLET ORAL at 20:00

## 2018-06-29 RX ADMIN — SENNOSIDES AND DOCUSATE SODIUM 2 TABLET: 8.6; 5 TABLET ORAL at 07:22

## 2018-06-29 RX ADMIN — TAMSULOSIN HYDROCHLORIDE 0.4 MG: 0.4 CAPSULE ORAL at 20:00

## 2018-06-29 RX ADMIN — Medication 5 MG: at 20:00

## 2018-06-29 RX ADMIN — CARVEDILOL 3.12 MG: 3.12 TABLET, FILM COATED ORAL at 17:09

## 2018-06-30 LAB
ANION GAP SERPL CALCULATED.3IONS-SCNC: 15 MMOL/L (ref 3–11)
BUN BLD-MCNC: 26 MG/DL (ref 9–23)
BUN/CREAT SERPL: 24.3 (ref 7–25)
CALCIUM SPEC-SCNC: 10.5 MG/DL (ref 8.7–10.4)
CHLORIDE SERPL-SCNC: 98 MMOL/L (ref 99–109)
CO2 SERPL-SCNC: 23 MMOL/L (ref 20–31)
CREAT BLD-MCNC: 1.07 MG/DL (ref 0.6–1.3)
DEPRECATED RDW RBC AUTO: 53.6 FL (ref 37–54)
ERYTHROCYTE [DISTWIDTH] IN BLOOD BY AUTOMATED COUNT: 15.2 % (ref 11.3–14.5)
GFR SERPL CREATININE-BSD FRML MDRD: 68 ML/MIN/1.73
GLUCOSE BLD-MCNC: 243 MG/DL (ref 70–100)
GLUCOSE BLDC GLUCOMTR-MCNC: 203 MG/DL (ref 70–130)
GLUCOSE BLDC GLUCOMTR-MCNC: 204 MG/DL (ref 70–130)
GLUCOSE BLDC GLUCOMTR-MCNC: 207 MG/DL (ref 70–130)
GLUCOSE BLDC GLUCOMTR-MCNC: 220 MG/DL (ref 70–130)
HCT VFR BLD AUTO: 34.7 % (ref 38.9–50.9)
HGB BLD-MCNC: 11.3 G/DL (ref 13.1–17.5)
MCH RBC QN AUTO: 31.2 PG (ref 27–31)
MCHC RBC AUTO-ENTMCNC: 32.6 G/DL (ref 32–36)
MCV RBC AUTO: 95.9 FL (ref 80–99)
PLATELET # BLD AUTO: 435 10*3/MM3 (ref 150–450)
PMV BLD AUTO: 9.1 FL (ref 6–12)
POTASSIUM BLD-SCNC: 4.2 MMOL/L (ref 3.5–5.5)
RBC # BLD AUTO: 3.62 10*6/MM3 (ref 4.2–5.76)
SODIUM BLD-SCNC: 136 MMOL/L (ref 132–146)
WBC NRBC COR # BLD: 10.48 10*3/MM3 (ref 3.5–10.8)

## 2018-06-30 PROCEDURE — 63710000001 FLINTSTONES COMPLETE 60 MG CHEWABLE TABLET: Performed by: NEUROLOGICAL SURGERY

## 2018-06-30 PROCEDURE — 99024 POSTOP FOLLOW-UP VISIT: CPT | Performed by: NEUROLOGICAL SURGERY

## 2018-06-30 PROCEDURE — 85027 COMPLETE CBC AUTOMATED: CPT | Performed by: HOSPITALIST

## 2018-06-30 PROCEDURE — 99233 SBSQ HOSP IP/OBS HIGH 50: CPT | Performed by: HOSPITALIST

## 2018-06-30 PROCEDURE — 80048 BASIC METABOLIC PNL TOTAL CA: CPT | Performed by: HOSPITALIST

## 2018-06-30 PROCEDURE — 82962 GLUCOSE BLOOD TEST: CPT

## 2018-06-30 PROCEDURE — 97116 GAIT TRAINING THERAPY: CPT | Performed by: PHYSICAL THERAPIST

## 2018-06-30 RX ADMIN — FINASTERIDE 5 MG: 5 TABLET, FILM COATED ORAL at 09:59

## 2018-06-30 RX ADMIN — Medication 1 TABLET: at 09:56

## 2018-06-30 RX ADMIN — ACETAMINOPHEN 650 MG: 325 TABLET ORAL at 05:14

## 2018-06-30 RX ADMIN — INSULIN LISPRO 3 UNITS: 100 INJECTION, SOLUTION INTRAVENOUS; SUBCUTANEOUS at 22:06

## 2018-06-30 RX ADMIN — FAMOTIDINE 20 MG: 20 TABLET ORAL at 21:39

## 2018-06-30 RX ADMIN — GABAPENTIN 300 MG: 300 CAPSULE ORAL at 09:59

## 2018-06-30 RX ADMIN — CARVEDILOL 3.12 MG: 3.12 TABLET, FILM COATED ORAL at 09:56

## 2018-06-30 RX ADMIN — FAMOTIDINE 20 MG: 20 TABLET ORAL at 09:58

## 2018-06-30 RX ADMIN — TAMSULOSIN HYDROCHLORIDE 0.4 MG: 0.4 CAPSULE ORAL at 09:58

## 2018-06-30 RX ADMIN — INSULIN LISPRO 3 UNITS: 100 INJECTION, SOLUTION INTRAVENOUS; SUBCUTANEOUS at 21:45

## 2018-06-30 RX ADMIN — LISINOPRIL 20 MG: 20 TABLET ORAL at 09:57

## 2018-06-30 RX ADMIN — FUROSEMIDE 20 MG: 40 TABLET ORAL at 09:55

## 2018-06-30 RX ADMIN — INSULIN LISPRO 3 UNITS: 100 INJECTION, SOLUTION INTRAVENOUS; SUBCUTANEOUS at 10:00

## 2018-06-30 RX ADMIN — PANTOPRAZOLE SODIUM 40 MG: 40 TABLET, DELAYED RELEASE ORAL at 22:11

## 2018-06-30 RX ADMIN — SERTRALINE HYDROCHLORIDE 100 MG: 100 TABLET ORAL at 09:57

## 2018-06-30 RX ADMIN — HALOPERIDOL 5 MG: 5 TABLET ORAL at 05:14

## 2018-06-30 RX ADMIN — ATORVASTATIN CALCIUM 10 MG: 10 TABLET, FILM COATED ORAL at 10:01

## 2018-06-30 RX ADMIN — HALOPERIDOL 5 MG: 5 TABLET ORAL at 21:39

## 2018-06-30 RX ADMIN — TAMSULOSIN HYDROCHLORIDE 0.4 MG: 0.4 CAPSULE ORAL at 21:40

## 2018-06-30 RX ADMIN — PANTOPRAZOLE SODIUM 40 MG: 40 TABLET, DELAYED RELEASE ORAL at 09:58

## 2018-06-30 RX ADMIN — INSULIN LISPRO 3 UNITS: 100 INJECTION, SOLUTION INTRAVENOUS; SUBCUTANEOUS at 12:11

## 2018-06-30 RX ADMIN — OXYBUTYNIN CHLORIDE 5 MG: 5 TABLET ORAL at 21:39

## 2018-06-30 RX ADMIN — SERTRALINE HYDROCHLORIDE 150 MG: 100 TABLET ORAL at 21:46

## 2018-06-30 RX ADMIN — SERTRALINE HYDROCHLORIDE 150 MG: 100 TABLET ORAL at 22:05

## 2018-06-30 RX ADMIN — GABAPENTIN 400 MG: 400 CAPSULE ORAL at 21:38

## 2018-06-30 RX ADMIN — ACETAMINOPHEN 650 MG: 325 TABLET ORAL at 10:57

## 2018-06-30 RX ADMIN — BISACODYL 10 MG: 5 TABLET, COATED ORAL at 10:06

## 2018-07-01 LAB
GLUCOSE BLDC GLUCOMTR-MCNC: 186 MG/DL (ref 70–130)
GLUCOSE BLDC GLUCOMTR-MCNC: 190 MG/DL (ref 70–130)
GLUCOSE BLDC GLUCOMTR-MCNC: 202 MG/DL (ref 70–130)
GLUCOSE BLDC GLUCOMTR-MCNC: 203 MG/DL (ref 70–130)

## 2018-07-01 PROCEDURE — 97530 THERAPEUTIC ACTIVITIES: CPT

## 2018-07-01 PROCEDURE — 99232 SBSQ HOSP IP/OBS MODERATE 35: CPT | Performed by: NURSE PRACTITIONER

## 2018-07-01 PROCEDURE — 63710000001 INSULIN DETEMIR PER 5 UNITS: Performed by: NURSE PRACTITIONER

## 2018-07-01 PROCEDURE — 63710000001 FLINTSTONES COMPLETE 60 MG CHEWABLE TABLET: Performed by: NEUROLOGICAL SURGERY

## 2018-07-01 PROCEDURE — 82962 GLUCOSE BLOOD TEST: CPT

## 2018-07-01 PROCEDURE — 99024 POSTOP FOLLOW-UP VISIT: CPT | Performed by: NEUROLOGICAL SURGERY

## 2018-07-01 RX ADMIN — Medication 5 MG: at 21:21

## 2018-07-01 RX ADMIN — LISINOPRIL 20 MG: 20 TABLET ORAL at 08:27

## 2018-07-01 RX ADMIN — PANTOPRAZOLE SODIUM 40 MG: 40 TABLET, DELAYED RELEASE ORAL at 08:26

## 2018-07-01 RX ADMIN — INSULIN LISPRO 3 UNITS: 100 INJECTION, SOLUTION INTRAVENOUS; SUBCUTANEOUS at 11:43

## 2018-07-01 RX ADMIN — ATORVASTATIN CALCIUM 10 MG: 10 TABLET, FILM COATED ORAL at 08:26

## 2018-07-01 RX ADMIN — TAMSULOSIN HYDROCHLORIDE 0.4 MG: 0.4 CAPSULE ORAL at 21:21

## 2018-07-01 RX ADMIN — SERTRALINE HYDROCHLORIDE 100 MG: 100 TABLET ORAL at 08:27

## 2018-07-01 RX ADMIN — OXYCODONE HYDROCHLORIDE AND ACETAMINOPHEN 1 TABLET: 5; 325 TABLET ORAL at 11:44

## 2018-07-01 RX ADMIN — GABAPENTIN 400 MG: 400 CAPSULE ORAL at 21:21

## 2018-07-01 RX ADMIN — OXYCODONE HYDROCHLORIDE AND ACETAMINOPHEN 1 TABLET: 5; 325 TABLET ORAL at 17:38

## 2018-07-01 RX ADMIN — SENNOSIDES AND DOCUSATE SODIUM 1 TABLET: 8.6; 5 TABLET ORAL at 21:20

## 2018-07-01 RX ADMIN — IPRATROPIUM BROMIDE 2 SPRAY: 42 SPRAY NASAL at 06:24

## 2018-07-01 RX ADMIN — HALOPERIDOL 5 MG: 5 TABLET ORAL at 21:37

## 2018-07-01 RX ADMIN — OXYBUTYNIN CHLORIDE 5 MG: 5 TABLET ORAL at 21:21

## 2018-07-01 RX ADMIN — GABAPENTIN 300 MG: 300 CAPSULE ORAL at 08:27

## 2018-07-01 RX ADMIN — TAMSULOSIN HYDROCHLORIDE 0.4 MG: 0.4 CAPSULE ORAL at 08:27

## 2018-07-01 RX ADMIN — SERTRALINE HYDROCHLORIDE 150 MG: 100 TABLET ORAL at 21:20

## 2018-07-01 RX ADMIN — ACETAMINOPHEN 650 MG: 325 TABLET ORAL at 15:02

## 2018-07-01 RX ADMIN — HALOPERIDOL 5 MG: 5 TABLET ORAL at 13:31

## 2018-07-01 RX ADMIN — FAMOTIDINE 20 MG: 20 TABLET ORAL at 21:21

## 2018-07-01 RX ADMIN — OXYCODONE HYDROCHLORIDE AND ACETAMINOPHEN 1 TABLET: 5; 325 TABLET ORAL at 06:23

## 2018-07-01 RX ADMIN — BISACODYL 10 MG: 5 TABLET, COATED ORAL at 08:27

## 2018-07-01 RX ADMIN — ACETAMINOPHEN 650 MG: 325 TABLET ORAL at 21:27

## 2018-07-01 RX ADMIN — INSULIN LISPRO 2 UNITS: 100 INJECTION, SOLUTION INTRAVENOUS; SUBCUTANEOUS at 21:23

## 2018-07-01 RX ADMIN — FUROSEMIDE 20 MG: 40 TABLET ORAL at 08:27

## 2018-07-01 RX ADMIN — HALOPERIDOL 5 MG: 5 TABLET ORAL at 06:22

## 2018-07-01 RX ADMIN — CARVEDILOL 3.12 MG: 3.12 TABLET, FILM COATED ORAL at 08:26

## 2018-07-01 RX ADMIN — CARVEDILOL 3.12 MG: 3.12 TABLET, FILM COATED ORAL at 17:15

## 2018-07-01 RX ADMIN — FAMOTIDINE 20 MG: 20 TABLET ORAL at 08:26

## 2018-07-01 RX ADMIN — INSULIN LISPRO 2 UNITS: 100 INJECTION, SOLUTION INTRAVENOUS; SUBCUTANEOUS at 17:15

## 2018-07-01 RX ADMIN — INSULIN LISPRO 3 UNITS: 100 INJECTION, SOLUTION INTRAVENOUS; SUBCUTANEOUS at 08:27

## 2018-07-01 RX ADMIN — INSULIN DETEMIR 5 UNITS: 100 INJECTION, SOLUTION SUBCUTANEOUS at 11:44

## 2018-07-01 RX ADMIN — Medication 1 TABLET: at 09:39

## 2018-07-01 RX ADMIN — FINASTERIDE 5 MG: 5 TABLET, FILM COATED ORAL at 08:27

## 2018-07-01 RX ADMIN — OXYCODONE HYDROCHLORIDE AND ACETAMINOPHEN 1 TABLET: 5; 325 TABLET ORAL at 01:04

## 2018-07-01 NOTE — PROGRESS NOTES
[facility] Medicine Services  PROGRESS NOTE    Patient Name: Sai Main  : 1945  MRN: [id number]    Date of Admission: 2018  Length of Stay: 9  Primary Care Physician: Jose Pereira MD    Subjective   Subjective     CC:  F/U medical management    HPI:  Resting in bed this morning.  Wife at bedside.  Reports that he still isn't sleeping well at night.  Having a burning pain in his right buttock that sometimes radiates down the leg.      Review of Systems  CV-no chest pain, no palpitations  Resp-no cough, no dyspnea  GI-no N/V/D, no abd pain      Otherwise ROS is negative except as mentioned in the HPI.    Objective   Objective     Vital Signs:   Temp:  [97.5 °F (36.4 °C)-98.1 °F (36.7 °C)] 97.5 °F (36.4 °C)  Heart Rate:  [] 76  Resp:  [16-20] 17  BP: (151-201)/(8-91) 197/69        Physical Exam:  Gen-no acute distress, awake, alert, resting in bed with wife at bedside.   HENT- normocephalic, mucous membranes moist.  CV-RRR, S1 S2 normal, no m/r/g  Resp-CTAB, no wheezes, unlabored breathing on room air  Abd-soft, NT, ND, +BS  Ext-no edema  Neuro-A&Ox3, no focal deficits  Psych-appropriate mood, cooperative      Results Reviewed:  I have personally reviewed current lab, radiology, and data and agree.      Results from last 7 days  Lab Units 18  0954 18  04018  09   WBC 10*3/mm3 10.48 7.49 7.55   HEMOGLOBIN g/dL 11.3* 9.7* 9.0*   HEMATOCRIT % 34.7* 30.0* 27.9*   PLATELETS 10*3/mm3 435 299 230       Results from last 7 days  Lab Units 18  0954 18  0407 18  0926 18  1526   SODIUM mmol/L 136 136 136 134   POTASSIUM mmol/L 4.2 4.8 4.8 5.1   CHLORIDE mmol/L 98* 104 104 100   CO2 mmol/L 23.0 20.0 26.0 24.0   BUN mg/dL 26* 32* 43* 31*   CREATININE mg/dL 1.07 1.50* 2.02* 1.92*   GLUCOSE mg/dL 243* 123* 81 148*   CALCIUM mg/dL 10.5* 10.3 8.6* 9.7   ALT (SGPT) U/L  --   --   --  14   AST (SGOT) U/L  --   --   --  29      Estimated Creatinine Clearance: 81.2 mL/min (by C-G formula based on SCr of 1.07 mg/dL).  No results found for: BNP    Microbiology Results Abnormal     Procedure Component Value - Date/Time    Eosinophil Smear - Urine, Urine, Clean Catch [[id number]]  (Normal) Collected:  06/27/18 2227    Lab Status:  Final result Specimen:  Urine from Urine, Clean Catch Updated:  06/27/18 2338     Eosinophil Smear 0 % EOS/100 Cells     Narrative:       No eosinophil seen          Imaging Results (last 24 hours)     ** No results found for the last 24 hours. **             I have reviewed the medications.    Assessment/Plan   Assessment / Plan     Hospital Problem List     Physical deconditioning    Spondylosis with myelopathy, lumbar region    Overview Signed 5/23/2018 11:36 AM by Malina Irving PA-C     Added automatically from request for surgery [id number]         Spinal stenosis, lumbar region, with neurogenic claudication    Overview Signed 5/23/2018 11:36 AM by Malina Irving PA-C     Added automatically from request for surgery [id number]         Degenerative disc disease, lumbar    Overview Signed 5/23/2018 11:36 AM by Malina Irving PA-C     Added automatically from request for surgery [id number]         Osteoarthritis of spine with radiculopathy, lumbar region    Overview Signed 5/23/2018 11:36 AM by Malina Irving PA-C     Added automatically from request for surgery [id number]         Spondylosis    Impaired functional mobility, balance, gait, and endurance    Metabolic encephalopathy    AYE (acute kidney injury)    Anemia    Hypotension    Hypoglycemia             Brief Hospital Course to date:  Sai Main is a 72 y.o. male who presented to the hospital on 6/22/18 for planned surgical intervention for lumbar spinal stenosis with Dr. Cantu. He has had complicated post-operative course including acute blood loss anemia requiring blood transfusion, confusion and disorientation, hypotension, hypoglycemia, and  acute kidney injury. Hospitalist service consulted 6/27 for medical management.       Assessment & Plan:  --Post-op care per Dr. Cantu.  --Confusion most likely due to delirium caused by narcotics, post-op state, etc. Stopped scheduled pain meds. Dr. Cantu started scheduled Haldol yesterday. His mental status is much better.    --Still needs some assistance with sleep cycle regulation.  Melatonin ordered.  Nursing to give a dose tonight please.   --Hypoglycemia resolved. HbA1c is only 5.7%. Resumed low dose SSI.  Added low dose levemir, as he is still running in the 200s.  --AYE improved with fluids. Probably due to hypotension, acute blood loss anemia. Renal ultrasound unremarkable. Continue to hold NSAIDs and other nephrotoxic meds.  --Hypotension resolved with fluids, now hypertensive. Give BP meds.  Labetalol PRN  --Anemia stable s/p 2 units PRBC.  --Mental status much improved, probably close to baseline now. No further need for a sitter.    CODE STATUS:   Code Status and Medical Interventions:   Ordered at: 06/22/18 4153     Level Of Support Discussed With:    Patient     Code Status:    CPR     Medical Interventions (Level of Support Prior to Arrest):    Full       Disposition: I expect the patient to be discharged to [facility] per Neurosurgery      Electronically signed by ROVERTO Tian, 07/01/18, 2:47 PM.

## 2018-07-01 NOTE — THERAPY TREATMENT NOTE
Acute Care - Occupational Therapy Treatment Note  [facility]     Patient Name: Sai Main  : 1945  MRN: [id number]  Today's Date: 2018  Onset of Illness/Injury or Date of Surgery: 18  Date of Referral to OT: 18  Referring Physician: MD Peep    Admit Date: 2018       ICD-10-CM ICD-9-CM   1. Impaired functional mobility, balance, gait, and endurance Z74.09 V49.89   2. Impaired mobility and ADLs Z74.09 799.89     Patient Active Problem List   Diagnosis   • Episode of change in speech   • Spinal stenosis of lumbar region with neurogenic claudication   • Radicular pain of both lower extremities   • Chronic bilateral low back pain with bilateral sciatica   • DDD (degenerative disc disease), lumbar   • Spondylosis of lumbar region without myelopathy or radiculopathy   • Congestive heart failure   • IDDM (insulin dependent diabetes mellitus)   • Insomnia   • Physical deconditioning   • Anxiety and depression   • At high risk for falls   • Gait disturbance   • Diabetic polyneuropathy associated with type 1 diabetes mellitus   • Left foot drop   • Moderate obesity   • Spondylosis with myelopathy, lumbar region   • Spinal stenosis, lumbar region, with neurogenic claudication   • Degenerative disc disease, lumbar   • Osteoarthritis of spine with radiculopathy, lumbar region   • Spondylosis   • Impaired functional mobility, balance, gait, and endurance   • Metabolic encephalopathy   • AYE (acute kidney injury)   • Anemia   • Hypotension   • Hypoglycemia     Past Medical History:   Diagnosis Date   • Anemia    • Arthritis    • Bleeding disorder    • CHF (congestive heart failure)    • DDD (degenerative disc disease), lumbar    • Diabetes mellitus    • DJD (degenerative joint disease), lumbar    • Foot drop    • Heart murmur    • Hernia    • History of transfusion    • Hypertension    • Low back pain    • Neurogenic claudication due to lumbar spinal stenosis    • Radiculopathy with lower  "extremity symptoms    • Spinal stenosis of lumbar region 2014   • Wears dentures     upper     Past Surgical History:   Procedure Laterality Date   • BUNIONECTOMY     • CARDIAC CATHETERIZATION      2 STENTS   • CARDIAC SURGERY      one blockage   • CERVICAL SPINE SURGERY     • COLONOSCOPY     • CORONARY ARTERY BYPASS GRAFT      Sees Dr. Scott @ [facility]   • ENDOSCOPY     • EYE SURGERY      with lens   • LUMBAR LAMINECTOMY DISCECTOMY DECOMPRESSION N/A 6/22/2018    Procedure: LUMBAR LAMINECTOMY L2-5;  Surgeon: Jose Cantu MD;  Location: [facility];  Service: Neurosurgery   • SKIN BIOPSY         Therapy Treatment          Rehabilitation Treatment Summary     Row Name 07/01/18 1440             Treatment Time/Intention    Discipline occupational therapist  -      Document Type therapy note (daily note)  -      Subjective Information complains of;fatigue   \"Just don't feel well.\"  -      Patient Effort good  -      Existing Precautions/Restrictions fall;brace worn when out of bed;spinal;other (see comments)   Brace when OOB  -MC      Recorded by [MC] Coco Monahan, OT 07/01/18 1502      Row Name 07/01/18 1440             Cognitive Assessment/Intervention- PT/OT    Affect/Mental Status (Cognitive) WFL  -      Orientation Status (Cognition) oriented x 4  -      Follows Commands (Cognition) follows one step commands;75-90% accuracy  -      Safety Deficit (Cognitive) moderate deficit  -      Personal Safety Interventions fall prevention program maintained;gait belt;muscle strengthening facilitated;nonskid shoes/slippers when out of bed;supervised activity  -      Recorded by [MC] Coco Monahan, OT 07/01/18 1502      Row Name 07/01/18 1440             Bed Mobility Assessment/Treatment    Rolling Right [place] (Bed Mobility) contact guard;verbal cues  -      Scooting/Bridging [place] (Bed Mobility) contact guard  -      Supine-Sit [place] (Bed Mobility) minimum assist (75% patient " effort)  -      Sit-Supine [place] (Bed Mobility) moderate assist (50% patient effort)  -      Bed Mobility, Safety Issues cognitive deficits limit understanding;decreased use of arms for pushing/pulling;decreased use of legs for bridging/pushing;impaired trunk control for bed mobility  -      Assistive Device (Bed Mobility) bed rails;head of bed elevated  -      Comment (Bed Mobility) Pt required cues for sequencing and to adhere to spinal precautions.  He required assist with BLE and trunk for supine  <  > sit  -      Recorded by [] Coco Monahan, OT 07/01/18 1502      Row Name 07/01/18 1440             Functional Mobility    Functional Mobility- Ind. Level minimum assist (75% patient effort)  -      Functional Mobility- Device rolling walker  -      Functional Mobility- Comment Pt took three sidesteps up towards HOB. Declined to walk further  -      Recorded by [MC] Coco Monahan, OT 07/01/18 1502      Row Name 07/01/18 1440             Transfer Assessment/Treatment    Transfer Assessment/Treatment sit-stand transfer;stand-sit transfer  -      Comment (Transfers) Cues for hand placement, safe transfer technique and to adhere to spinal precautions  -      Sit-Stand [place] (Transfers) contact guard;verbal cues  -      Stand-Sit [place] (Transfers) contact guard;verbal cues  -      Recorded by [] Coco Monahan OT 07/01/18 1502      Row Name 07/01/18 1440             Sit-Stand Transfer    Assistive Device (Sit-Stand Transfers) walker, front-wheeled  -      Recorded by [] Coco Monahan OT 07/01/18 1502      Row Name 07/01/18 1440             Stand-Sit Transfer    Assistive Device (Stand-Sit Transfers) walker, front-wheeled  -      Recorded by [] Coco Monahan OT 07/01/18 1502      Row Name 07/01/18 1440             Lower Body Dressing Assessment/Training    Lower Body Dressing [place] Level don;doff;shoes/slippers;moderate assist (50% patient effort)  -MC      Lower  Body Dressing Position edge of bed sitting  -MC      Recorded by [MC] Coco Monahan, OT 07/01/18 1502      Row Name 07/01/18 1440             Therapeutic Exercise    44452 - OT Therapeutic Activity Minutes 12  -MC      Recorded by [MC] Coco Monahan OT 07/01/18 1502      Row Name 07/01/18 1440             Therapeutic Exercise    Upper Extremity Range of Motion (Therapeutic Exercise) --   Refused  -MC      Recorded by [MC] Coco Monahan OT 07/01/18 1502      Row Name 07/01/18 1440             Balance    Balance static sitting balance;static standing balance;dynamic sitting balance;dynamic standing balance  -MC      Recorded by [MC] Coco Monahan OT 07/01/18 1502      Row Name 07/01/18 1440             Static Sitting Balance    Level of [place] (Unsupported Sitting, Static Balance) standby assist  -MC      Sitting Position (Unsupported Sitting, Static Balance) sitting on edge of bed  -MC      Recorded by [MC] Coco Monahan OT 07/01/18 1502      Row Name 07/01/18 1440             Dynamic Sitting Balance    Level of [place], Reaches Outside Midline (Sitting, Dynamic Balance) contact guard assist  -MC      Sitting Position, Reaches Outside Midline (Sitting, Dynamic Balance) sitting on edge of bed  -MC      Recorded by [MC] Coco Monahan OT 07/01/18 1502      Row Name 07/01/18 1440             Static Standing Balance    Level of [place] (Supported Standing, Static Balance) contact guard assist  -MC      Assistive Device Utilized (Supported Standing, Static Balance) rolling walker  -MC      Recorded by [MC] Coco Monahan OT 07/01/18 1502      Row Name 07/01/18 1440             Dynamic Standing Balance    Level of [place], Reaches Outside Midline (Standing, Dynamic Balance) minimal assist, 75% patient effort  -MC      Recorded by [MC] Coco Monahan OT 07/01/18 1502      Row Name 07/01/18 1440             Positioning and Restraints    Pre-Treatment Position in bed  -MC      Post Treatment Position bed  -MC      In  Bed notified nsg;supine;call light within reach;encouraged to call for assist;exit alarm on;side rails up x3  -MC      Recorded by [MC] Coco LURDES Monahan, OT 07/01/18 1502      Row Name 07/01/18 1440             Pain Scale: Numbers Pre/Post-Treatment    Pain Location - Side Right  -MC      Pain Location hip  -MC      Pain Intervention(s) Repositioned  -MC      Recorded by [MC] Coco Monahan, OT 07/01/18 1502      Row Name 07/01/18 1440             Pain Scale: FACES Pre/Post-Treatment    Pain: FACES Scale, Pretreatment 2-->hurts little bit  -MC      Pain: FACES Scale, Post-Treatment 2-->hurts little bit  -MC      Recorded by [MC] Coco Monahan, OT 07/01/18 1502      Row Name                Wound 06/22/18 1347 Other (See comments) back incision    Wound - Properties Group Date first assessed: 06/22/18 [KS] Time first assessed: 1347 [KS] Side: Other (See comments) [KS] Location: back [KS] Type: incision [KS] Recorded by:  [KS] Hemalatha Gomez RN 06/22/18 1347    Row Name                Wound 06/22/18 1347 Other (See comments) back incision    Wound - Properties Group Date first assessed: 06/22/18 [KS] Time first assessed: 1347 [KS] Side: Other (See comments) [KS] Location: back [KS] Type: incision [KS] Recorded by:  [KS] Hemalatha Gomez RN 06/22/18 1347    Row Name 07/01/18 1440             Coping    Observed Emotional State accepting;calm  -      Verbalized Emotional State acceptance  -MC      Recorded by [MC] Coco Monahan, OT 07/01/18 1502      Row Name 07/01/18 1440             Plan of Care Review    Plan of Care Reviewed With patient  -      Recorded by [] Coco Monahan, SUELLEN 07/01/18 1502        User Key  (r) = Recorded By, (t) = Taken By, (c) = Cosigned By    Initials Name Effective Dates Discipline     Coco Monahan OT 03/14/16 -  OT    DEXTER Gomez RN 05/09/17 -  Nurse        Wound 06/22/18 1347 Other (See comments) back incision (Active)   Dressing Appearance dried drainage 7/1/2018 12:00 PM   Closure  LUCILLE 7/1/2018  8:00 AM   Periwound dry;intact 7/1/2018  8:00 AM   Drainage Amount small 7/1/2018  8:00 AM   Dressing Care, Wound low-adherent 7/1/2018 12:00 PM   Periwound Care, Wound dry periwound area maintained 7/1/2018  8:00 AM       Wound 06/22/18 1347 Other (See comments) back incision (Active)   Dressing Appearance dry;intact 6/30/2018  4:38 PM         Occupational Therapy Education     Title: PT OT SLP Therapies (Active)     Topic: Occupational Therapy (Active)     Point: ADL training (Active)     Description: Instruct learner(s) on proper safety adaptation and remediation techniques during self care or transfers.   Instruct in proper use of assistive devices.   Learning Progress Summary     Learner Status Readiness Method Response Comment Documented by    Patient Active Acceptance E NR  MC 07/01/18 1503     Active Acceptance D,E NR log roll, LSO needs, ADLs, transfers, bed mobility, feeding ST 06/28/18 1524     Active Acceptance E,D NR spinal precautions, command following, grooming, HEP, orientation ST 06/26/18 1508     Active Acceptance E,D,TB NR LB dressing ADL retraining with AE of sock aid and reacher, requires reinforcement did demonstrate learning KF 06/25/18 1144     Done Acceptance E,TB,H,D VU,DU role of OT, benefits of activity, spinal precautions, LBD/AE, transfers, rwx use and safety, balance, brace and wear schedule ST 06/23/18 1621          Point: Home exercise program (Active)     Description: Instruct learner(s) on appropriate technique for monitoring, assisting and/or progressing therapeutic exercises/activities.   Learning Progress Summary     Learner Status Readiness Method Response Comment Documented by    Patient Active Acceptance D,E NR log roll, LSO needs, ADLs, transfers, bed mobility, feeding ST 06/28/18 1524     Active Acceptance E,D NR spinal precautions, command following, grooming, HEP, orientation ST 06/26/18 1508     Done Acceptance E,TB,H,D VU,DU role of OT, benefits of  activity, spinal precautions, LBD/AE, transfers, rwx use and safety, balance, brace and wear schedule  06/23/18 1621          Point: Precautions (Active)     Description: Instruct learner(s) on prescribed precautions during self-care and functional transfers.   Learning Progress Summary     Learner Status Readiness Method Response Comment Documented by    Patient Active Acceptance E NR   07/01/18 1503     Active Acceptance D,E NR log roll, LSO needs, ADLs, transfers, bed mobility, feeding  06/28/18 1524     Active Acceptance E,D,TB NR LB dressing ADL retraining with AE of sock aid and reacher, requires reinforcement did demonstrate learning  06/25/18 1144     Done Acceptance E,TB,H,D VU,DU role of OT, benefits of activity, spinal precautions, LBD/AE, transfers, rwx use and safety, balance, brace and wear schedule  06/23/18 1621          Point: Body mechanics (Active)     Description: Instruct learner(s) on proper positioning and spine alignment during self-care, functional mobility activities and/or exercises.   Learning Progress Summary     Learner Status Readiness Method Response Comment Documented by    Patient Active Acceptance E NR   07/01/18 1503     Active Acceptance D,E NR log roll, LSO needs, ADLs, transfers, bed mobility, feeding  06/28/18 1524     Active Acceptance E,D,TB NR LB dressing ADL retraining with AE of sock aid and reacher, requires reinforcement did demonstrate learning  06/25/18 1144     Done Acceptance E,TB,H,D VU,DU role of OT, benefits of activity, spinal precautions, LBD/AE, transfers, rwx use and safety, balance, brace and wear schedule  06/23/18 1621                      User Key     Initials Effective Dates Name Provider Type Discipline     06/10/18 -  Brea Miles, OTR Occupational Therapist OT     03/14/16 -  Coco Monahan, OT Occupational Therapist OT     04/03/18 -  Ashley Atwood, OT Occupational Therapist OT                OT Recommendation and Plan      Plan of Care Review  Plan of Care Reviewed With: patient  Plan of Care Reviewed With: patient  Outcome Summary: Pt demonstrated improved strength and balance this date, particularly with bed mobility.  His confusion is improved as well.   Full progression limited due to fatigue.  Cont OT POC         Outcome Measures     Row Name 07/01/18 1440 06/30/18 1040          How much help from another person do you currently need...    Turning from your back to your side while in flat bed without using bedrails?  -- 2  -JM     Moving from lying on back to sitting on the side of a flat bed without bedrails?  -- 2  -JM     Moving to and from a bed to a chair (including a wheelchair)?  -- 3  -JM     Standing up from a chair using your arms (e.g., wheelchair, bedside chair)?  -- 3  -JM     Climbing 3-5 steps with a railing?  -- 2  -JM     To walk in hospital room?  -- 3  -JM     AM-PAC 6 Clicks Score  -- 15  -JM        How much help from another is currently needed...    Putting on and taking off regular lower body clothing? 2  -MC  --     Bathing (including washing, rinsing, and drying) 2  -MC  --     Toileting (which includes using toilet bed pan or urinal) 2  -MC  --     Putting on and taking off regular upper body clothing 2  -MC  --     Taking care of personal grooming (such as brushing teeth) 2  -MC  --     Eating meals 2  -MC  --     Score 12  -MC  --        Functional Assessment    Outcome Measure Options AM-PAC 6 Clicks Daily Activity (OT)  - AM-[facility] 6 Clicks Basic Mobility (PT)  -       User Key  (r) = Recorded By, (t) = Taken By, (c) = Cosigned By    Initials Name Provider Type    KEVIN Lizama, PT Physical Therapist    AMANDA Monahan, OT Occupational Therapist           Time Calculation:         Time Calculation- OT     Row Name 07/01/18 1504 07/01/18 1440          Time Calculation- OT    OT Start Time  -- 1440  -     Total Timed Code Minutes- OT  -- 12 minute(s)  -     OT Received On 07/01/18  -  --      OT Goal Re-Cert Due Date 07/03/18  -  --        Timed Charges    71992 - OT Therapeutic Activity Minutes  -- 12  -       User Key  (r) = Recorded By, (t) = Taken By, (c) = Cosigned By    Initials Name Provider Type     Coco Monahna OT Occupational Therapist           Therapy Suggested Charges     Code   Minutes Charges    99316 (CPT®) Hc Ot Neuromusc Re Education Ea 15 Min      60931 (CPT®) Hc Ot Ther Proc Ea 15 Min      61057 (CPT®) Hc Gait Training Ea 15 Min      38514 (CPT®) Hc Ot Therapeutic Act Ea 15 Min 12 1    81497 (CPT®) Hc Ot Manual Therapy Ea 15 Min      57892 (CPT®) Hc Ot Iontophoresis Ea 15 Min      34358 (CPT®) Hc Ot Elec Stim Ea-Per 15 Min      69980 (CPT®) Hc Ot Ultrasound Ea 15 Min      60707 (CPT®) Hc Ot Self Care/Mgmt/Train Ea 15 Min      Total  12 1        Therapy Charges for Today     Code Description Service Date Service Provider Modifiers Qty    09402274603 HC OT THERAPEUTIC ACT EA 15 MIN 7/1/2018 Coco Monahan OT GO 1          OT G-codes  OT Professional Judgement Used?: Yes  OT Functional Scales Options: AM-PAC 6 Clicks Daily Activity (OT)  Score: 12  Functional Limitation: Self care  Self Care Current Status (): At least 60 percent but less than 80 percent impaired, limited or restricted  Self Care Goal Status (): At least 40 percent but less than 60 percent impaired, limited or restricted    Coco Monahan OT  7/1/2018

## 2018-07-01 NOTE — PLAN OF CARE
Problem: Patient Care Overview  Goal: Plan of Care Review  Outcome: Ongoing (interventions implemented as appropriate)   07/01/18 5671   Coping/Psychosocial   Plan of Care Reviewed With patient   Plan of Care Review   Progress no change   OTHER   Outcome Summary pt alert but confused at times and hallucinating. Pt continues to try to climb out of the bed despite extensive education. C/O pain, relieved with PRN medications. Rehab soon.

## 2018-07-01 NOTE — PROGRESS NOTES
Subjective: Sensorium much more clear today.    Objective:    Vitals:    18 0828   BP: 151/65   Pulse: 106   Resp:    Temp:    SpO2: 98%     Pulse  Av.5  Min: 73  Max: 106  Systolic (24hrs), Av , Min:151 , Max:201     Diastolic (24hrs), Av, Min:8, Max:91    Temp (24hrs), Av °F (36.7 °C), Min:97.5 °F (36.4 °C), Max:98.8 °F (37.1 °C)      He is sitting in a bedside chair.  He is pleasant, interactive, and appropriate.  His speech is more sensible.  He does not appear to be nearly as confused as he was yesterday.    Lab Results   Component Value Date     2018       A/P:   Anticipate appropriate transfer to rehabilitation tomorrow.

## 2018-07-01 NOTE — PLAN OF CARE
Problem: Patient Care Overview  Goal: Plan of Care Review  Outcome: Ongoing (interventions implemented as appropriate)   07/01/18 1511   Coping/Psychosocial   Plan of Care Reviewed With patient   Plan of Care Review   Progress no change   OTHER   Outcome Summary Pt ambulated 25ft with RW and CGA. Pt required VC's for upright posture and proper management of RW. Pt declined additional ambulation despite encouragement due to c/o increased pain. Pt continues to be confused. Continue to progress pt as appropriate.

## 2018-07-01 NOTE — PLAN OF CARE
Problem: Patient Care Overview  Goal: Plan of Care Review  Outcome: Ongoing (interventions implemented as appropriate)   07/01/18 1503   Coping/Psychosocial   Plan of Care Reviewed With patient   Plan of Care Review   Progress improving   OTHER   Outcome Summary Pt demonstrated improved strength and balance this date, particularly with bed mobility. His confusion is improved as well. Full progression limited due to fatigue. Cont OT POC

## 2018-07-01 NOTE — THERAPY TREATMENT NOTE
"Acute Care - Physical Therapy Treatment Note  [facility]     Patient Name: Sai Main  : 1945  MRN: [id number]  Today's Date: 2018  Onset of Illness/Injury or Date of Surgery: 18  Date of Referral to PT: 18  Referring Physician: MD Pepe    Admit Date: 2018    Visit Dx:    ICD-10-CM ICD-9-CM   1. Impaired functional mobility, balance, gait, and endurance Z74.09 V49.89   2. Impaired mobility and ADLs Z74.09 799.89     Patient Active Problem List   Diagnosis   • Episode of change in speech   • Spinal stenosis of lumbar region with neurogenic claudication   • Radicular pain of both lower extremities   • Chronic bilateral low back pain with bilateral sciatica   • DDD (degenerative disc disease), lumbar   • Spondylosis of lumbar region without myelopathy or radiculopathy   • Congestive heart failure   • IDDM (insulin dependent diabetes mellitus)   • Insomnia   • Physical deconditioning   • Anxiety and depression   • At high risk for falls   • Gait disturbance   • Diabetic polyneuropathy associated with type 1 diabetes mellitus   • Left foot drop   • Moderate obesity   • Spondylosis with myelopathy, lumbar region   • Spinal stenosis, lumbar region, with neurogenic claudication   • Degenerative disc disease, lumbar   • Osteoarthritis of spine with radiculopathy, lumbar region   • Spondylosis   • Impaired functional mobility, balance, gait, and endurance   • Metabolic encephalopathy   • AYE (acute kidney injury)   • Anemia   • Hypotension   • Hypoglycemia       Therapy Treatment          Rehabilitation Treatment Summary     Row Name 18 1511 18 1440          Treatment Time/Intention    Discipline physical therapist  -KR occupational therapist  -     Document Type therapy note (daily note)  -KR therapy note (daily note)  -     Subjective Information complains of;fatigue;pain  -KR complains of;fatigue   \"Just don't feel well.\"  -     Mode of Treatment physical therapy  " -KR  --     Care Plan Review care plan/treatment goals reviewed;risks/benefits reviewed;patient/other agree to care plan  -KR  --     Therapy Frequency (PT Clinical Impression) daily  -KR2  --     Patient Effort good  -KR2 good  -MC     Existing Precautions/Restrictions fall;brace worn when out of bed;spinal   back brace when up  -KR2 fall;brace worn when out of bed;spinal;other (see comments)   Brace when OOB  -MC     Recorded by [KR] Narcisa Cheek, PT 07/01/18 1613  [KR2] Narcisa Cheek, PT 07/01/18 1622 [MC] Coco CHATMAN Hero, OT 07/01/18 1502     Row Name 07/01/18 1511             Vital Signs    Pre Systolic BP Rehab --   RN cleared for treatment; VSS  -KR      Pre Patient Position Supine  -KR      Intra Patient Position Standing  -KR      Post Patient Position Supine  -KR      Recorded by [KR] Narcisa Cheek, PT 07/01/18 1622      Row Name 07/01/18 1511             Cognitive Assessment/Intervention    Additional Documentation Cognitive Assessment/Intervention (Group)  -KR      Recorded by [KR] Narcisa Cheek, PT 07/01/18 1622      Row Name 07/01/18 1511 07/01/18 1440          Cognitive Assessment/Intervention- PT/OT    Affect/Mental Status (Cognitive) confused  -KR WFL  -MC     Orientation Status (Cognition) oriented x 4  -KR oriented x 4  -MC     Follows Commands (Cognition) follows one step commands;50-74% accuracy;repetition of directions required;verbal cues/prompting required  -KR follows one step commands;75-90% accuracy  -MC     Cognitive Function (Cognitive) attention deficit;memory deficit;safety deficit  -KR  --     Attention Deficit (Cognitive) moderate deficit;distractible in quiet environment;focused/sustained attention;requires cues/redirection to task  -KR  --     Memory Deficit (Cognitive) severe deficit;short term memory;long term memory  -KR  --     Safety Deficit (Cognitive) moderate deficit;ability to follow commands;awareness of need for assistance;insight into deficits/self awareness;safety  precautions awareness  -KR moderate deficit  -     Personal Safety Interventions fall prevention program maintained;gait belt;nonskid shoes/slippers when out of bed  -KR fall prevention program maintained;gait belt;muscle strengthening facilitated;nonskid shoes/slippers when out of bed;supervised activity  -     Recorded by [KR] Narcisa Cheek, PT 07/01/18 1622 [MC] Coco Monahan, OT 07/01/18 1502     Row Name 07/01/18 1511             Safety Issues, Functional Mobility    Safety Issues Affecting Function (Mobility) impulsivity;ability to follow commands;awareness of need for assistance;insight into deficits/self awareness;safety precaution awareness  -KR      Impairments Affecting Function (Mobility) balance;cognition;coordination;endurance/activity tolerance;pain;shortness of breath;strength  -KR      Recorded by [KR] Narcisa Cheek, PT 07/01/18 1622      Row Name 07/01/18 1511 07/01/18 1440          Bed Mobility Assessment/Treatment    Bed Mobility Assessment/Treatment supine-sit;sit-supine  -  --     Rolling Right [place] (Bed Mobility)  -- contact guard;verbal cues  -     Scooting/Bridging [place] (Bed Mobility)  -- contact guard  -     Supine-Sit [place] (Bed Mobility) minimum assist (75% patient effort);verbal cues  - minimum assist (75% patient effort)  -     Sit-Supine [place] (Bed Mobility) minimum assist (75% patient effort);verbal cues  -KR moderate assist (50% patient effort)  -     Bed Mobility, Safety Issues cognitive deficits limit understanding;decreased use of arms for pushing/pulling;decreased use of legs for bridging/pushing  - cognitive deficits limit understanding;decreased use of arms for pushing/pulling;decreased use of legs for bridging/pushing;impaired trunk control for bed mobility  -     Assistive Device (Bed Mobility) bed rails;draw sheet;head of bed elevated  - bed rails;head of bed elevated  -     Comment (Bed Mobility) VC's for sequencing and  safety awareness.   -KR Pt required cues for sequencing and to adhere to spinal precautions.  He required assist with BLE and trunk for supine  <  > sit  -MC     Recorded by [KR] Narcisa hCeek, PT 07/01/18 1622 [MC] Coco CHATMAN Hero, OT 07/01/18 1502     Row Name 07/01/18 1440             Functional Mobility    Functional Mobility- Ind. Level minimum assist (75% patient effort)  -MC      Functional Mobility- Device rolling walker  -MC      Functional Mobility- Comment Pt took three sidesteps up towards HOB. Declined to walk further  -MC      Recorded by [MC] Coco CHATMAN Hero, OT 07/01/18 1502      Row Name 07/01/18 1511 07/01/18 1440          Transfer Assessment/Treatment    Transfer Assessment/Treatment sit-stand transfer;stand-sit transfer;toilet transfer  -KR sit-stand transfer;stand-sit transfer  -MC     Comment (Transfers) VC's for sequencing and hand placement. Back brace donned prior to transfer.   -KR Cues for hand placement, safe transfer technique and to adhere to spinal precautions  -MC     Sit-Stand [place] (Transfers) contact guard;verbal cues  -KR contact guard;verbal cues  -MC     Stand-Sit [place] (Transfers) contact guard;verbal cues  -KR contact guard;verbal cues  -MC     [place] Level (Toilet Transfer) contact guard;verbal cues  -KR  --     Assistive Device (Toilet Transfer) raised toilet seat;grab bars/safety frame;walker, front-wheeled  -KR  --     Recorded by [KR] Narcisa Cheek, PT 07/01/18 1622 [MC] Coco CHATMAN Hero, OT 07/01/18 1502     Row Name 07/01/18 1511 07/01/18 1440          Sit-Stand Transfer    Assistive Device (Sit-Stand Transfers) walker, front-wheeled  -KR walker, front-wheeled  -MC     Recorded by [KR] Narcisa Cheek, PT 07/01/18 1622 [MC] Coco CHATMAN Hero, OT 07/01/18 1502     Row Name 07/01/18 1511 07/01/18 1440          Stand-Sit Transfer    Assistive Device (Stand-Sit Transfers) walker, front-wheeled  -KR walker, front-wheeled  -MC     Recorded by [KR] Narcisa Cheek, PT  07/01/18 1622 [MC] Coco Monahan, OT 07/01/18 1502     Row Name 07/01/18 1511             Toilet Transfer    Type (Toilet Transfer) sit-stand;stand-sit  -KR      Recorded by [KR] Narcisa Cheek, PT 07/01/18 1622      Row Name 07/01/18 1511             Gait/Stairs Assessment/Training    Gait/Stairs Assessment/Training gait/ambulation assistive device  -KR      [place] Level (Gait) contact guard;verbal cues  -KR      Assistive Device (Gait) walker, front-wheeled  -KR      Distance in Feet (Gait) 25  -KR      Pattern (Gait) step-through  -KR      Deviations/Abnormal Patterns (Gait) antalgic;base of support, wide;jackie decreased;other (see comments)   decreased step length  -KR      Bilateral Gait Deviations forward flexed posture;heel strike decreased  -KR      Comment (Gait/Stairs) Pt demonstrated step through gait pattern with slow jackie and forward flexed posture. Pt required frequent cues to keep RW close with feet inside middle. Pt declined additional ambulation due to increased back pain.   -KR      Recorded by [KR] Narcisa Cheek, PT 07/01/18 1622      Row Name 07/01/18 1440             Lower Body Dressing Assessment/Training    Lower Body Dressing [place] Level don;doff;shoes/slippers;moderate assist (50% patient effort)  -      Lower Body Dressing Position edge of bed sitting  -MC      Recorded by [MC] Coco Monahan, OT 07/01/18 1502      Row Name 07/01/18 1511             Motor Skills Assessment/Interventions    Additional Documentation Balance (Group)  -KR      Recorded by [KR] Narcisa Cheek, PT 07/01/18 1622      Row Name 07/01/18 1511 07/01/18 1440          Therapeutic Exercise    23635 - PT Therapeutic Activity Minutes 25  -KR  --     83111 - OT Therapeutic Activity Minutes  -- 12  -MC     Recorded by [KR] Narcisa Cheek, PT 07/01/18 1622 [MC] Coco Monahan, OT 07/01/18 1502     Row Name 07/01/18 1511 07/01/18 1440          Therapeutic Exercise    Upper Extremity Range of Motion (Therapeutic  Exercise)  -- --   Refused  -MC     Lower Extremity Range of Motion (Therapeutic Exercise) hip internal/external rotation, bilateral;ankle dorsiflexion/plantar flexion, bilateral  -KR  --     Exercise Type (Therapeutic Exercise) AAROM (active assistive range of motion)  -KR  --     Position (Therapeutic Exercise) supine  -KR  --     Sets/Reps (Therapeutic Exercise) 10x each  -KR  --     Comment (Therapeutic Exercise) pt refused additional ther ex  -KR  --     Recorded by [KR] Narcisa Cheek, PT 07/01/18 1622 [MC] Coco Monahan, OT 07/01/18 1502     Row Name 07/01/18 1511 07/01/18 1440          Balance    Balance static sitting balance;static standing balance  -KR static sitting balance;static standing balance;dynamic sitting balance;dynamic standing balance  -     Recorded by [KR] Narcisa Cheek, PT 07/01/18 1622 [MC] Coco Monahan, OT 07/01/18 1502     Row Name 07/01/18 1511 07/01/18 1440          Static Sitting Balance    Level of [place] (Unsupported Sitting, Static Balance) contact guard assist  -KR standby assist  -MC     Sitting Position (Unsupported Sitting, Static Balance) sitting on edge of bed  -KR sitting on edge of bed  -MC     Recorded by [KR] Narcisa Cheek, PT 07/01/18 1622 [MC] Coco Monahan, OT 07/01/18 1502     Row Name 07/01/18 1440             Dynamic Sitting Balance    Level of [place], Reaches Outside Midline (Sitting, Dynamic Balance) contact guard assist  -MC      Sitting Position, Reaches Outside Midline (Sitting, Dynamic Balance) sitting on edge of bed  -MC      Recorded by [MC] Coco Monahan, OT 07/01/18 1502      Row Name 07/01/18 1511 07/01/18 1440          Static Standing Balance    Level of [place] (Supported Standing, Static Balance) contact guard assist  -KR contact guard assist  -MC     Assistive Device Utilized (Supported Standing, Static Balance) rolling walker  -KR rolling walker  -MC     Recorded by [KR] Narcisa Cheek, PT 07/01/18 1622 [MC] Coco Monahan, OT 07/01/18  1502     Row Name 07/01/18 1440             Dynamic Standing Balance    Level of [place], Reaches Outside Midline (Standing, Dynamic Balance) minimal assist, 75% patient effort  -MC      Recorded by [MC] Coco Monahan, OT 07/01/18 1502      Row Name 07/01/18 1511 07/01/18 1440          Positioning and Restraints    Pre-Treatment Position in bed  -KR in bed  -MC     Post Treatment Position bed  -KR bed  -MC     In Bed notified nsg;supine;call light within reach;encouraged to call for assist;exit alarm on;side rails up x3  -KR notified nsg;supine;call light within reach;encouraged to call for assist;exit alarm on;side rails up x3  -MC     Recorded by [KR] Narcisa Cheek, PT 07/01/18 1622 [MC] Coco Monahan, OT 07/01/18 1502     Row Name 07/01/18 1511             Pain Assessment    Additional Documentation Pain Scale: Numbers Pre/Post-Treatment (Group)  -KR      Recorded by [KR] Narcisa Cheek, PT 07/01/18 1622      Row Name 07/01/18 1511 07/01/18 1440          Pain Scale: Numbers Pre/Post-Treatment    Pain Scale: Numbers, Pretreatment 8/10  -KR  --     Pain Scale: Numbers, Post-Treatment 8/10  -KR  --     Pain Location - Side Right  -KR Right  -MC     Pain Location hip  -KR hip  -MC     Pain Intervention(s) Repositioned;Ambulation/increased activity  -KR Repositioned  -MC     Recorded by [KR] Narcisa Cheek, PT 07/01/18 1622 [MC] Coco Monahan, OT 07/01/18 1502     Row Name 07/01/18 1440             Pain Scale: FACES Pre/Post-Treatment    Pain: FACES Scale, Pretreatment 2-->hurts little bit  -MC      Pain: FACES Scale, Post-Treatment 2-->hurts little bit  -MC      Recorded by [MC] Coco Monahan, OT 07/01/18 1502      Row Name                Wound 06/22/18 1347 Other (See comments) back incision    Wound - Properties Group Date first assessed: 06/22/18 [KS] Time first assessed: 1347 [KS] Side: Other (See comments) [KS] Location: back [KS] Type: incision [KS] Recorded by:  [KS] Hemalatha Gomez RN 06/22/18 4788    Row  Name                Wound 06/22/18 1347 Other (See comments) back incision    Wound - Properties Group Date first assessed: 06/22/18 [KS] Time first assessed: 1347 [KS] Side: Other (See comments) [KS] Location: back [KS] Type: incision [KS] Recorded by:  [KS] Hemalatha Gomez RN 06/22/18 1347    Row Name 07/01/18 1440             Coping    Observed Emotional State accepting;calm  -MC      Verbalized Emotional State acceptance  -MC      Recorded by [MC] Coco Monahan, OT 07/01/18 1502      Row Name 07/01/18 1511 07/01/18 1440          Plan of Care Review    Plan of Care Reviewed With patient  -KR patient  -MC     Recorded by [KR] Narcisa Cheek, PT 07/01/18 1622 [MC] Coco Monahan, OT 07/01/18 1502     Row Name 07/01/18 1511             Outcome Summary/Treatment Plan (PT)    Daily Summary of Progress (PT) progress toward functional goals is gradual  -KR      Recorded by [KR] Narcisa Cheek, PT 07/01/18 1622        User Key  (r) = Recorded By, (t) = Taken By, (c) = Cosigned By    Initials Name Effective Dates Discipline     Coco Monahan, OT 03/14/16 -  OT    DEXTER Gomez RN 05/09/17 -  Nurse    DIEGO Cheek, PT 04/03/18 -  PT          Wound 06/22/18 1347 Other (See comments) back incision (Active)   Dressing Appearance dried drainage 7/1/2018 12:00 PM   Closure LUCILLE 7/1/2018  8:00 AM   Periwound dry;intact 7/1/2018  8:00 AM   Drainage Amount small 7/1/2018  8:00 AM   Dressing Care, Wound low-adherent 7/1/2018 12:00 PM   Periwound Care, Wound dry periwound area maintained 7/1/2018  8:00 AM       Wound 06/22/18 1347 Other (See comments) back incision (Active)   Dressing Appearance dry;intact 6/30/2018  4:38 PM             Physical Therapy Education     Title: PT OT SLP Therapies (Active)     Topic: Physical Therapy (Active)     Point: Mobility training (Active)    Learning Progress Summary     Learner Status Readiness Method Response Comment Documented by    Patient Active Acceptance E NR  KR 07/01/18 6710      Done Acceptance E TITUS PETERS   06/30/18 1112     Active Acceptance E NR Educated on proper mechanics for safe functional mobility. Needs reinforcement d/t cognition.  06/28/18 1406     Active Acceptance ED NR Reviewed back precautions, safety with mobility/transfers, HEP.  06/27/18 1557     Active Acceptance E NR,NL BACK PRECAUTIONS, IMPORTANCE OF HAVING BRACE UP AT ALL TIMES WHEN OOB.  06/26/18 1109     Done Acceptance E,D LORRAINENR Reviewed benefits of activity, spinal precautions, log roll technique, correct gait mechanics, HEP.  06/25/18 0906     Done Acceptance E,D LORRAINENR Reviewed spinal precautions, correct gait mechanics, HEP, benefits of activity.  06/24/18 1352     Active Acceptance E,D,H NR Reviewed spinal precautions, log roll technique, performing ankle pumps throughout the day, correct gait mechanics, safety with mobility and transfers, wear schedule for lumbar brace.  06/23/18 1457    Significant Other Done Acceptance ED TITUS PETERS Reviewed spinal precautions, correct gait mechanics, HEP, benefits of activity.  06/24/18 1352     Active Acceptance E,D,H NR Reviewed spinal precautions, log roll technique, performing ankle pumps throughout the day, correct gait mechanics, safety with mobility and transfers, wear schedule for lumbar brace.  06/23/18 1457          Point: Home exercise program (Active)    Learning Progress Summary     Learner Status Readiness Method Response Comment Documented by    Patient Active Acceptance E TITUS   07/01/18 1623     Done Acceptance TITUS BONDS   06/30/18 1112     Active Acceptance E NR Educated on proper mechanics for safe functional mobility. Needs reinforcement d/t cognition.  06/28/18 1406     Active Acceptance ED NR Reviewed back precautions, safety with mobility/transfers, HEP.  06/27/18 1557     Active Acceptance E NR,NL BACK PRECAUTIONS, IMPORTANCE OF HAVING BRACE UP AT ALL TIMES WHEN OOB.  06/26/18 1109     Done Acceptance ED TITUS PETERS Reviewed benefits  of activity, spinal precautions, log roll technique, correct gait mechanics, HEP.  06/25/18 0906     Done Acceptance E,D LORRAINENR Reviewed spinal precautions, correct gait mechanics, HEP, benefits of activity.  06/24/18 1352     Active Acceptance E,D,H NR Reviewed spinal precautions, log roll technique, performing ankle pumps throughout the day, correct gait mechanics, safety with mobility and transfers, wear schedule for lumbar brace.  06/23/18 1457    Significant Other Done Acceptance E,D LORRAINE,NR Reviewed spinal precautions, correct gait mechanics, HEP, benefits of activity.  06/24/18 1352     Active Acceptance E,D,H NR Reviewed spinal precautions, log roll technique, performing ankle pumps throughout the day, correct gait mechanics, safety with mobility and transfers, wear schedule for lumbar brace.  06/23/18 9107          Point: Body mechanics (Active)    Learning Progress Summary     Learner Status Readiness Method Response Comment Documented by    Patient Active Acceptance E TITUS  KR 07/01/18 1623     Done Acceptance TITUS BONDS   06/30/18 1112     Active Acceptance E NR Educated on proper mechanics for safe functional mobility. Needs reinforcement d/t cognition. VG 06/28/18 1406     Active Acceptance E,D NR Reviewed back precautions, safety with mobility/transfers, HEP.  06/27/18 1557     Active Acceptance E TITUS,NL BACK PRECAUTIONS, IMPORTANCE OF HAVING BRACE UP AT ALL TIMES WHEN OOB. CD 06/26/18 1109     Done Acceptance E,D TITUS PETERS Reviewed benefits of activity, spinal precautions, log roll technique, correct gait mechanics, HEP.  06/25/18 0906     Done Acceptance ED TITUS PETERS Reviewed spinal precautions, correct gait mechanics, HEP, benefits of activity.  06/24/18 1352     Active Acceptance E,D,H NR Reviewed spinal precautions, log roll technique, performing ankle pumps throughout the day, correct gait mechanics, safety with mobility and transfers, wear schedule for lumbar brace.  06/23/18 0938     Significant Other Done Acceptance E,D LORRAINENR Reviewed spinal precautions, correct gait mechanics, HEP, benefits of activity.  06/24/18 1352     Active Acceptance E,D,H NR Reviewed spinal precautions, log roll technique, performing ankle pumps throughout the day, correct gait mechanics, safety with mobility and transfers, wear schedule for lumbar brace.  06/23/18 1457          Point: Precautions (Active)    Learning Progress Summary     Learner Status Readiness Method Response Comment Documented by    Patient Active Acceptance E NR  KR 07/01/18 1623     Done Acceptance E TITUS PETERS   06/30/18 1112     Active Acceptance E NR Educated on proper mechanics for safe functional mobility. Needs reinforcement d/t cognition. VG 06/28/18 1406     Active Acceptance E,D NR Reviewed back precautions, safety with mobility/transfers, HEP.  06/27/18 1557     Active Acceptance E NR,NL BACK PRECAUTIONS, IMPORTANCE OF HAVING BRACE UP AT ALL TIMES WHEN OOB. CD 06/26/18 1109     Done Acceptance ED TITUS PETERS Reviewed benefits of activity, spinal precautions, log roll technique, correct gait mechanics, HEP.  06/25/18 0906     Done Acceptance E,D LORRAINENR Reviewed spinal precautions, correct gait mechanics, HEP, benefits of activity.  06/24/18 1352     Active Acceptance E,D,H NR Reviewed spinal precautions, log roll technique, performing ankle pumps throughout the day, correct gait mechanics, safety with mobility and transfers, wear schedule for lumbar brace.  06/23/18 1457    Significant Other Done Acceptance E,D TITUS PETERS Reviewed spinal precautions, correct gait mechanics, HEP, benefits of activity.  06/24/18 1352     Active Acceptance E,D,H NR Reviewed spinal precautions, log roll technique, performing ankle pumps throughout the day, correct gait mechanics, safety with mobility and transfers, wear schedule for lumbar brace.  06/23/18 1457                      User Key     Initials Effective Dates Name Provider Type Discipline    CD  06/19/15 -  Stephanie Boyd, PT Physical Therapist PT    JM 06/19/15 -  Chad Lizama, PT Physical Therapist PT    KR 04/03/18 -  Narcisa Cheek, PT Physical Therapist PT    LM 04/03/18 -  Kathleen Barrios, PT Physical Therapist PT    VG 05/29/18 -  Margi Lauren, PT Physical Therapist PT                    PT Recommendation and Plan  Therapy Frequency (PT Clinical Impression): daily  Outcome Summary/Treatment Plan (PT)  Daily Summary of Progress (PT): progress toward functional goals is gradual  Plan of Care Reviewed With: patient  Progress: no change  Outcome Summary: Pt ambulated 25ft with RW and CGA. Pt required VC's for upright posture and proper management of RW. Pt declined additional ambulation despite encouragement due to c/o increased pain. Pt continues to be confused. Continue to progress pt as appropriate.           Outcome Measures     Row Name 07/01/18 1511 07/01/18 1440 06/30/18 1040       How much help from another person do you currently need...    Turning from your back to your side while in flat bed without using bedrails? 3  -KR  -- 2  -JM    Moving from lying on back to sitting on the side of a flat bed without bedrails? 3  -KR  -- 2  -JM    Moving to and from a bed to a chair (including a wheelchair)? 3  -KR  -- 3  -JM    Standing up from a chair using your arms (e.g., wheelchair, bedside chair)? 3  -KR  -- 3  -JM    Climbing 3-5 steps with a railing? 2  -KR  -- 2  -JM    To walk in hospital room? 3  -KR  -- 3  -JM    AM-PAC 6 Clicks Score 17  -KR  -- 15  -JM       How much help from another is currently needed...    Putting on and taking off regular lower body clothing?  -- 2  -MC  --    Bathing (including washing, rinsing, and drying)  -- 2  -MC  --    Toileting (which includes using toilet bed pan or urinal)  -- 2  -MC  --    Putting on and taking off regular upper body clothing  -- 2  -MC  --    Taking care of personal grooming (such as brushing teeth)  -- 2  -MC  --    Eating meals  -- 2   -  --    Score  -- 12  -  --       Functional Assessment    Outcome Measure Options AM-PAC 6 Clicks Basic Mobility (PT)  -KR AM-PAC 6 Clicks Daily Activity (OT)  - AM-PAC 6 Clicks Basic Mobility (PT)  -      User Key  (r) = Recorded By, (t) = Taken By, (c) = Cosigned By    Initials Name Provider Type    KEVIN Lizama, PT Physical Therapist    AMANDA Monahan, OT Occupational Therapist    DIEGO Cheek, PT Physical Therapist           Time Calculation:         PT Charges     Row Name 07/01/18 1511             Time Calculation    Start Time 1511  -KR      PT Received On 07/01/18  -KR      PT Goal Re-Cert Due Date 07/03/18  -KR         Time Calculation- PT    Total Timed Code Minutes- PT 25 minute(s)  -KR         Timed Charges    62411 - PT Therapeutic Activity Minutes 25  -KR        User Key  (r) = Recorded By, (t) = Taken By, (c) = Cosigned By    Initials Name Provider Type    KR Narcisa Cheek, PT Physical Therapist        Therapy Suggested Charges     Code   Minutes Charges    32448 (CPT®) Hc Pt Neuromusc Re Education Ea 15 Min      79748 (CPT®) Hc Pt Ther Proc Ea 15 Min      12098 (CPT®) Hc Gait Training Ea 15 Min      50217 (CPT®) Hc Pt Therapeutic Act Ea 15 Min 25 2    03667 (CPT®) Hc Pt Manual Therapy Ea 15 Min      59550 (CPT®) Hc Pt Iontophoresis Ea 15 Min      52041 (CPT®) Hc Pt Elec Stim Ea-Per 15 Min      38640 (CPT®) Hc Pt Ultrasound Ea 15 Min      10179 (CPT®) Hc Pt Self Care/Mgmt/Train Ea 15 Min      Total  25 2        Therapy Charges for Today     Code Description Service Date Service Provider Modifiers Qty    78110350543 HC PT THERAPEUTIC ACT EA 15 MIN 7/1/2018 Narcisa Cheek, PT GP 2    27972370684 HC PT THER SUPP EA 15 MIN 7/1/2018 Narcisa Cheek, PT GP 3          PT G-Codes  PT Professional Judgement Used?: Yes  Outcome Measure Options: AM-PAC 6 Clicks Basic Mobility (PT)  Score: 14  Functional Limitation: Mobility: Walking and moving around  Mobility: Walking and Moving Around  Current Status (): At least 40 percent but less than 60 percent impaired, limited or restricted  Mobility: Walking and Moving Around Goal Status (): At least 1 percent but less than 20 percent impaired, limited or restricted    Lona Cheek, PT  7/1/2018

## 2018-07-02 LAB
GLUCOSE BLDC GLUCOMTR-MCNC: 163 MG/DL (ref 70–130)
GLUCOSE BLDC GLUCOMTR-MCNC: 190 MG/DL (ref 70–130)
GLUCOSE BLDC GLUCOMTR-MCNC: 196 MG/DL (ref 70–130)
GLUCOSE BLDC GLUCOMTR-MCNC: 221 MG/DL (ref 70–130)
INTERPRETATION UR IFE-IMP: NORMAL

## 2018-07-02 PROCEDURE — 63710000001 FLINTSTONES COMPLETE 60 MG CHEWABLE TABLET: Performed by: NEUROLOGICAL SURGERY

## 2018-07-02 PROCEDURE — 97116 GAIT TRAINING THERAPY: CPT

## 2018-07-02 PROCEDURE — 99024 POSTOP FOLLOW-UP VISIT: CPT | Performed by: NEUROLOGICAL SURGERY

## 2018-07-02 PROCEDURE — 97110 THERAPEUTIC EXERCISES: CPT

## 2018-07-02 PROCEDURE — 82962 GLUCOSE BLOOD TEST: CPT

## 2018-07-02 PROCEDURE — 63710000001 INSULIN DETEMIR PER 5 UNITS: Performed by: NURSE PRACTITIONER

## 2018-07-02 PROCEDURE — 94799 UNLISTED PULMONARY SVC/PX: CPT

## 2018-07-02 PROCEDURE — 99024 POSTOP FOLLOW-UP VISIT: CPT | Performed by: PHYSICIAN ASSISTANT

## 2018-07-02 PROCEDURE — 97535 SELF CARE MNGMENT TRAINING: CPT | Performed by: OCCUPATIONAL THERAPIST

## 2018-07-02 PROCEDURE — 97530 THERAPEUTIC ACTIVITIES: CPT | Performed by: OCCUPATIONAL THERAPIST

## 2018-07-02 PROCEDURE — 99233 SBSQ HOSP IP/OBS HIGH 50: CPT | Performed by: NURSE PRACTITIONER

## 2018-07-02 RX ORDER — LISINOPRIL 40 MG/1
40 TABLET ORAL DAILY
Status: DISCONTINUED | OUTPATIENT
Start: 2018-07-03 | End: 2018-07-05 | Stop reason: HOSPADM

## 2018-07-02 RX ORDER — LISINOPRIL 20 MG/1
20 TABLET ORAL ONCE
Status: COMPLETED | OUTPATIENT
Start: 2018-07-02 | End: 2018-07-02

## 2018-07-02 RX ADMIN — GABAPENTIN 400 MG: 400 CAPSULE ORAL at 20:44

## 2018-07-02 RX ADMIN — PANTOPRAZOLE SODIUM 40 MG: 40 TABLET, DELAYED RELEASE ORAL at 08:02

## 2018-07-02 RX ADMIN — INSULIN DETEMIR 3 UNITS: 100 INJECTION, SOLUTION SUBCUTANEOUS at 11:56

## 2018-07-02 RX ADMIN — OXYCODONE HYDROCHLORIDE AND ACETAMINOPHEN 1 TABLET: 5; 325 TABLET ORAL at 04:48

## 2018-07-02 RX ADMIN — OXYCODONE HYDROCHLORIDE AND ACETAMINOPHEN 1 TABLET: 5; 325 TABLET ORAL at 15:50

## 2018-07-02 RX ADMIN — LISINOPRIL 20 MG: 20 TABLET ORAL at 10:18

## 2018-07-02 RX ADMIN — Medication 1 TABLET: at 08:02

## 2018-07-02 RX ADMIN — FINASTERIDE 5 MG: 5 TABLET, FILM COATED ORAL at 08:00

## 2018-07-02 RX ADMIN — FAMOTIDINE 20 MG: 20 TABLET ORAL at 20:43

## 2018-07-02 RX ADMIN — LISINOPRIL 20 MG: 20 TABLET ORAL at 08:02

## 2018-07-02 RX ADMIN — SERTRALINE HYDROCHLORIDE 150 MG: 100 TABLET ORAL at 20:44

## 2018-07-02 RX ADMIN — HALOPERIDOL 5 MG: 5 TABLET ORAL at 13:15

## 2018-07-02 RX ADMIN — INSULIN LISPRO 2 UNITS: 100 INJECTION, SOLUTION INTRAVENOUS; SUBCUTANEOUS at 07:59

## 2018-07-02 RX ADMIN — SENNOSIDES AND DOCUSATE SODIUM 1 TABLET: 8.6; 5 TABLET ORAL at 20:44

## 2018-07-02 RX ADMIN — HALOPERIDOL 5 MG: 5 TABLET ORAL at 04:48

## 2018-07-02 RX ADMIN — GABAPENTIN 300 MG: 300 CAPSULE ORAL at 08:01

## 2018-07-02 RX ADMIN — OXYCODONE HYDROCHLORIDE AND ACETAMINOPHEN 1 TABLET: 5; 325 TABLET ORAL at 21:49

## 2018-07-02 RX ADMIN — HALOPERIDOL 5 MG: 5 TABLET ORAL at 20:42

## 2018-07-02 RX ADMIN — OXYBUTYNIN CHLORIDE 5 MG: 5 TABLET ORAL at 20:44

## 2018-07-02 RX ADMIN — ACETAMINOPHEN 650 MG: 325 TABLET ORAL at 07:13

## 2018-07-02 RX ADMIN — BISACODYL 10 MG: 5 TABLET, COATED ORAL at 08:00

## 2018-07-02 RX ADMIN — IPRATROPIUM BROMIDE 2 SPRAY: 42 SPRAY NASAL at 13:12

## 2018-07-02 RX ADMIN — SENNOSIDES AND DOCUSATE SODIUM 1 TABLET: 8.6; 5 TABLET ORAL at 08:00

## 2018-07-02 RX ADMIN — LABETALOL HYDROCHLORIDE 10 MG: 5 INJECTION INTRAVENOUS at 04:56

## 2018-07-02 RX ADMIN — ATORVASTATIN CALCIUM 10 MG: 10 TABLET, FILM COATED ORAL at 08:02

## 2018-07-02 RX ADMIN — OXYCODONE HYDROCHLORIDE AND ACETAMINOPHEN 1 TABLET: 5; 325 TABLET ORAL at 10:18

## 2018-07-02 RX ADMIN — INSULIN DETEMIR 5 UNITS: 100 INJECTION, SOLUTION SUBCUTANEOUS at 07:56

## 2018-07-02 RX ADMIN — FUROSEMIDE 20 MG: 40 TABLET ORAL at 08:01

## 2018-07-02 RX ADMIN — SERTRALINE HYDROCHLORIDE 100 MG: 100 TABLET ORAL at 08:01

## 2018-07-02 RX ADMIN — CARVEDILOL 3.12 MG: 3.12 TABLET, FILM COATED ORAL at 17:48

## 2018-07-02 RX ADMIN — TAMSULOSIN HYDROCHLORIDE 0.4 MG: 0.4 CAPSULE ORAL at 08:02

## 2018-07-02 RX ADMIN — INSULIN LISPRO 2 UNITS: 100 INJECTION, SOLUTION INTRAVENOUS; SUBCUTANEOUS at 17:48

## 2018-07-02 RX ADMIN — INSULIN LISPRO 3 UNITS: 100 INJECTION, SOLUTION INTRAVENOUS; SUBCUTANEOUS at 11:56

## 2018-07-02 RX ADMIN — LABETALOL HYDROCHLORIDE 10 MG: 5 INJECTION INTRAVENOUS at 00:30

## 2018-07-02 RX ADMIN — CARVEDILOL 3.12 MG: 3.12 TABLET, FILM COATED ORAL at 07:58

## 2018-07-02 RX ADMIN — FAMOTIDINE 20 MG: 20 TABLET ORAL at 08:02

## 2018-07-02 NOTE — PLAN OF CARE
Problem: Patient Care Overview  Goal: Plan of Care Review  Outcome: Ongoing (interventions implemented as appropriate)   07/02/18 0977   Coping/Psychosocial   Plan of Care Reviewed With patient   Plan of Care Review   Progress improving   OTHER   Outcome Summary Patient ambulated 140 feet with RW, limited by pain and weakness. CGA for all OOB mobility. Will continue to progress mobility training and strengthening as able.

## 2018-07-02 NOTE — PLAN OF CARE
Problem: Patient Care Overview  Goal: Plan of Care Review   07/02/18 0345   Coping/Psychosocial   Plan of Care Reviewed With patient   Plan of Care Review   Progress improving   OTHER   Outcome Summary Pt alert and oriented, with slight disorientation to situation. Pt was polite, and nice to staff. Pt saying please and thank you. Pt did not request pain meds during shift.        Problem: Fall Risk (Adult)  Goal: Identify Related Risk Factors and Signs and Symptoms  Outcome: Ongoing (interventions implemented as appropriate)      Problem: Laminectomy/Foraminotomy/Discectomy (Adult)  Goal: Signs and Symptoms of Listed Potential Problems Will be Absent, Minimized or Managed (Laminectomy/Foraminotomy/Discectomy)  Outcome: Ongoing (interventions implemented as appropriate)      Problem: Skin Injury Risk (Adult)  Goal: Identify Related Risk Factors and Signs and Symptoms  Outcome: Ongoing (interventions implemented as appropriate)    Goal: Skin Health and Integrity  Outcome: Ongoing (interventions implemented as appropriate)

## 2018-07-02 NOTE — PROGRESS NOTES
"NEUROSURGERY PROGRESS NOTE    Vital Signs  Blood pressure (!) 196/86, pulse 76, temperature 97 °F (36.1 °C), temperature source Temporal Artery , resp. rate 16, height 182.9 cm (72.01\"), weight 117 kg (257 lb 15 oz), SpO2 97 %.    Interval History:   Patient had just returned from physical therapy. Per nursing staff has been appropriate for the past 24 hours. Did receive Haldol last night.     Patient still slightly confused. Alert and oriented x2. Was able to tell location with prompts. Was not oriented to time.    ASSESSMENT: POD#10  LUMBAR LAMINECTOMY L2-5    PLAN:   - Case management consult order for dispo planning. Per rehab patient must be without sitter for 24 hours. Last had sitter on Saturday. Will attempt placement today for discharge either this PM or tomorrow AM if bed available.   - Continue delirium interventions - open blinds, lights on during day hours, TV on, etc   - Elevated BP - lisinopril has been increased to 40mg daily per hospitalist will continue to monitor     Coco Solis PA-C  07/02/18  10:04 AM    "

## 2018-07-02 NOTE — THERAPY TREATMENT NOTE
Acute Care - Physical Therapy Treatment Note   [place]     Patient Name: Sai Main  : 1945  MRN: [id number]  Today's Date: 2018  Onset of Illness/Injury or Date of Surgery: 18  Date of Referral to PT: 18  Referring Physician: MD Pepe    Admit Date: 2018    Visit Dx:    ICD-10-CM ICD-9-CM   1. Impaired functional mobility, balance, gait, and endurance Z74.09 V49.89   2. Impaired mobility and ADLs Z74.09 799.89     Patient Active Problem List   Diagnosis   • Episode of change in speech   • Spinal stenosis of lumbar region with neurogenic claudication   • Radicular pain of both lower extremities   • Chronic bilateral low back pain with bilateral sciatica   • DDD (degenerative disc disease), lumbar   • Spondylosis of lumbar region without myelopathy or radiculopathy   • Congestive heart failure   • IDDM (insulin dependent diabetes mellitus)   • Insomnia   • Physical deconditioning   • Anxiety and depression   • At high risk for falls   • Gait disturbance   • Diabetic polyneuropathy associated with type 1 diabetes mellitus   • Left foot drop   • Moderate obesity   • Spondylosis with myelopathy, lumbar region   • Spinal stenosis, lumbar region, with neurogenic claudication   • Degenerative disc disease, lumbar   • Osteoarthritis of spine with radiculopathy, lumbar region   • Spondylosis   • Impaired functional mobility, balance, gait, and endurance   • Metabolic encephalopathy   • AYE (acute kidney injury)   • Anemia   • Hypotension   • Hypoglycemia       Therapy Treatment          Rehabilitation Treatment Summary     Row Name 18 0920             Treatment Time/Intention    Discipline physical therapist  -LR      Document Type therapy note (daily note)   returned later for ther ex and to assist patient back to bed  -LR2      Subjective Information no complaints   denies pain at rest  -LR      Mode of Treatment physical therapy;individual therapy  -LR      Patient/Family  Observations Patient sitting reclined [facility] on arrival.   -LR      Care Plan Review care plan/treatment goals reviewed;risks/benefits reviewed;current/potential barriers reviewed;patient/other agree to care plan  -LR      Patient Effort good  -LR      Existing Precautions/Restrictions fall;spinal;brace worn when out of bed;other (see comments)   back brace on when up OOB  -LR      Recorded by [LR] Adrianne Fernandez, PT 07/02/18 0951  [LR2] Adrianne Fernandez, PT 07/02/18 1016      Row Name 07/02/18 0920             Cognitive Assessment/Intervention- PT/OT    Affect/Mental Status (Cognitive) WFL  -LR      Orientation Status (Cognition) oriented x 4  -LR      Follows Commands (Cognition) follows one step commands;75-90% accuracy;verbal cues/prompting required;repetition of directions required  -LR      Safety Deficit (Cognitive) mild deficit;insight into deficits/self awareness;problem solving;safety precautions awareness;safety precautions follow-through/compliance  -LR      Recorded by [LR] Adrianne Fernandez, PT 07/02/18 0951      Row Name 07/02/18 0920             Safety Issues, Functional Mobility    Safety Issues Affecting Function (Mobility) insight into deficits/self awareness;positioning of assistive device;problem solving;sequencing abilities;safety precautions follow-through/compliance;safety precaution awareness  -LR      Recorded by [LR] Adrianne Fernandez, PT 07/02/18 0951      Row Name 07/02/18 0920             Bed Mobility Assessment/Treatment    Supine-Sit [place] (Bed Mobility) not tested   UI on arrival.   -LR      Sit-Supine [place] (Bed Mobility) verbal cues;moderate assist (50% patient effort)  -LR2      Bed Mobility, Safety Issues decreased use of legs for bridging/pushing  -LR2      Assistive Device (Bed Mobility) head of bed elevated;bed rails  -LR2      Comment (Bed Mobility) Verbal cues for correct log rolling technique. Mod assist to lift LEs up into bed.   -LR2       Recorded by [LR] Adrianne Fernandez, PT 07/02/18 0951  [LR2] Adrianne Fernandez, PT 07/02/18 1016      Row Name 07/02/18 0920             Transfer Assessment/Treatment    Transfer Assessment/Treatment sit-stand transfer;stand-sit transfer  -LR      Comment (Transfers) Verbal cues to push up from chair to stand instead of pulling up on RW. Verbal cues to reach back for chair to lower into sitting instead of holding onto RW. Verbal cues to limit trunk flexion during t/f. Required increased time to perform.   -LR      Sit-Stand [place] (Transfers) verbal cues;contact guard  -LR      Stand-Sit [place] (Transfers) verbal cues;contact guard  -LR      Recorded by [LR] Adrianne Fernandez, PT 07/02/18 0951      Row Name 07/02/18 0920             Sit-Stand Transfer    Assistive Device (Sit-Stand Transfers) walker, front-wheeled  -LR      Recorded by [LR] Adrianne Fernandez, PT 07/02/18 0951      Row Name 07/02/18 0920             Stand-Sit Transfer    Assistive Device (Stand-Sit Transfers) walker, front-wheeled  -LR      Recorded by [LR] Adrianne Fernandez, PT 07/02/18 0951      Row Name 07/02/18 0920             Gait/Stairs Assessment/Training    64446 - Gait Training Minutes  15  -LR      [place]iley Level (Gait) verbal cues;contact guard  -LR2      Assistive Device (Gait) walker, front-wheeled  -LR2      Distance in Feet (Gait) 140  -LR2      Pattern (Gait) step-through  -LR2      Deviations/Abnormal Patterns (Gait) bilateral deviations;jackie decreased;gait speed decreased;stride length decreased  -LR2      Bilateral Gait Deviations forward flexed posture;heel strike decreased  -LR2      Comment (Gait/Stairs) Patient initiated gait with step to gait pattern at slow pace. Verbal cues to stay close to RW, for steering of RW, upright posture, increased LE weight bearing, and decreased UE weight bearing. Improved with cues for correction. Ambulated with step through gait pattern back to room. Gait  limited by fatigue and pain.   -LR3      Recorded by [LR] Adrianne Fernandez, PT 07/02/18 1016  [LR2] Adrianne Fernandez, PT 07/02/18 0951  [LR3] Adrianne Fernandez, PT 07/02/18 1018      Row Name 07/02/18 0920             Therapeutic Exercise    91174 - PT Therapeutic Exercise Minutes 10  -LR      Recorded by [LR] Adrianne Fernandez, PT 07/02/18 1016      Row Name 07/02/18 0920             Therapeutic Exercise    Lower Extremity (Therapeutic Exercise) gluteal sets;heel slides, bilateral;quad sets, bilateral  -LR      Lower Extremity Range of Motion (Therapeutic Exercise) hip internal/external rotation, bilateral;ankle dorsiflexion/plantar flexion, bilateral  -LR      Core Strength (Therapeutic Exercise) abdominal bracing   ab sets  -LR      Exercise Type (Therapeutic Exercise) AROM (active range of motion);isometric contraction, static;isotonic contraction, concentric  -LR      Position (Therapeutic Exercise) seated  -LR      Sets/Reps (Therapeutic Exercise) x10 reps each  -LR      Comment (Therapeutic Exercise) cues for technique  -LR      Recorded by [LR] Adrianne Fernandez, PT 07/02/18 1016      Row Name 07/02/18 0920             Positioning and Restraints    Pre-Treatment Position sitting in chair/recliner  -LR      Post Treatment Position bed  -LR      In Bed notified nsg;supine;call light within reach;encouraged to call for assist;exit alarm on;side rails up x3  -LR      Recorded by [LR] Adrianne Fernandez, PT 07/02/18 1016      Row Name 07/02/18 0920             Pain Assessment    Additional Documentation Pain Scale: FACES Pre/Post-Treatment (Group)  -LR      Recorded by [LR] Adrianne Fernandez, PT 07/02/18 1016      Row Name 07/02/18 0920             Pain Scale: FACES Pre/Post-Treatment    Pain: FACES Scale, Pretreatment 0-->no hurt  -LR      Pain: FACES Scale, Post-Treatment 2-->hurts little bit  -LR      Recorded by [LR] Adrianne Fernandez, PT 07/02/18 1016      Row Name 07/02/18  0920             Sensory Assessment/Intervention    Sensory General Assessment --   denies numbness/tingling  -LR      Recorded by [LR] Adrianne Fernandez, PT 07/02/18 1016      Row Name                Wound 06/22/18 1347 Other (See comments) back incision    Wound - Properties Group Date first assessed: 06/22/18 [KS] Time first assessed: 1347 [KS] Side: Other (See comments) [KS] Location: back [KS] Type: incision [KS] Recorded by:  [KS] Hemalatha Gomez RN 06/22/18 1347    Row Name                Wound 06/22/18 1347 Other (See comments) back incision    Wound - Properties Group Date first assessed: 06/22/18 [KS] Time first assessed: 1347 [KS] Side: Other (See comments) [KS] Location: back [KS] Type: incision [KS] Recorded by:  [KS] Hemalatha Gomez RN 06/22/18 1347    Row Name 07/02/18 0920             Coping    Observed Emotional State accepting;cooperative  -LR      Verbalized Emotional State acceptance  -LR      Recorded by [LR] Adrianne Fernandez, PT 07/02/18 1016      Row Name 07/02/18 0920             Plan of Care Review    Plan of Care Reviewed With patient  -LR      Recorded by [LR] Adrianne Fernandez, PT 07/02/18 1016      Row Name 07/02/18 0920             Outcome Summary/Treatment Plan (PT)    Daily Summary of Progress (PT) progress toward functional goals is good  -LR      Recorded by [LR] Adrianne Fernandez, PT 07/02/18 1016        User Key  (r) = Recorded By, (t) = Taken By, (c) = Cosigned By    Initials Name Effective Dates Discipline    LR Adrianne Fernandez, PT 06/19/15 -  PT    DEXTER Gomez RN 05/09/17 -  Nurse          Wound 06/22/18 1347 Other (See comments) back incision (Active)   Dressing Appearance dry;intact;no drainage 7/2/2018  8:00 AM   Closure Adhesive closure strips 7/1/2018  5:40 PM   Base pink 7/1/2018  5:40 PM   Periwound dry;intact;pink 7/1/2018  5:40 PM   Periwound Temperature warm 7/1/2018  5:40 PM   Edges rolled/closed 7/1/2018  5:40 PM   Drainage  Characteristics/Odor serous 7/1/2018  5:40 PM   Drainage Amount none 7/2/2018  8:00 AM   Care, Wound cleansed with;sterile normal saline 7/1/2018  5:40 PM   Dressing Care, Wound border dressing 7/2/2018  8:00 AM   Periwound Care, Wound dry periwound area maintained 7/1/2018  5:40 PM       Wound 06/22/18 1347 Other (See comments) back incision (Active)   Dressing Appearance dry;intact;no drainage 7/2/2018  8:00 AM   Drainage Amount none 7/2/2018  8:00 AM   Dressing Care, Wound border dressing 7/2/2018  8:00 AM             Physical Therapy Education     Title: PT OT SLP Therapies (Active)     Topic: Physical Therapy (Active)     Point: Mobility training (Active)    Learning Progress Summary     Learner Status Readiness Method Response Comment Documented by    Patient Active Acceptance ED NR Educated on spinal precautions, correct log rolling technique, correct gait mechanics, and HEP. LR 07/02/18 1016     Active Acceptance E TITUS   07/01/18 1623     Done Acceptance E TITUS PETERS   06/30/18 1112     Active Acceptance E NR Educated on proper mechanics for safe functional mobility. Needs reinforcement d/t cognition.  06/28/18 1406     Active Acceptance E,D NR Reviewed back precautions, safety with mobility/transfers, HEP.  06/27/18 1557     Active Acceptance E NR,NL BACK PRECAUTIONS, IMPORTANCE OF HAVING BRACE UP AT ALL TIMES WHEN OOB. CD 06/26/18 1109     Done Acceptance E,D LORRAINE,NR Reviewed benefits of activity, spinal precautions, log roll technique, correct gait mechanics, HEP.  06/25/18 0906     Done Acceptance E,D LORRAINE,NR Reviewed spinal precautions, correct gait mechanics, HEP, benefits of activity.  06/24/18 1352     Active Acceptance E,D,H NR Reviewed spinal precautions, log roll technique, performing ankle pumps throughout the day, correct gait mechanics, safety with mobility and transfers, wear schedule for lumbar brace.  06/23/18 1457    Significant Other Done Acceptance E,D LORRAINE,NR Reviewed spinal  precautions, correct gait mechanics, HEP, benefits of activity.  06/24/18 1352     Active Acceptance E,D,H NR Reviewed spinal precautions, log roll technique, performing ankle pumps throughout the day, correct gait mechanics, safety with mobility and transfers, wear schedule for lumbar brace.  06/23/18 1597          Point: Home exercise program (Active)    Learning Progress Summary     Learner Status Readiness Method Response Comment Documented by    Patient Active Acceptance E,D NR Educated on spinal precautions, correct log rolling technique, correct gait mechanics, and HEP. LR 07/02/18 1016     Active Acceptance E NR  KR 07/01/18 1623     Done Acceptance E TITUS PETERS   06/30/18 1112     Active Acceptance E NR Educated on proper mechanics for safe functional mobility. Needs reinforcement d/t cognition. VG 06/28/18 1406     Active Acceptance E,D NR Reviewed back precautions, safety with mobility/transfers, HEP.  06/27/18 1557     Active Acceptance E NR,NL BACK PRECAUTIONS, IMPORTANCE OF HAVING BRACE UP AT ALL TIMES WHEN OOB. CD 06/26/18 1109     Done Acceptance E,D LORRAINENR Reviewed benefits of activity, spinal precautions, log roll technique, correct gait mechanics, HEP.  06/25/18 0906     Done Acceptance E,D LORRAINENR Reviewed spinal precautions, correct gait mechanics, HEP, benefits of activity.  06/24/18 1352     Active Acceptance E,D,H NR Reviewed spinal precautions, log roll technique, performing ankle pumps throughout the day, correct gait mechanics, safety with mobility and transfers, wear schedule for lumbar brace.  06/23/18 1457    Significant Other Done Acceptance E,D LORRAINENR Reviewed spinal precautions, correct gait mechanics, HEP, benefits of activity.  06/24/18 1352     Active Acceptance E,D,H NR Reviewed spinal precautions, log roll technique, performing ankle pumps throughout the day, correct gait mechanics, safety with mobility and transfers, wear schedule for lumbar brace.  06/23/18 6401           Point: Body mechanics (Active)    Learning Progress Summary     Learner Status Readiness Method Response Comment Documented by    Patient Active Acceptance E,D NR Educated on spinal precautions, correct log rolling technique, correct gait mechanics, and HEP. LR 07/02/18 1016     Active Acceptance E NR  KR 07/01/18 1623     Done Acceptance E TITUS PETERS   06/30/18 1112     Active Acceptance E NR Educated on proper mechanics for safe functional mobility. Needs reinforcement d/t cognition.  06/28/18 1406     Active Acceptance E,D NR Reviewed back precautions, safety with mobility/transfers, HEP.  06/27/18 1557     Active Acceptance E NR,NL BACK PRECAUTIONS, IMPORTANCE OF HAVING BRACE UP AT ALL TIMES WHEN OOB. CD 06/26/18 1109     Done Acceptance E,D LORRAINE,NR Reviewed benefits of activity, spinal precautions, log roll technique, correct gait mechanics, HEP.  06/25/18 0906     Done Acceptance E,D LORRAINE,NR Reviewed spinal precautions, correct gait mechanics, HEP, benefits of activity.  06/24/18 1352     Active Acceptance E,D,H NR Reviewed spinal precautions, log roll technique, performing ankle pumps throughout the day, correct gait mechanics, safety with mobility and transfers, wear schedule for lumbar brace.  06/23/18 1457    Significant Other Done Acceptance E,D LORRAINE,NR Reviewed spinal precautions, correct gait mechanics, HEP, benefits of activity.  06/24/18 1352     Active Acceptance E,D,H NR Reviewed spinal precautions, log roll technique, performing ankle pumps throughout the day, correct gait mechanics, safety with mobility and transfers, wear schedule for lumbar brace.  06/23/18 1457          Point: Precautions (Active)    Learning Progress Summary     Learner Status Readiness Method Response Comment Documented by    Patient Active Acceptance E,D NR Educated on spinal precautions, correct log rolling technique, correct gait mechanics, and HEP. LR 07/02/18 1016     Active Acceptance E NR  KR 07/01/18 1623      Done Acceptance E VU,NR   06/30/18 1112     Active Acceptance E NR Educated on proper mechanics for safe functional mobility. Needs reinforcement d/t cognition.  06/28/18 1406     Active Acceptance E,D NR Reviewed back precautions, safety with mobility/transfers, HEP.  06/27/18 1557     Active Acceptance E NR,NL BACK PRECAUTIONS, IMPORTANCE OF HAVING BRACE UP AT ALL TIMES WHEN OOB.  06/26/18 1109     Done Acceptance E,D VU,NR Reviewed benefits of activity, spinal precautions, log roll technique, correct gait mechanics, HEP.  06/25/18 0906     Done Acceptance E,D VU,NR Reviewed spinal precautions, correct gait mechanics, HEP, benefits of activity.  06/24/18 1352     Active Acceptance E,D,H NR Reviewed spinal precautions, log roll technique, performing ankle pumps throughout the day, correct gait mechanics, safety with mobility and transfers, wear schedule for lumbar brace.  06/23/18 1457    Significant Other Done Acceptance E,D VU,NR Reviewed spinal precautions, correct gait mechanics, HEP, benefits of activity.  06/24/18 1352     Active Acceptance E,D,H NR Reviewed spinal precautions, log roll technique, performing ankle pumps throughout the day, correct gait mechanics, safety with mobility and transfers, wear schedule for lumbar brace.  06/23/18 1457                      User Key     Initials Effective Dates Name Provider Type Discipline     06/19/15 -  Stephanie Boyd, PT Physical Therapist PT     06/19/15 -  Chad Lizama, PT Physical Therapist PT    LR 06/19/15 -  Adrianne Fernandez, PT Physical Therapist PT    KR 04/03/18 -  Narcisa Cheek, PT Physical Therapist PT    LM 04/03/18 -  Kathleen Barrios, PT Physical Therapist PT    VG 05/29/18 -  Margi Lauren, PT Physical Therapist PT                    PT Recommendation and Plan     Outcome Summary/Treatment Plan (PT)  Daily Summary of Progress (PT): progress toward functional goals is good  Plan of Care Reviewed With: patient  Progress:  improving  Outcome Summary: Patient ambulated 140 feet with RW, limited by pain and weakness. CGA for all OOB mobility. Will continue to progress mobility training and strengthening as able.           Outcome Measures     Row Name 07/02/18 0920 07/01/18 1511 07/01/18 1440       How much help from another person do you currently need...    Turning from your back to your side while in flat bed without using bedrails? 3  -LR 3  -KR  --    Moving from lying on back to sitting on the side of a flat bed without bedrails? 3  -LR 3  -KR  --    Moving to and from a bed to a chair (including a wheelchair)? 3  -LR 3  -KR  --    Standing up from a chair using your arms (e.g., wheelchair, bedside chair)? 3  -LR 3  -KR  --    Climbing 3-5 steps with a railing? 2  -LR 2  -KR  --    To walk in hospital room? 3  -LR 3  -KR  --    AM-PAC 6 Clicks Score 17  -LR 17  -KR  --       How much help from another is currently needed...    Putting on and taking off regular lower body clothing?  --  -- 2  -MC    Bathing (including washing, rinsing, and drying)  --  -- 2  -MC    Toileting (which includes using toilet bed pan or urinal)  --  -- 2  -MC    Putting on and taking off regular upper body clothing  --  -- 2  -MC    Taking care of personal grooming (such as brushing teeth)  --  -- 2  -MC    Eating meals  --  -- 2  -MC    Score  --  -- 12  -MC       Functional Assessment    Outcome Measure Options AM-PAC 6 Clicks Basic Mobility (PT)  -LR AM-PAC 6 Clicks Basic Mobility (PT)  -KR AM-PAC 6 Clicks Daily Activity (OT)  -MC    Row Name 06/30/18 1040             How much help from another person do you currently need...    Turning from your back to your side while in flat bed without using bedrails? 2  -JM      Moving from lying on back to sitting on the side of a flat bed without bedrails? 2  -JM      Moving to and from a bed to a chair (including a wheelchair)? 3  -JM      Standing up from a chair using your arms (e.g., wheelchair, bedside  chair)? 3  -JM      Climbing 3-5 steps with a railing? 2  -JM      To walk in hospital room? 3  -JM      AM-PAC 6 Clicks Score 15  -JM         Functional Assessment    Outcome Measure Options AM-PAC 6 Clicks Basic Mobility (PT)  -JM        User Key  (r) = Recorded By, (t) = Taken By, (c) = Cosigned By    Initials Name Provider Type     Chad Lizama, PT Physical Therapist    LR Adrianne Fernandez, PT Physical Therapist    AMANDA Monahan, OT Occupational Therapist    DIEGO Cheek, PT Physical Therapist           Time Calculation:         PT Charges     Row Name 07/02/18 0920             Time Calculation    Start Time 0920  -LR      PT Received On 07/02/18  -LR      PT Goal Re-Cert Due Date 07/03/18  -LR         Time Calculation- PT    Total Timed Code Minutes- PT 25 minute(s)  -LR         Timed Charges    33264 - PT Therapeutic Exercise Minutes 10  -LR      23626 - Gait Training Minutes  15  -LR        User Key  (r) = Recorded By, (t) = Taken By, (c) = Cosigned By    Initials Name Provider Type    LR Adrianne Fernandez, PT Physical Therapist        Therapy Suggested Charges     Code   Minutes Charges    65268 (CPT®) Hc Pt Neuromusc Re Education Ea 15 Min      40883 (CPT®) Hc Pt Ther Proc Ea 15 Min 10 1    65215 (CPT®) Hc Gait Training Ea 15 Min 15 1    26591 (CPT®) Hc Pt Therapeutic Act Ea 15 Min      89929 (CPT®) Hc Pt Manual Therapy Ea 15 Min      02720 (CPT®) Hc Pt Iontophoresis Ea 15 Min      28927 (CPT®) Hc Pt Elec Stim Ea-Per 15 Min      00663 (CPT®) Hc Pt Ultrasound Ea 15 Min      28428 (CPT®) Hc Pt Self Care/Mgmt/Train Ea 15 Min      Total  25 2        Therapy Charges for Today     Code Description Service Date Service Provider Modifiers Qty    23135154583 HC PT THER PROC EA 15 MIN 7/2/2018 Adrianne Fernandez, PT GP 1    32739446492 HC GAIT TRAINING EA 15 MIN 7/2/2018 Adrianne Fernandez, PT GP 1          PT G-Codes  PT Professional Judgement Used?: Yes  Outcome Measure Options: AM-PAC 6  Clicks Basic Mobility (PT)  Score: 14  Functional Limitation: Mobility: Walking and moving around  Mobility: Walking and Moving Around Current Status (): At least 40 percent but less than 60 percent impaired, limited or restricted  Mobility: Walking and Moving Around Goal Status (): At least 1 percent but less than 20 percent impaired, limited or restricted    Adrianne Fernandez, PT  7/2/2018

## 2018-07-02 NOTE — PLAN OF CARE
Problem: Patient Care Overview  Goal: Plan of Care Review  Outcome: Ongoing (interventions implemented as appropriate)   07/02/18 7779   Coping/Psychosocial   Plan of Care Reviewed With patient   OTHER   Outcome Summary Pt with improved mentation, demonstrating ability to recall spinal precautions along with being CGA for sit/stand transfers and mobilty in room using rwx. Pt SUA for ADL tasks and adjusting socks. Will continue to progress with ADL tasks and safety in preparation for d/c home.

## 2018-07-02 NOTE — PROGRESS NOTES
Continued Stay Note  [facility]     Patient Name: Sai Main  MRN: [id number]  Today's Date: 7/2/2018    Admit Date: 6/22/2018          Discharge Plan     Row Name 07/02/18 1258       Plan    Plan update    Patient/Family in Agreement with Plan yes    Plan Comments CM spoke marline Vaz at [facility] who is still assessing pt for a bed. there is not one today. CM will need to f/u marline Vaz first thing tomorrow. [facility] rescheduled for 2pm on 7/3/2018. Form on CM desk. Pt is aware. CM contacted Cathie MATIAS who works with [facility] that wrote the d/c order to let her know pt was staying today and will need to be seen tomorrow - message will be relayed to PA. No other needs at this time.              Discharge Codes    No documentation.       Expected Discharge Date and Time     Expected Discharge Date Expected Discharge Time    Jun 29, 2018             Kecia Bautista, RN

## 2018-07-02 NOTE — PROGRESS NOTES
[facility] Medicine Services  PROGRESS NOTE    Patient Name: Sai Main  : 1945  MRN: [id number]    Date of Admission: 2018  Length of Stay: 10  Primary Care Physician: Jose Pereira MD    Subjective   Subjective     CC:  F/U medical management    HPI:  Resting in bed with nursing staff at bedside.  Complaining of uncontrolled pain.  Patient reports that he slept fairly well last night.      Review of Systems  CV-no chest pain, no palpitations  Resp-no cough, no dyspnea  GI-no N/V/D, no abd pain      Otherwise ROS is negative except as mentioned in the HPI.    Objective   Objective     Vital Signs:   Temp:  [97 °F (36.1 °C)-99.2 °F (37.3 °C)] 99.2 °F (37.3 °C)  Heart Rate:  [68-77] 76  Resp:  [12-16] 16  BP: (155-216)/(62-96) 155/62        Physical Exam:  Gen-no acute distress, awake, alert, resting in bed, no family at bedside.   HENT- normocephalic, mucous membranes moist.  CV-RRR, S1 S2 normal, no m/r/g  Resp-CTAB, no wheezes, unlabored breathing on room air  Abd-soft, NT, ND, +BS  Ext-no edema  Neuro-A&Ox3, no focal deficits  Psych-appropriate mood, cooperative      Results Reviewed:  I have personally reviewed current lab, radiology, and data and agree.      Results from last 7 days  Lab Units 18  0954 18  09   WBC 10*3/mm3 10.48 7.49 7.55   HEMOGLOBIN g/dL 11.3* 9.7* 9.0*   HEMATOCRIT % 34.7* 30.0* 27.9*   PLATELETS 10*3/mm3 435 299 230       Results from last 7 days  Lab Units 18  0954 18  0407 18  0926 18  1526   SODIUM mmol/L 136 136 136 134   POTASSIUM mmol/L 4.2 4.8 4.8 5.1   CHLORIDE mmol/L 98* 104 104 100   CO2 mmol/L 23.0 20.0 26.0 24.0   BUN mg/dL 26* 32* 43* 31*   CREATININE mg/dL 1.07 1.50* 2.02* 1.92*   GLUCOSE mg/dL 243* 123* 81 148*   CALCIUM mg/dL 10.5* 10.3 8.6* 9.7   ALT (SGPT) U/L  --   --   --  14   AST (SGOT) U/L  --   --   --  29     Estimated Creatinine Clearance: 81.2 mL/min (by C-G  formula based on SCr of 1.07 mg/dL).  No results found for: BNP    Microbiology Results Abnormal     Procedure Component Value - Date/Time    Eosinophil Smear - Urine, Urine, Clean Catch [[id number]]  (Normal) Collected:  06/27/18 2227    Lab Status:  Final result Specimen:  Urine from Urine, Clean Catch Updated:  06/27/18 2335     Eosinophil Smear 0 % EOS/100 Cells     Narrative:       No eosinophil seen          Imaging Results (last 24 hours)     ** No results found for the last 24 hours. **             I have reviewed the medications.    Assessment/Plan   Assessment / Plan     Hospital Problem List     Physical deconditioning    Spondylosis with myelopathy, lumbar region    Overview Signed 5/23/2018 11:36 AM by Malina Irving PA-C     Added automatically from request for surgery [id number]         Spinal stenosis, lumbar region, with neurogenic claudication    Overview Signed 5/23/2018 11:36 AM by Malina Irving PA-C     Added automatically from request for surgery [id number]         Degenerative disc disease, lumbar    Overview Signed 5/23/2018 11:36 AM by Malina Irving PA-C     Added automatically from request for surgery [id number]         Osteoarthritis of spine with radiculopathy, lumbar region    Overview Signed 5/23/2018 11:36 AM by Malina Irving PA-C     Added automatically from request for surgery [id number]         Spondylosis    Impaired functional mobility, balance, gait, and endurance    Metabolic encephalopathy    AYE (acute kidney injury) (CMS/Formerly [facility])    Anemia    Hypotension    Hypoglycemia             Brief Hospital Course to date:  Sia Main is a 72 y.o. male who presented to the hospital on 6/22/18 for planned surgical intervention for lumbar spinal stenosis with Dr. Cantu. He has had complicated post-operative course including acute blood loss anemia requiring blood transfusion, confusion and disorientation, hypotension, hypoglycemia, and acute kidney injury. Hospitalist service  consulted 6/27 for medical management.       Assessment & Plan:  --Post-op care per Dr. Cantu.  --Confusion most likely due to delirium caused by narcotics, post-op state, etc. Stopped scheduled pain meds. Dr. Cantu started scheduled Haldol. His mental status is much better.    --Still needs some assistance with sleep cycle regulation.  Melatonin ordered.   --Hypoglycemia resolved. HbA1c is only 5.7%. Resumed low dose SSI.  Added low dose levemir, titration as necessary.  Increased to 8 units this am.  --AYE resolved with fluids. Probably due to hypotension, acute blood loss anemia. Renal ultrasound unremarkable. Repeat BMP in am to assess kidney function, as I am increasing his lisinopril today.    --Hypotension resolved with fluids, now hypertensive. Titrating lisinopril up today.  Labetalol PRN  --Anemia stable s/p 2 units PRBC.  --Mental status much improved, probably close to baseline now. No further need for a sitter.    CODE STATUS:   Code Status and Medical Interventions:   Ordered at: 06/22/18 3699     Level Of Support Discussed With:    Patient     Code Status:    CPR     Medical Interventions (Level of Support Prior to Arrest):    Full       Disposition: I expect the patient to be discharged to [facility] per Neurosurgery      Electronically signed by ROVERTO Tian, 07/02/18, 3:12 PM.

## 2018-07-02 NOTE — PLAN OF CARE
Problem: Patient Care Overview  Goal: Plan of Care Review  Outcome: Ongoing (interventions implemented as appropriate)   07/02/18 1804   Coping/Psychosocial   Plan of Care Reviewed With patient   Plan of Care Review   Progress improving   OTHER   Outcome Summary BP remains elevated this shift. Lisinopril increased by 20mg, now getting cumulative dose of 40mg QD. C/O pain throughout shift, NICCI hips, prn percocet and tylenol alternated. Pt A&Ox4, pleasant. Back brace to be worn when up. Up with 1 assist walker and gait belt. Plan for [facility] tomorrow at d/c. [facility] scheduled at 1400.

## 2018-07-02 NOTE — THERAPY TREATMENT NOTE
Acute Care - Occupational Therapy Treatment Note  [facility]     Patient Name: Sai Main  : 1945  MRN: [id number]  Today's Date: 2018  Onset of Illness/Injury or Date of Surgery: 18  Date of Referral to OT: 18  Referring Physician: MD Pepe    Admit Date: 2018       ICD-10-CM ICD-9-CM   1. Impaired functional mobility, balance, gait, and endurance Z74.09 V49.89   2. Impaired mobility and ADLs Z74.09 799.89     Patient Active Problem List   Diagnosis   • Episode of change in speech   • Spinal stenosis of lumbar region with neurogenic claudication   • Radicular pain of both lower extremities   • Chronic bilateral low back pain with bilateral sciatica   • DDD (degenerative disc disease), lumbar   • Spondylosis of lumbar region without myelopathy or radiculopathy   • Congestive heart failure   • IDDM (insulin dependent diabetes mellitus)   • Insomnia   • Physical deconditioning   • Anxiety and depression   • At high risk for falls   • Gait disturbance   • Diabetic polyneuropathy associated with type 1 diabetes mellitus   • Left foot drop   • Moderate obesity   • Spondylosis with myelopathy, lumbar region   • Spinal stenosis, lumbar region, with neurogenic claudication   • Degenerative disc disease, lumbar   • Osteoarthritis of spine with radiculopathy, lumbar region   • Spondylosis   • Impaired functional mobility, balance, gait, and endurance   • Metabolic encephalopathy   • AYE (acute kidney injury)   • Anemia   • Hypotension   • Hypoglycemia     Past Medical History:   Diagnosis Date   • Anemia    • Arthritis    • Bleeding disorder (CMS/[facility]ly [facility])    • CHF (congestive heart failure) (CMS/[facility]ly [facility])    • DDD (degenerative disc disease), lumbar    • Diabetes mellitus (CMS/[facility])    • DJD (degenerative joint disease), lumbar    • Foot drop    • Heart murmur    • Hernia    • History of transfusion    • Hypertension    • Low back pain    • Neurogenic claudication due to lumbar spinal stenosis    •  Radiculopathy with lower extremity symptoms    • Spinal stenosis of lumbar region 2014   • Wears dentures     upper     Past Surgical History:   Procedure Laterality Date   • BUNIONECTOMY     • CARDIAC CATHETERIZATION      2 STENTS   • CARDIAC SURGERY      one blockage   • CERVICAL SPINE SURGERY     • COLONOSCOPY     • CORONARY ARTERY BYPASS GRAFT      Sees Dr. Scott @ [facility]   • ENDOSCOPY     • EYE SURGERY      with lens   • LUMBAR LAMINECTOMY DISCECTOMY DECOMPRESSION N/A 6/22/2018    Procedure: LUMBAR LAMINECTOMY L2-5;  Surgeon: Jose Cantu MD;  Location: [facility];  Service: Neurosurgery   • SKIN BIOPSY         Therapy Treatment          Rehabilitation Treatment Summary     Row Name 07/02/18 0928 07/02/18 0920          Treatment Time/Intention    Discipline occupational therapist  -ST physical therapist  -LR     Document Type therapy note (daily note)  -ST therapy note (daily note)   returned later for ther ex and to assist patient back to bed  -LR2     Subjective Information no complaints  -ST no complaints   denies pain at rest  -LR     Mode of Treatment occupational therapy  -ST physical therapy;individual therapy  -LR     Patient/Family Observations pt sitting UIC w/o LSO spinal brace  -ST Patient sitting reclined UIC on arrival.   -LR     Care Plan Review care plan/treatment goals reviewed;risks/benefits reviewed;current/potential barriers reviewed;patient/other agree to care plan  -ST care plan/treatment goals reviewed;risks/benefits reviewed;current/potential barriers reviewed;patient/other agree to care plan  -LR     Therapy Frequency (OT Eval) daily  -ST  --     Patient Effort good  -ST good  -LR     Existing Precautions/Restrictions fall;spinal;brace worn when out of bed   brace when OOB  -ST fall;spinal;brace worn when out of bed;other (see comments)   back brace on when up OOB  -LR     Patient Response to Treatment significantly improved cognition and direction following this sesison    -ST  --     Recorded by [ST] Brea Miles, OTR 07/02/18 1434 [LR] Adrianne Fernandez, PT 07/02/18 0951  [LR2] Adrianne Fernandez, PT 07/02/18 1016     Row Name 07/02/18 0928 07/02/18 0920          Cognitive Assessment/Intervention- PT/OT    Affect/Mental Status (Cognitive) WNL  -ST WFL  -LR     Behavioral Issues (Cognitive) --   able to recall 3/3 spinal precautions  -ST  --     Orientation Status (Cognition) oriented x 4  -ST oriented x 4  -LR     Follows Commands (Cognition) follows two step commands;over 90% accuracy  -ST follows one step commands;75-90% accuracy;verbal cues/prompting required;repetition of directions required  -LR     Safety Deficit (Cognitive)  -- mild deficit;insight into deficits/self awareness;problem solving;safety precautions awareness;safety precautions follow-through/compliance  -LR     Personal Safety Interventions fall prevention program maintained;gait belt;nonskid shoes/slippers when out of bed  -ST  --     Recorded by [ST] Brea Miles, OTR 07/02/18 1434 [LR] Adrianne Fernandez, PT 07/02/18 0951     Row Name 07/02/18 0920             Safety Issues, Functional Mobility    Safety Issues Affecting Function (Mobility) insight into deficits/self awareness;positioning of assistive device;problem solving;sequencing abilities;safety precautions follow-through/compliance;safety precaution awareness  -LR      Recorded by [LR] Adrianne Fernandez, PT 07/02/18 0951      Row Name 07/02/18 0928 07/02/18 0920          Bed Mobility Assessment/Treatment    Supine-Sit [place] (Bed Mobility)  -- not tested   UI on arrival.   -LR     Sit-Supine [place] (Bed Mobility)  -- verbal cues;moderate assist (50% patient effort)  -LR2     Bed Mobility, Safety Issues  -- decreased use of legs for bridging/pushing  -LR2     Assistive Device (Bed Mobility)  -- head of bed elevated;bed rails  -LR2     Comment (Bed Mobility) UI  -ST Verbal cues for correct log rolling technique. Mod  assist to lift LEs up into bed.   -LR2     Recorded by [ST] Brea Miles, OTR 07/02/18 1434 [LR] Adrianne Fernandez, PT 07/02/18 0951  [LR2] Adrianne Fernandez, PT 07/02/18 1016     Row Name 07/02/18 0928             Functional Mobility    Functional Mobility- Ind. Level contact guard assist  -ST      Functional Mobility- Device rolling walker  -ST      Functional Mobility-Distance (Feet) 15  -ST      Functional Mobility- Comment in room, to sink   -ST      Recorded by [ST] Brea Miles, OTR 07/02/18 1434      Row Name 07/02/18 0928 07/02/18 0920          Transfer Assessment/Treatment    Transfer Assessment/Treatment  -- sit-stand transfer;stand-sit transfer  -LR     Comment (Transfers) vc'ing for hand placement   -ST Verbal cues to push up from chair to stand instead of pulling up on RW. Verbal cues to reach back for chair to lower into sitting instead of holding onto RW. Verbal cues to limit trunk flexion during t/f. Required increased time to perform.   -LR     Sit-Stand [place] (Transfers) contact guard  -ST verbal cues;contact guard  -LR     Stand-Sit [place] (Transfers) contact guard  -ST verbal cues;contact guard  -LR     Recorded by [ST] Brea Miles, OTR 07/02/18 1434 [LR] Adrianne Fernandez, PT 07/02/18 0951     Row Name 07/02/18 0928 07/02/18 0920          Sit-Stand Transfer    Assistive Device (Sit-Stand Transfers) walker, front-wheeled  -ST walker, front-wheeled  -LR     Recorded by [ST] Brea Miles, OTR 07/02/18 1434 [LR] Adrianne Fernandez, PT 07/02/18 0951     Row Name 07/02/18 0928 07/02/18 0920          Stand-Sit Transfer    Assistive Device (Stand-Sit Transfers) walker, front-wheeled  -ST walker, front-wheeled  -LR     Recorded by [ST] Brea Miles, OTR 07/02/18 1434 [LR] Adrianne Fernandez, PT 07/02/18 0951     Row Name 07/02/18 0920             Gait/Stairs Assessment/Training    87142 - Gait Training Minutes  15  -LR      [place] Level (Gait)  verbal cues;contact guard  -LR2      Assistive Device (Gait) walker, front-wheeled  -LR2      Distance in Feet (Gait) 140  -LR2      Pattern (Gait) step-through  -LR2      Deviations/Abnormal Patterns (Gait) bilateral deviations;jackie decreased;gait speed decreased;stride length decreased  -LR2      Bilateral Gait Deviations forward flexed posture;heel strike decreased  -LR2      Comment (Gait/Stairs) Patient initiated gait with step to gait pattern at slow pace. Verbal cues to stay close to RW, for steering of RW, upright posture, increased LE weight bearing, and decreased UE weight bearing. Improved with cues for correction. Ambulated with step through gait pattern back to room. Gait limited by fatigue and pain.   -LR3      Recorded by [LR] Adrianne Fernandez, PT 07/02/18 1016  [LR2] Adrianne Fernandez, PT 07/02/18 0951  [LR3] Adrianne Fernandez, PT 07/02/18 1018      Row Name 07/02/18 0928             ADL Assessment/Intervention    42132 - OT Self Care/Mgmt Minutes 15  -ST      Recorded by [ST] Brea Miles, OTR 07/02/18 1434      Row Name 07/02/18 0928             Lower Body Dressing Assessment/Training    Lower Body Dressing [place] Level independent  -ST      Comment (Lower Body Dressing) pt completed adjusting B socks sitting EOC w/o use of AE and while maintaining spinal precautions   -ST      Recorded by [ST] Brea Miles, OTR 07/02/18 1434      Row Name 07/02/18 0928             Grooming Assessment/Training    [place] Level (Grooming) wash face, hands;oral care regimen;set up  -ST      Grooming Position supported standing  -ST      Comment (Grooming) completed opening packages and tubes for denture scrub and wash   -ST      Recorded by [ST] Brea Miles, OTR 07/02/18 1434      Row Name 07/02/18 0928             Self-Feeding Assessment/Training    [place] Level (Feeding) liquids to mouth;independent  -ST      Position (Self-Feeding) unsupported sitting  -ST       Recorded by [ST] Brea Miles, OTR 07/02/18 1434      Row Name 07/02/18 0928 07/02/18 0920          Therapeutic Exercise    53658 - PT Therapeutic Exercise Minutes  -- 10  -LR     92821 - OT Therapeutic Activity Minutes 12  -ST  --     Recorded by [ST] Brea Miles, OTR 07/02/18 1434 [LR] Adrianne Fernandez, PT 07/02/18 1016     Row Name 07/02/18 0920             Therapeutic Exercise    Lower Extremity (Therapeutic Exercise) gluteal sets;heel slides, bilateral;quad sets, bilateral  -LR      Lower Extremity Range of Motion (Therapeutic Exercise) hip internal/external rotation, bilateral;ankle dorsiflexion/plantar flexion, bilateral  -LR      Core Strength (Therapeutic Exercise) abdominal bracing   ab sets  -LR      Exercise Type (Therapeutic Exercise) AROM (active range of motion);isometric contraction, static;isotonic contraction, concentric  -LR      Position (Therapeutic Exercise) seated  -LR      Sets/Reps (Therapeutic Exercise) x10 reps each  -LR      Comment (Therapeutic Exercise) cues for technique  -LR      Recorded by [LR] Adrianne Fernandez, PT 07/02/18 1016      Row Name 07/02/18 0928             Static Sitting Balance    Level of [place] (Unsupported Sitting, Static Balance) independent  -ST      Recorded by [ST] Brea Miles, OTR 07/02/18 1434      Row Name 07/02/18 0928             Dynamic Sitting Balance    Level of [place], Reaches Outside Midline (Sitting, Dynamic Balance) independent  -ST      Recorded by [ST] Brea Miles, OTR 07/02/18 1434      Row Name 07/02/18 0928             Static Standing Balance    Level of [place] (Supported Standing, Static Balance) supervision  -ST      Comment (Unsupported Standing, Static Balance) completed ADL tasks with SUA   -ST      Recorded by [ST] Brea Miles, OTR 07/02/18 1434      Row Name 07/02/18 0928             Dynamic Standing Balance    Level of [place], Reaches Outside Midline (Standing, Dynamic Balance)  contact guard assist  -ST      Recorded by [ST] Brea Miles, OTR 07/02/18 1434      Row Name 07/02/18 0928 07/02/18 0920          Positioning and Restraints    Pre-Treatment Position sitting in chair/recliner  -ST sitting in chair/recliner  -LR     Post Treatment Position chair  -ST bed  -LR     In Bed  -- notified nsg;supine;call light within reach;encouraged to call for assist;exit alarm on;side rails up x3  -LR     In Chair notified nsg;reclined;call light within reach;encouraged to call for assist;exit alarm on  -ST  --     Recorded by [ST] Brea Miles, OTR 07/02/18 1434 [LR] Adrianne Fernandez, PT 07/02/18 1016     Row Name 07/02/18 0920             Pain Assessment    Additional Documentation Pain Scale: FACES Pre/Post-Treatment (Group)  -LR      Recorded by [LR] Adrianne Fernandez, PT 07/02/18 1016      Row Name 07/02/18 0928             Pain Scale: Numbers Pre/Post-Treatment    Pain Scale: Numbers, Pretreatment 2/10  -ST      Pain Scale: Numbers, Post-Treatment 4/10  -ST      Pain Location back  -ST      Pain Intervention(s) Repositioned  -ST      Recorded by [ST] Brea Miles, OTR 07/02/18 1434      Row Name 07/02/18 0920             Pain Scale: FACES Pre/Post-Treatment    Pain: FACES Scale, Pretreatment 0-->no hurt  -LR      Pain: FACES Scale, Post-Treatment 2-->hurts little bit  -LR      Recorded by [LR] Adrianne Fernandez, PT 07/02/18 1016      Row Name 07/02/18 0920             Sensory Assessment/Intervention    Sensory General Assessment --   denies numbness/tingling  -LR      Recorded by [LR] Adrianne Fernandez, PT 07/02/18 1016      Row Name                Wound 06/22/18 1347 Other (See comments) back incision    Wound - Properties Group Date first assessed: 06/22/18 [KS] Time first assessed: 1347 [KS] Side: Other (See comments) [KS] Location: back [KS] Type: incision [KS] Recorded by:  [KS] Hemalatha Gomez RN 06/22/18 1347    Row Name                Wound 06/22/18 1341  Other (See comments) back incision    Wound - Properties Group Date first assessed: 06/22/18 [KS] Time first assessed: 1347 [KS] Side: Other (See comments) [KS] Location: back [KS] Type: incision [KS] Recorded by:  [KS] Hemalatha Gomez RN 06/22/18 1347    Row Name 07/02/18 0920             Coping    Observed Emotional State accepting;cooperative  -LR      Verbalized Emotional State acceptance  -LR      Recorded by [LR] Adrianne Fernandez, PT 07/02/18 1016      Row Name 07/02/18 0920             Plan of Care Review    Plan of Care Reviewed With patient  -LR      Recorded by [LR] Adrianne Fernandez, PT 07/02/18 1016      Row Name 07/02/18 0920             Outcome Summary/Treatment Plan (PT)    Daily Summary of Progress (PT) progress toward functional goals is good  -LR      Recorded by [LR] Adrianne Fernandez, PT 07/02/18 1016        User Key  (r) = Recorded By, (t) = Taken By, (c) = Cosigned By    Initials Name Effective Dates Discipline    ST Brea Miles, OTR 06/10/18 -  OT    LR Adrianne Fernandez, PT 06/19/15 -  PT    KS Hemalatha Gomez RN 05/09/17 -  Nurse        Wound 06/22/18 1347 Other (See comments) back incision (Active)   Dressing Appearance dry;intact;no drainage 7/2/2018 12:16 PM   Closure Adhesive closure strips 7/1/2018  5:40 PM   Base pink 7/1/2018  5:40 PM   Periwound dry;intact;pink 7/1/2018  5:40 PM   Periwound Temperature warm 7/1/2018  5:40 PM   Edges rolled/closed 7/1/2018  5:40 PM   Drainage Characteristics/Odor serous 7/1/2018  5:40 PM   Drainage Amount none 7/2/2018  8:00 AM   Care, Wound cleansed with;sterile normal saline 7/1/2018  5:40 PM   Dressing Care, Wound border dressing 7/2/2018  8:00 AM   Periwound Care, Wound dry periwound area maintained 7/1/2018  5:40 PM       Wound 06/22/18 1347 Other (See comments) back incision (Active)   Dressing Appearance dry;intact;no drainage 7/2/2018  8:00 AM   Drainage Amount none 7/2/2018  8:00 AM   Dressing Care, Wound border  dressing 7/2/2018  8:00 AM         Occupational Therapy Education     Title: PT OT SLP Therapies (Active)     Topic: Occupational Therapy (Done)     Point: ADL training (Done)     Description: Instruct learner(s) on proper safety adaptation and remediation techniques during self care or transfers.   Instruct in proper use of assistive devices.   Learning Progress Summary     Learner Status Readiness Method Response Comment Documented by    Patient Done Acceptance E,TB,D VU,DU spinal precautions, donning brace and reinforcement for wear schedule, ADLs, balance, rwx safety, transfers ST 07/02/18 1434     Active Acceptance E NR  MC 07/01/18 1503     Active Acceptance D,E NR log roll, LSO needs, ADLs, transfers, bed mobility, feeding ST 06/28/18 1524     Active Acceptance E,D NR spinal precautions, command following, grooming, HEP, orientation ST 06/26/18 1508     Active Acceptance E,D,TB NR LB dressing ADL retraining with AE of sock aid and reacher, requires reinforcement did demonstrate learning  06/25/18 1144     Done Acceptance TERELLTB,H,CARINA PETERS,JOSE MANUEL role of OT, benefits of activity, spinal precautions, LBD/AE, transfers, rwx use and safety, balance, brace and wear schedule ST 06/23/18 1621          Point: Home exercise program (Done)     Description: Instruct learner(s) on appropriate technique for monitoring, assisting and/or progressing therapeutic exercises/activities.   Learning Progress Summary     Learner Status Readiness Method Response Comment Documented by    Patient Done Acceptance E,TB,D VU,DU spinal precautions, donning brace and reinforcement for wear schedule, ADLs, balance, rwx safety, transfers ST 07/02/18 1434     Active Acceptance D,E NR log roll, LSO needs, ADLs, transfers, bed mobility, feeding ST 06/28/18 1524     Active Acceptance E,D NR spinal precautions, command following, grooming, HEP, orientation ST 06/26/18 1508     Done Acceptance ETB,H,D VU,DU role of OT, benefits of activity, spinal  precautions, LBD/AE, transfers, rwx use and safety, balance, brace and wear schedule  06/23/18 1621          Point: Precautions (Done)     Description: Instruct learner(s) on prescribed precautions during self-care and functional transfers.   Learning Progress Summary     Learner Status Readiness Method Response Comment Documented by    Patient Done Acceptance E,TB,D VU,DU spinal precautions, donning brace and reinforcement for wear schedule, ADLs, balance, rwx safety, transfers  07/02/18 1434     Active Acceptance E NR   07/01/18 1503     Active Acceptance D,E NR log roll, LSO needs, ADLs, transfers, bed mobility, feeding  06/28/18 1524     Active Acceptance E,D,TB NR LB dressing ADL retraining with AE of sock aid and reacher, requires reinforcement did demonstrate learning  06/25/18 1144     Done Acceptance E,TB,H,D VU,DU role of OT, benefits of activity, spinal precautions, LBD/AE, transfers, rwx use and safety, balance, brace and wear schedule  06/23/18 1621          Point: Body mechanics (Done)     Description: Instruct learner(s) on proper positioning and spine alignment during self-care, functional mobility activities and/or exercises.   Learning Progress Summary     Learner Status Readiness Method Response Comment Documented by    Patient Done Acceptance E,TB,CARINA VU,DU spinal precautions, donning brace and reinforcement for wear schedule, ADLs, balance, rwx safety, transfers  07/02/18 1434     Active Acceptance E NR   07/01/18 1503     Active Acceptance D,E NR log roll, LSO needs, ADLs, transfers, bed mobility, feeding  06/28/18 1524     Active Acceptance E,D,TB NR LB dressing ADL retraining with AE of sock aid and reacher, requires reinforcement did demonstrate learning  06/25/18 1144     Done Acceptance E,TB,H,D VU,DU role of OT, benefits of activity, spinal precautions, LBD/AE, transfers, rwx use and safety, balance, brace and wear schedule  06/23/18 1621                      User Key      Initials Effective Dates Name Provider Type Discipline     06/10/18 -  Brea Miles, OTR Occupational Therapist OT    MC 03/14/16 -  Coco Monahan, OT Occupational Therapist OT    KF 04/03/18 -  Ashley Atwood, OT Occupational Therapist OT                OT Recommendation and Plan  Outcome Summary/Treatment Plan (OT)  Anticipated Equipment Needs at Discharge (OT): tub bench, raised toilet seat  Anticipated Discharge Disposition (OT): inpatient rehabilitation facility, skilled nursing facility  Planned Therapy Interventions (OT Eval): adaptive equipment training, functional balance retraining, occupation/activity based interventions, patient/caregiver education/training, strengthening exercise, transfer/mobility retraining  Therapy Frequency (OT Eval): daily  Plan of Care Review  Plan of Care Reviewed With: patient  Plan of Care Reviewed With: patient  Outcome Summary: Pt with improved mentation, demonstrating ability to recall spinal precautions along with being CGA for sit/stand transfers and mobilty in room using rwx. Pt SUA for ADL tasks and adjusting socks. Will continue to progress with ADL tasks and safety in preparation for d/c home.         Outcome Measures     Row Name 07/02/18 0928 07/02/18 0920 07/01/18 1511       How much help from another person do you currently need...    Turning from your back to your side while in flat bed without using bedrails?  -- 3  -LR 3  -KR    Moving from lying on back to sitting on the side of a flat bed without bedrails?  -- 3  -LR 3  -KR    Moving to and from a bed to a chair (including a wheelchair)?  -- 3  -LR 3  -KR    Standing up from a chair using your arms (e.g., wheelchair, bedside chair)?  -- 3  -LR 3  -KR    Climbing 3-5 steps with a railing?  -- 2  -LR 2  -KR    To walk in hospital room?  -- 3  -LR 3  -KR    AM-PAC 6 Clicks Score  -- 17  -LR 17  -KR       How much help from another is currently needed...    Putting on and taking off regular lower body  clothing? 3  -ST  --  --    Bathing (including washing, rinsing, and drying) 3  -ST  --  --    Toileting (which includes using toilet bed pan or urinal) 2  -ST  --  --    Putting on and taking off regular upper body clothing 3  -ST  --  --    Taking care of personal grooming (such as brushing teeth) 4  -ST  --  --    Eating meals 4  -ST  --  --    Score 19  -ST  --  --       Functional Assessment    Outcome Measure Options  -- AM-PAC 6 Clicks Basic Mobility (PT)  -LR AM-[facility] 6 Clicks Basic Mobility (PT)  -    Row Name 07/01/18 1440 06/30/18 1040          How much help from another person do you currently need...    Turning from your back to your side while in flat bed without using bedrails?  -- 2  -JM     Moving from lying on back to sitting on the side of a flat bed without bedrails?  -- 2  -JM     Moving to and from a bed to a chair (including a wheelchair)?  -- 3  -JM     Standing up from a chair using your arms (e.g., wheelchair, bedside chair)?  -- 3  -JM     Climbing 3-5 steps with a railing?  -- 2  -JM     To walk in hospital room?  -- 3  -JM     AM-PAC 6 Clicks Score  -- 15  -JM        How much help from another is currently needed...    Putting on and taking off regular lower body clothing? 2  -MC  --     Bathing (including washing, rinsing, and drying) 2  -MC  --     Toileting (which includes using toilet bed pan or urinal) 2  -MC  --     Putting on and taking off regular upper body clothing 2  -MC  --     Taking care of personal grooming (such as brushing teeth) 2  -MC  --     Eating meals 2  -MC  --     Score 12  -MC  --        Functional Assessment    Outcome Measure Options AM-PAC 6 Clicks Daily Activity (OT)  -[facility] 6 Clicks Basic Mobility (PT)  -KEVIN       User Key  (r) = Recorded By, (t) = Taken By, (c) = Cosigned By    Initials Name Provider Type     Brea Miles, OTR Occupational Therapist    KEVIN Lizama, PT Physical Therapist    LR Adrianne Fernandez, PT Physical Therapist    AMANDA  Coco Monahan, OT Occupational Therapist    DIEGO Cheek, PT Physical Therapist           Time Calculation:         Time Calculation- OT     Row Name 07/02/18 0928 07/02/18 0920          Time Calculation- OT    OT Start Time 0928 -ST  --     OT Received On 07/02/18 -ST  --     OT Goal Re-Cert Due Date 07/03/18 -ST  --        Timed Charges    12442 - Gait Training Minutes   -- 15  -LR     00907 - OT Therapeutic Activity Minutes 12  -ST  --     05405 - OT Self Care/Mgmt Minutes 15  -ST  --       User Key  (r) = Recorded By, (t) = Taken By, (c) = Cosigned By    Initials Name Provider Type    ST Brea Miles OTR Occupational Therapist    LR Adrianne Fernandez, PT Physical Therapist           Therapy Suggested Charges     Code   Minutes Charges    85486 (CPT®) Hc Ot Neuromusc Re Education Ea 15 Min      98599 (CPT®) Hc Ot Ther Proc Ea 15 Min      40635 (CPT®) Hc Gait Training Ea 15 Min      92841 (CPT®) Hc Ot Therapeutic Act Ea 15 Min 12 1    69339 (CPT®) Hc Ot Manual Therapy Ea 15 Min      84011 (CPT®) Hc Ot Iontophoresis Ea 15 Min      63308 (CPT®) Hc Ot Elec Stim Ea-Per 15 Min      52477 (CPT®) Hc Ot Ultrasound Ea 15 Min      81127 (CPT®) Hc Ot Self Care/Mgmt/Train Ea 15 Min 15 1    Total  27 2        Therapy Charges for Today     Code Description Service Date Service Provider Modifiers Qty    11172153581 HC OT THERAPEUTIC ACT EA 15 MIN 7/2/2018 CHARLOTTE Reynaga GO 1    26990723155 HC OT SELF CARE/MGMT/TRAIN EA 15 MIN 7/2/2018 Brea Miles OTR GO 1          OT G-codes  OT Professional Judgement Used?: Yes  OT Functional Scales Options: AM-PAC 6 Clicks Daily Activity (OT)  Score: 12  Functional Limitation: Self care  Self Care Current Status (): At least 60 percent but less than 80 percent impaired, limited or restricted  Self Care Goal Status (): At least 40 percent but less than 60 percent impaired, limited or restricted    CHARLOTTE Day  7/2/2018

## 2018-07-03 LAB
ANION GAP SERPL CALCULATED.3IONS-SCNC: 10 MMOL/L (ref 3–11)
BUN BLD-MCNC: 16 MG/DL (ref 9–23)
BUN/CREAT SERPL: 18.4 (ref 7–25)
CALCIUM SPEC-SCNC: 9.7 MG/DL (ref 8.7–10.4)
CHLORIDE SERPL-SCNC: 100 MMOL/L (ref 99–109)
CO2 SERPL-SCNC: 26 MMOL/L (ref 20–31)
CREAT BLD-MCNC: 0.87 MG/DL (ref 0.6–1.3)
DEPRECATED RDW RBC AUTO: 51.4 FL (ref 37–54)
ERYTHROCYTE [DISTWIDTH] IN BLOOD BY AUTOMATED COUNT: 15 % (ref 11.3–14.5)
GFR SERPL CREATININE-BSD FRML MDRD: 86 ML/MIN/1.73
GLUCOSE BLD-MCNC: 187 MG/DL (ref 70–100)
GLUCOSE BLDC GLUCOMTR-MCNC: 161 MG/DL (ref 70–130)
GLUCOSE BLDC GLUCOMTR-MCNC: 202 MG/DL (ref 70–130)
GLUCOSE BLDC GLUCOMTR-MCNC: 203 MG/DL (ref 70–130)
GLUCOSE BLDC GLUCOMTR-MCNC: 240 MG/DL (ref 70–130)
HCT VFR BLD AUTO: 32.7 % (ref 38.9–50.9)
HGB BLD-MCNC: 10.7 G/DL (ref 13.1–17.5)
MCH RBC QN AUTO: 30.7 PG (ref 27–31)
MCHC RBC AUTO-ENTMCNC: 32.7 G/DL (ref 32–36)
MCV RBC AUTO: 93.7 FL (ref 80–99)
PLATELET # BLD AUTO: 476 10*3/MM3 (ref 150–450)
PMV BLD AUTO: 9.1 FL (ref 6–12)
POTASSIUM BLD-SCNC: 3.6 MMOL/L (ref 3.5–5.5)
RBC # BLD AUTO: 3.49 10*6/MM3 (ref 4.2–5.76)
SODIUM BLD-SCNC: 136 MMOL/L (ref 132–146)
TSH SERPL DL<=0.05 MIU/L-ACNC: 1.45 MIU/ML (ref 0.35–5.35)
WBC NRBC COR # BLD: 10.01 10*3/MM3 (ref 3.5–10.8)

## 2018-07-03 PROCEDURE — 97535 SELF CARE MNGMENT TRAINING: CPT | Performed by: OCCUPATIONAL THERAPIST

## 2018-07-03 PROCEDURE — 94799 UNLISTED PULMONARY SVC/PX: CPT

## 2018-07-03 PROCEDURE — 85027 COMPLETE CBC AUTOMATED: CPT | Performed by: NURSE PRACTITIONER

## 2018-07-03 PROCEDURE — 84443 ASSAY THYROID STIM HORMONE: CPT | Performed by: NURSE PRACTITIONER

## 2018-07-03 PROCEDURE — 99024 POSTOP FOLLOW-UP VISIT: CPT | Performed by: PHYSICIAN ASSISTANT

## 2018-07-03 PROCEDURE — 99232 SBSQ HOSP IP/OBS MODERATE 35: CPT | Performed by: NURSE PRACTITIONER

## 2018-07-03 PROCEDURE — 63710000001 FLINTSTONES COMPLETE 60 MG CHEWABLE TABLET: Performed by: NEUROLOGICAL SURGERY

## 2018-07-03 PROCEDURE — 82962 GLUCOSE BLOOD TEST: CPT

## 2018-07-03 PROCEDURE — 97110 THERAPEUTIC EXERCISES: CPT

## 2018-07-03 PROCEDURE — 80048 BASIC METABOLIC PNL TOTAL CA: CPT | Performed by: NURSE PRACTITIONER

## 2018-07-03 PROCEDURE — 63710000001 INSULIN DETEMIR PER 5 UNITS: Performed by: NURSE PRACTITIONER

## 2018-07-03 PROCEDURE — 97116 GAIT TRAINING THERAPY: CPT

## 2018-07-03 RX ORDER — CARVEDILOL 6.25 MG/1
6.25 TABLET ORAL 2 TIMES DAILY WITH MEALS
Status: DISCONTINUED | OUTPATIENT
Start: 2018-07-03 | End: 2018-07-05 | Stop reason: HOSPADM

## 2018-07-03 RX ORDER — OXYCODONE HYDROCHLORIDE AND ACETAMINOPHEN 5; 325 MG/1; MG/1
1 TABLET ORAL EVERY 4 HOURS PRN
Status: DISCONTINUED | OUTPATIENT
Start: 2018-07-03 | End: 2018-07-05 | Stop reason: HOSPADM

## 2018-07-03 RX ADMIN — INSULIN LISPRO 2 UNITS: 100 INJECTION, SOLUTION INTRAVENOUS; SUBCUTANEOUS at 20:56

## 2018-07-03 RX ADMIN — LABETALOL HYDROCHLORIDE 10 MG: 5 INJECTION INTRAVENOUS at 11:19

## 2018-07-03 RX ADMIN — IPRATROPIUM BROMIDE 2 SPRAY: 42 SPRAY NASAL at 19:58

## 2018-07-03 RX ADMIN — ACETAMINOPHEN 650 MG: 325 TABLET ORAL at 20:01

## 2018-07-03 RX ADMIN — GABAPENTIN 400 MG: 400 CAPSULE ORAL at 19:54

## 2018-07-03 RX ADMIN — SENNOSIDES AND DOCUSATE SODIUM 1 TABLET: 8.6; 5 TABLET ORAL at 19:54

## 2018-07-03 RX ADMIN — FAMOTIDINE 20 MG: 20 TABLET ORAL at 19:53

## 2018-07-03 RX ADMIN — FAMOTIDINE 20 MG: 20 TABLET ORAL at 08:06

## 2018-07-03 RX ADMIN — OXYCODONE HYDROCHLORIDE AND ACETAMINOPHEN 1 TABLET: 5; 325 TABLET ORAL at 18:50

## 2018-07-03 RX ADMIN — ACETAMINOPHEN 650 MG: 325 TABLET ORAL at 08:07

## 2018-07-03 RX ADMIN — SERTRALINE HYDROCHLORIDE 100 MG: 100 TABLET ORAL at 08:06

## 2018-07-03 RX ADMIN — LABETALOL HYDROCHLORIDE 10 MG: 5 INJECTION INTRAVENOUS at 04:36

## 2018-07-03 RX ADMIN — Medication 1 TABLET: at 08:06

## 2018-07-03 RX ADMIN — GABAPENTIN 300 MG: 300 CAPSULE ORAL at 08:07

## 2018-07-03 RX ADMIN — TAMSULOSIN HYDROCHLORIDE 0.4 MG: 0.4 CAPSULE ORAL at 08:07

## 2018-07-03 RX ADMIN — INSULIN LISPRO 3 UNITS: 100 INJECTION, SOLUTION INTRAVENOUS; SUBCUTANEOUS at 11:18

## 2018-07-03 RX ADMIN — SENNOSIDES AND DOCUSATE SODIUM 1 TABLET: 8.6; 5 TABLET ORAL at 08:07

## 2018-07-03 RX ADMIN — INSULIN DETEMIR 8 UNITS: 100 INJECTION, SOLUTION SUBCUTANEOUS at 08:11

## 2018-07-03 RX ADMIN — BISACODYL 10 MG: 5 TABLET, COATED ORAL at 08:06

## 2018-07-03 RX ADMIN — HALOPERIDOL 5 MG: 5 TABLET ORAL at 06:11

## 2018-07-03 RX ADMIN — OXYCODONE HYDROCHLORIDE AND ACETAMINOPHEN 1 TABLET: 5; 325 TABLET ORAL at 04:01

## 2018-07-03 RX ADMIN — FINASTERIDE 5 MG: 5 TABLET, FILM COATED ORAL at 08:07

## 2018-07-03 RX ADMIN — INSULIN LISPRO 3 UNITS: 100 INJECTION, SOLUTION INTRAVENOUS; SUBCUTANEOUS at 17:06

## 2018-07-03 RX ADMIN — PANTOPRAZOLE SODIUM 40 MG: 40 TABLET, DELAYED RELEASE ORAL at 08:06

## 2018-07-03 RX ADMIN — ATORVASTATIN CALCIUM 10 MG: 10 TABLET, FILM COATED ORAL at 08:06

## 2018-07-03 RX ADMIN — FUROSEMIDE 20 MG: 40 TABLET ORAL at 08:06

## 2018-07-03 RX ADMIN — TAMSULOSIN HYDROCHLORIDE 0.4 MG: 0.4 CAPSULE ORAL at 00:58

## 2018-07-03 RX ADMIN — PANTOPRAZOLE SODIUM 40 MG: 40 TABLET, DELAYED RELEASE ORAL at 19:53

## 2018-07-03 RX ADMIN — INSULIN LISPRO 3 UNITS: 100 INJECTION, SOLUTION INTRAVENOUS; SUBCUTANEOUS at 08:12

## 2018-07-03 RX ADMIN — OXYBUTYNIN CHLORIDE 5 MG: 5 TABLET ORAL at 19:53

## 2018-07-03 RX ADMIN — CARVEDILOL 3.12 MG: 3.12 TABLET, FILM COATED ORAL at 08:06

## 2018-07-03 RX ADMIN — TAMSULOSIN HYDROCHLORIDE 0.4 MG: 0.4 CAPSULE ORAL at 19:54

## 2018-07-03 RX ADMIN — CARVEDILOL 6.25 MG: 6.25 TABLET, FILM COATED ORAL at 17:06

## 2018-07-03 RX ADMIN — OXYCODONE HYDROCHLORIDE AND ACETAMINOPHEN 1 TABLET: 5; 325 TABLET ORAL at 10:30

## 2018-07-03 RX ADMIN — SERTRALINE HYDROCHLORIDE 150 MG: 100 TABLET ORAL at 19:54

## 2018-07-03 RX ADMIN — IPRATROPIUM BROMIDE 2 SPRAY: 42 SPRAY NASAL at 13:08

## 2018-07-03 RX ADMIN — LISINOPRIL 40 MG: 40 TABLET ORAL at 08:07

## 2018-07-03 NOTE — PLAN OF CARE
Problem: Patient Care Overview  Goal: Plan of Care Review   07/03/18 0559   Coping/Psychosocial   Plan of Care Reviewed With patient   Plan of Care Review   Progress improving   OTHER   Outcome Summary Pt had an episode of HTN relieved with one dose of IV labetalol. Patient up to restroom throughout shift w/ back brace on, NAD noted. Pt was A&Ox2-3. Pt remained polite to staff. Pain controlled with PO meds.        Problem: Fall Risk (Adult)  Goal: Identify Related Risk Factors and Signs and Symptoms  Outcome: Ongoing (interventions implemented as appropriate)      Problem: Laminectomy/Foraminotomy/Discectomy (Adult)  Goal: Signs and Symptoms of Listed Potential Problems Will be Absent, Minimized or Managed (Laminectomy/Foraminotomy/Discectomy)  Outcome: Ongoing (interventions implemented as appropriate)      Problem: Pain, Acute (Adult)  Goal: Identify Related Risk Factors and Signs and Symptoms  Outcome: Ongoing (interventions implemented as appropriate)    Goal: Acceptable Pain Control/Comfort Level  Outcome: Ongoing (interventions implemented as appropriate)      Problem: Skin Injury Risk (Adult)  Goal: Identify Related Risk Factors and Signs and Symptoms  Outcome: Ongoing (interventions implemented as appropriate)    Goal: Skin Health and Integrity  Outcome: Ongoing (interventions implemented as appropriate)

## 2018-07-03 NOTE — THERAPY TREATMENT NOTE
Acute Care - Occupational Therapy Treatment Note  [facility]     Patient Name: Sai Main  : 1945  MRN: [id number]  Today's Date: 7/3/2018  Onset of Illness/Injury or Date of Surgery: 18  Date of Referral to OT: 18  Referring Physician: MD Pepe    Admit Date: 2018       ICD-10-CM ICD-9-CM   1. Impaired functional mobility, balance, gait, and endurance Z74.09 V49.89   2. Impaired mobility and ADLs Z74.09 799.89     Patient Active Problem List   Diagnosis   • Episode of change in speech   • Spinal stenosis of lumbar region with neurogenic claudication   • Radicular pain of both lower extremities   • Chronic bilateral low back pain with bilateral sciatica   • DDD (degenerative disc disease), lumbar   • Spondylosis of lumbar region without myelopathy or radiculopathy   • Congestive heart failure (CMS/[facility])   • IDDM (insulin dependent diabetes mellitus) (CMS/[facility])   • Insomnia   • Physical deconditioning   • Anxiety and depression   • At high risk for falls   • Gait disturbance   • Diabetic polyneuropathy associated with type 1 diabetes mellitus (CMS/[facility])   • Left foot drop   • Moderate obesity   • Spondylosis with myelopathy, lumbar region   • Spinal stenosis, lumbar region, with neurogenic claudication   • Degenerative disc disease, lumbar   • Osteoarthritis of spine with radiculopathy, lumbar region   • Spondylosis   • Impaired functional mobility, balance, gait, and endurance   • Metabolic encephalopathy   • AYE (acute kidney injury) (CMS/[facility])   • Anemia   • Hypotension   • Hypoglycemia     Past Medical History:   Diagnosis Date   • Anemia    • Arthritis    • Bleeding disorder (CMS/[facility])    • CHF (congestive heart failure) (CMS/[facility])    • DDD (degenerative disc disease), lumbar    • Diabetes mellitus (CMS/[facility])    • DJD (degenerative joint disease), lumbar    • Foot drop    • Heart murmur    • Hernia    • History of transfusion    • Hypertension    • Low back pain    • Neurogenic  claudication due to lumbar spinal stenosis    • Radiculopathy with lower extremity symptoms    • Spinal stenosis of lumbar region 2014   • Wears dentures     upper     Past Surgical History:   Procedure Laterality Date   • BUNIONECTOMY     • CARDIAC CATHETERIZATION      2 STENTS   • CARDIAC SURGERY      one blockage   • CERVICAL SPINE SURGERY     • COLONOSCOPY     • CORONARY ARTERY BYPASS GRAFT      Sees Dr. Scott @ [facility]   • ENDOSCOPY     • EYE SURGERY      with lens   • LUMBAR LAMINECTOMY DISCECTOMY DECOMPRESSION N/A 6/22/2018    Procedure: LUMBAR LAMINECTOMY L2-5;  Surgeon: Jose Cantu MD;  Location: [facility];  Service: Neurosurgery   • SKIN BIOPSY         Therapy Treatment          Rehabilitation Treatment Summary     Row Name 07/03/18 1433 07/03/18 1025          Treatment Time/Intention    Discipline occupational therapist  -ST physical therapist  -VG     Document Type therapy note (daily note)  -ST therapy note (daily note)  -VG     Subjective Information complains of;fatigue;weakness   c/o abd. pain  -ST complains of;fatigue;pain  -VG     Mode of Treatment individual therapy;occupational therapy  -ST individual therapy;physical therapy  -VG     Patient/Family Observations supine; no family present  -ST UIC, chair check, LSO donned; no family present.  -VG     Care Plan Review care plan/treatment goals reviewed;risks/benefits reviewed;current/potential barriers reviewed;patient/other agree to care plan  -ST patient/other agree to care plan  -VG     Therapy Frequency (PT Clinical Impression)  -- daily  -VG     Therapy Frequency (OT Eval) daily  -ST  --     Patient Effort adequate  -ST adequate  -VG     Existing Precautions/Restrictions fall;spinal;brace worn when out of bed  -ST fall;spinal;brace worn when out of bed  -VG     Recorded by [ST] Brea Miles, OTR 07/03/18 1515 [VG] Margi Lauren, PT 07/03/18 1114     Row Name 07/03/18 1025             Vital Signs    Pre Systolic BP Rehab 169   -VG      Pre Treatment Diastolic BP 87  -VG      Post Systolic BP Rehab 179  -VG      Post Treatment Diastolic BP 92  -VG      Pre Patient Position Sitting  -VG      Post Patient Position Supine  -VG      Recorded by [VG] Margi Lauren, PT 07/03/18 1114      Row Name 07/03/18 1433 07/03/18 1025          Cognitive Assessment/Intervention- PT/OT    Affect/Mental Status (Cognitive) WNL  -ST WNL  -VG     Behavioral Issues (Cognitive) --   recalls all spinal precautions w/o prompting  -ST  --     Orientation Status (Cognition) oriented x 4  -ST oriented x 4  -VG     Follows Commands (Cognition) follows one step commands;over 90% accuracy  -ST follows one step commands;75-90% accuracy;verbal cues/prompting required;delayed response/completion;increased processing time needed;initiation impaired  -VG     Cognitive Function (Cognitive)  -- attention deficit;memory deficit;safety deficit  -VG     Attention Deficit (Cognitive) mild deficit  -ST moderate deficit;requires cues/redirection to task  -VG     Executive Function Deficit (Cognition)  -- moderate deficit;organization/sequencing;planning/decision making;problem solving/reasoning  -VG     Memory Deficit (Cognitive) mild deficit  -ST severe deficit;short term memory;working memory  -VG     Safety Deficit (Cognitive)  -- moderate deficit;safety precautions awareness;safety precautions follow-through/compliance;problem solving;ability to follow commands  -VG     Personal Safety Interventions fall prevention program maintained;gait belt;nonskid shoes/slippers when out of bed  -ST fall prevention program maintained;gait belt;nonskid shoes/slippers when out of bed;supervised activity  -VG     Recorded by [ST] Brea Miles, OTR 07/03/18 1515 [VG] Margi Lauren, PT 07/03/18 1114     Row Name 07/03/18 1433 07/03/18 1025          Safety Issues, Functional Mobility    Safety Issues Affecting Function (Mobility)  -- safety precautions follow-through/compliance;safety  precaution awareness;sequencing abilities;positioning of assistive device  -VG     Impairments Affecting Function (Mobility) strength  -ST balance;cognition;pain;strength;endurance/activity tolerance  -VG     Recorded by [ST] Brea Miles, OTR 07/03/18 1515 [VG] Margi Lauren, PT 07/03/18 1114     Row Name 07/03/18 1433 07/03/18 1025          Bed Mobility Assessment/Treatment    Bed Mobility Assessment/Treatment  -- sit-supine;scooting/bridging  -VG     Scooting/Bridging [place] (Bed Mobility)  -- minimum assist (75% patient effort)  -VG     Supine-Sit [place] (Bed Mobility) contact guard  -ST  --     Sit-Supine [place] (Bed Mobility) minimum assist (75% patient effort)  -ST verbal cues;moderate assist (50% patient effort);1 person assist  -VG     Bed Mobility, Safety Issues  -- decreased use of arms for pushing/pulling;decreased use of legs for bridging/pushing  -VG     Assistive Device (Bed Mobility)  -- bed rails  -VG     Comment (Bed Mobility) cuing for log roll technique   -ST Cues for log roll. LSO doffed in sitting EOB. Increased amount of time required for all mobility d/t fatigue.  -VG     Recorded by [ST] Brea Miles, OTR 07/03/18 1515 [VG] Margi Lauren, PT 07/03/18 1114     Row Name 07/03/18 1433             Functional Mobility    Functional Mobility- Comment refused   -ST      Recorded by [ST] Brea Miles, OTR 07/03/18 1515      Row Name 07/03/18 1433 07/03/18 1025          Transfer Assessment/Treatment    Transfer Assessment/Treatment  -- sit-stand transfer;stand-sit transfer;toilet transfer  -VG     Comment (Transfers) refused   -ST VC's for backing up to transfer surface, hand placement, maintaining spinal prec, and AD placement. Increased amount of time required for all mobility d/t fatigue. Attempted toileting, no void, no BM.  -VG     Recorded by [ST] Brea Miles, OTR 07/03/18 1515 [VG] Margi Lauren, PT 07/03/18 1114     Row Name 07/03/18 1025              Sit-Stand Transfer    Sit-Stand [place] (Transfers) contact guard;verbal cues  -VG      Assistive Device (Sit-Stand Transfers) walker, front-wheeled  -VG      Recorded by [VG] Margi Lauren, PT 07/03/18 1114      Row Name 07/03/18 1025             Stand-Sit Transfer    Stand-Sit [place] (Transfers) contact guard;verbal cues  -VG      Assistive Device (Stand-Sit Transfers) walker, front-wheeled  -VG      Recorded by [VG] Margi Lauren, PT 07/03/18 1114      Row Name 07/03/18 1025             Toilet Transfer    Type (Toilet Transfer) sit-stand;stand-sit  -VG      [place] Level (Toilet Transfer) contact guard;verbal cues  -VG      Assistive Device (Toilet Transfer) raised toilet seat;grab bars/safety frame;walker, front-wheeled  -VG      Recorded by [VG] Margi Lauren, PT 07/03/18 1114      Row Name 07/03/18 1025             Gait/Stairs Assessment/Training    95012 - Gait Training Minutes  25  -VG      Gait/Stairs Assessment/Training gait/ambulation independence;gait/ambulation assistive device;distance ambulated;gait pattern;gait deviations;maintains weight-bearing status  -VG      [place] Level (Gait) contact guard;verbal cues  -VG      Assistive Device (Gait) walker, front-wheeled  -VG      Distance in Feet (Gait) 40  -VG      Pattern (Gait) step-through;step-to  -VG      Deviations/Abnormal Patterns (Gait) bilateral deviations;jackie decreased;gait speed decreased;stride length decreased  -VG      Bilateral Gait Deviations forward flexed posture;heel strike decreased  -VG      Comment (Gait/Stairs) Pt initially declined ambulation d/t fatigue. Agreed to walk to bathroom. Amb 40 ft with CGA and RW, distance limited by fatigue. Cues for keeping body inside RW frame and inc wbing thru BUEs d/t pain in R ankle. Increased amount of time required for all mobility d/t fatigue.  -VG      Recorded by [VG] Margi Lauren, PT 07/03/18 1114      Row Name 07/03/18 1433             ADL  Assessment/Intervention    46001 - OT Self Care/Mgmt Minutes 16  -ST      Recorded by [ST] Brea Miles, OTR 07/03/18 1515      Row Name 07/03/18 1433             Lower Body Dressing Assessment/Training    Lower Body Dressing [place] Level doff;don;pants/bottoms;supervision  -ST      Assistive Devices (Lower Body Dressing) reacher  -ST      Lower Body Dressing Position edge of bed sitting  -ST      Comment (Lower Body Dressing) cuing for spinal precautions  -ST      Recorded by [ST] Brea Miles, OTR 07/03/18 1515      Row Name 07/03/18 1025             Therapeutic Exercise    73255 - PT Therapeutic Exercise Minutes 13  -VG      Recorded by [VG] Margi Lauren, PT 07/03/18 1114      Row Name 07/03/18 1025             Therapeutic Exercise    Lower Extremity (Therapeutic Exercise) gluteal sets;quad sets, bilateral  -VG      Lower Extremity Range of Motion (Therapeutic Exercise) hip internal/external rotation, bilateral;ankle dorsiflexion/plantar flexion, bilateral  -VG      Core Strength (Therapeutic Exercise) abdominal bracing  -VG      Exercise Type (Therapeutic Exercise) AROM (active range of motion)  -VG      Position (Therapeutic Exercise) supine  -VG      Sets/Reps (Therapeutic Exercise) 10x  -VG      Comment (Therapeutic Exercise) VC's and CGA required for mechanics, pt fatigued with ther ex, requiring cues for attention to task. Per spinal protocol.  -VG      Recorded by [VG] Margi Lauren, PT 07/03/18 1114      Row Name 07/03/18 1433             Dynamic Sitting Balance    Level of [place], Reaches Outside Midline (Sitting, Dynamic Balance) independent  -ST      Sitting Position, Reaches Outside Midline (Sitting, Dynamic Balance) sitting on edge of bed  -ST      Recorded by [ST] Brea Miles, OTR 07/03/18 1515      Row Name 07/03/18 1025             Positioning and Restraints    Pre-Treatment Position sitting in chair/recliner  -VG      Post Treatment Position bed  -VG      In Bed  supine;call light within reach;encouraged to call for assist;exit alarm on;side rails up x2  -VG      Recorded by [VG] Margi Lauren, PT 07/03/18 1114      Row Name 07/03/18 1433 07/03/18 1025          Pain Scale: Numbers Pre/Post-Treatment    Pain Scale: Numbers, Pretreatment 4/10  -ST 7/10  -VG     Pain Scale: Numbers, Post-Treatment 4/10  -ST 7/10  -VG     Pain Location - Orientation  -- lower  -VG     Pain Location back  -ST back  -VG     Pain Intervention(s) --   notified RN of abd. pain  -ST Repositioned;Ambulation/increased activity   Nsg administered pain med prior to session.  -VG     Recorded by [ST] Brea Miles, OTR 07/03/18 1515 [VG] Margi Lauren, PT 07/03/18 1114     Row Name                Wound 06/22/18 1347 Other (See comments) back incision    Wound - Properties Group Date first assessed: 06/22/18 [KS] Time first assessed: 1347 [KS] Side: Other (See comments) [KS] Location: back [KS] Type: incision [KS] Recorded by:  [KS] Hemalatha Gomez RN 06/22/18 1347    Row Name                Wound 06/22/18 1347 Other (See comments) back incision    Wound - Properties Group Date first assessed: 06/22/18 [KS] Time first assessed: 1347 [KS] Side: Other (See comments) [KS] Location: back [KS] Type: incision [KS] Recorded by:  [KS] Hemalatha Gomez RN 06/22/18 1347    Row Name 07/03/18 1025             Coping    Observed Emotional State accepting  -VG      Verbalized Emotional State acceptance  -VG      Recorded by [VG] Margi Lauren, PT 07/03/18 1114      Row Name 07/03/18 1025             Plan of Care Review    Plan of Care Reviewed With patient  -VG      Recorded by [VG] Margi Lauren, PT 07/03/18 1114      Row Name 07/03/18 1025             Outcome Summary/Treatment Plan (PT)    Daily Summary of Progress (PT) progress towards functional goals is fair  -VG      Anticipated Equipment Needs at Discharge (PT) other (see comments)   defer to rehab  -VG      Anticipated Discharge Disposition (PT)  skilled nursing facility  -VG      Recorded by [VG] Margi Lauren, PT 07/03/18 1114        User Key  (r) = Recorded By, (t) = Taken By, (c) = Cosigned By    Initials Name Effective Dates Discipline    ST Brea Miles, OTR 06/10/18 -  OT    DEXTER Gomez, RN 05/09/17 -  Nurse    VG Margi Lauren, PT 05/29/18 -  PT        Wound 06/22/18 1347 Other (See comments) back incision (Active)   Dressing Appearance dry;intact;no drainage 7/3/2018  1:00 PM   Closure Adhesive closure strips 7/3/2018  1:00 PM   Drainage Amount none 7/3/2018  8:05 AM   Dressing Care, Wound dressing changed;border dressing 7/3/2018  1:00 PM         Occupational Therapy Education     Title: PT OT SLP Therapies (Active)     Topic: Occupational Therapy (Done)     Point: ADL training (Done)     Description: Instruct learner(s) on proper safety adaptation and remediation techniques during self care or transfers.   Instruct in proper use of assistive devices.   Learning Progress Summary     Learner Status Readiness Method Response Comment Documented by    Patient Done Acceptance E,TB,D VU,DU spinal precautions, log roll, AE, LBD, benefits of activity  07/03/18 1516     Done Acceptance E,TB VU   07/03/18 0321     Done Acceptance E,TB,D VU,DU spinal precautions, donning brace and reinforcement for wear schedule, ADLs, balance, rwx safety, transfers ST 07/02/18 1434     Active Acceptance E NR  MC 07/01/18 1503     Active Acceptance D,E NR log roll, LSO needs, ADLs, transfers, bed mobility, feeding ST 06/28/18 1524     Active Acceptance E,D NR spinal precautions, command following, grooming, HEP, orientation ST 06/26/18 1508     Active Acceptance E,D,TB NR LB dressing ADL retraining with AE of sock aid and reacher, requires reinforcement did demonstrate learning  06/25/18 1144     Done Acceptance E,TB,H,D VU,DU role of OT, benefits of activity, spinal precautions, LBD/AE, transfers, rwx use and safety, balance, brace and wear schedule   06/23/18 1621          Point: Home exercise program (Done)     Description: Instruct learner(s) on appropriate technique for monitoring, assisting and/or progressing therapeutic exercises/activities.   Learning Progress Summary     Learner Status Readiness Method Response Comment Documented by    Patient Done Acceptance E,TB,D VU,DU spinal precautions, log roll, AE, LBD, benefits of activity  07/03/18 1516     Done Acceptance TERELLCOOPER VU   07/03/18 0321     Done Acceptance E,TBCARINA VU,DU spinal precautions, donning brace and reinforcement for wear schedule, ADLs, balance, rwx safety, transfers ST 07/02/18 1434     Active Acceptance D,E NR log roll, LSO needs, ADLs, transfers, bed mobility, feeding ST 06/28/18 1524     Active Acceptance E,D NR spinal precautions, command following, grooming, HEP, orientation  06/26/18 1508     Done Acceptance E,TB,H,D VU,DU role of OT, benefits of activity, spinal precautions, LBD/AE, transfers, rwx use and safety, balance, brace and wear schedule  06/23/18 1621          Point: Precautions (Done)     Description: Instruct learner(s) on prescribed precautions during self-care and functional transfers.   Learning Progress Summary     Learner Status Readiness Method Response Comment Documented by    Patient Done Acceptance E,TB,CARINA VU,DU spinal precautions, log roll, AE, LBD, benefits of activity  07/03/18 1516     Done Acceptance COOPER FIGUEREDO   07/03/18 0321     Done Acceptance E,CARINA GAMBOA VU,DU spinal precautions, donning brace and reinforcement for wear schedule, ADLs, balance, rwx safety, transfers  07/02/18 1434     Active Acceptance E NR   07/01/18 1503     Active Acceptance D,E NR log roll, LSO needs, ADLs, transfers, bed mobility, feeding  06/28/18 1524     Active Acceptance E,D,TB NR LB dressing ADL retraining with AE of sock aid and reacher, requires reinforcement did demonstrate learning  06/25/18 1144     Done Acceptance E,TB,H,D VU,DU role of OT, benefits of activity,  spinal precautions, LBD/AE, transfers, rwx use and safety, balance, brace and wear schedule  06/23/18 1621          Point: Body mechanics (Done)     Description: Instruct learner(s) on proper positioning and spine alignment during self-care, functional mobility activities and/or exercises.   Learning Progress Summary     Learner Status Readiness Method Response Comment Documented by    Patient Done Acceptance E,TB,D VU,DU spinal precautions, log roll, AE, LBD, benefits of activity  07/03/18 1516     Done Acceptance E,TB VU   07/03/18 0321     Done Acceptance E,TB,D VU,DU spinal precautions, donning brace and reinforcement for wear schedule, ADLs, balance, rwx safety, transfers  07/02/18 1434     Active Acceptance E NR   07/01/18 1503     Active Acceptance D,E NR log roll, LSO needs, ADLs, transfers, bed mobility, feeding  06/28/18 1524     Active Acceptance E,D,TB NR LB dressing ADL retraining with AE of sock aid and reacher, requires reinforcement did demonstrate learning  06/25/18 1144     Done Acceptance E,TB,H,D VU,DU role of OT, benefits of activity, spinal precautions, LBD/AE, transfers, rwx use and safety, balance, brace and wear schedule  06/23/18 1621                      User Key     Initials Effective Dates Name Provider Type Discipline     06/10/18 -  Brea Miles OTR Occupational Therapist OT     05/31/18 -  DEVAUGHN Alvarez Registered Nurse Nurse     03/14/16 -  Coco Monahan, OT Occupational Therapist OT     04/03/18 -  Ashley Atwood, OT Occupational Therapist OT                OT Recommendation and Plan  Outcome Summary/Treatment Plan (OT)  Anticipated Equipment Needs at Discharge (OT): tub bench, raised toilet seat  Anticipated Discharge Disposition (OT): inpatient rehabilitation facility, skilled nursing facility  Planned Therapy Interventions (OT Eval): adaptive equipment training, functional balance retraining, occupation/activity based interventions,  patient/caregiver education/training, strengthening exercise, transfer/mobility retraining  Therapy Frequency (OT Eval): daily  Plan of Care Review  Plan of Care Reviewed With: patient  Plan of Care Reviewed With: patient  Outcome Summary: Pt requiring max encouragement this date. Able to however, recall 3/3 spinal precautions and demonstrate use of AE for LBD. Will need continued practice w/log roll and transfers for safety         Outcome Measures     Row Name 07/03/18 1433 07/03/18 1025 07/02/18 0928       How much help from another person do you currently need...    Turning from your back to your side while in flat bed without using bedrails?  -- 3  -VG  --    Moving from lying on back to sitting on the side of a flat bed without bedrails?  -- 3  -VG  --    Moving to and from a bed to a chair (including a wheelchair)?  -- 3  -VG  --    Standing up from a chair using your arms (e.g., wheelchair, bedside chair)?  -- 3  -VG  --    Climbing 3-5 steps with a railing?  -- 2  -VG  --    To walk in hospital room?  -- 3  -VG  --    AM-PAC 6 Clicks Score  -- 17  -VG  --       How much help from another is currently needed...    Putting on and taking off regular lower body clothing? 3  -ST  -- 3  -ST    Bathing (including washing, rinsing, and drying) 3  -ST  -- 3  -ST    Toileting (which includes using toilet bed pan or urinal) 2  -ST  -- 2  -ST    Putting on and taking off regular upper body clothing 3  -ST  -- 3  -ST    Taking care of personal grooming (such as brushing teeth) 4  -ST  -- 4  -ST    Eating meals 4  -ST  -- 4  -ST    Score 19  -ST  -- 19  -ST       Functional Assessment    Outcome Measure Options  -- AM-PAC 6 Clicks Basic Mobility (PT)  -VG  --    Row Name 07/02/18 0920 07/01/18 1511 07/01/18 1440       How much help from another person do you currently need...    Turning from your back to your side while in flat bed without using bedrails? 3  -LR 3  -KR  --    Moving from lying on back to sitting on the  side of a flat bed without bedrails? 3  -LR 3  -KR  --    Moving to and from a bed to a chair (including a wheelchair)? 3  -LR 3  -KR  --    Standing up from a chair using your arms (e.g., wheelchair, bedside chair)? 3  -LR 3  -KR  --    Climbing 3-5 steps with a railing? 2  -LR 2  -KR  --    To walk in hospital room? 3  -LR 3  -KR  --    AM-PAC 6 Clicks Score 17  -LR 17  -KR  --       How much help from another is currently needed...    Putting on and taking off regular lower body clothing?  --  -- 2  -MC    Bathing (including washing, rinsing, and drying)  --  -- 2  -MC    Toileting (which includes using toilet bed pan or urinal)  --  -- 2  -MC    Putting on and taking off regular upper body clothing  --  -- 2  -MC    Taking care of personal grooming (such as brushing teeth)  --  -- 2  -MC    Eating meals  --  -- 2  -MC    Score  --  -- 12  -MC       Functional Assessment    Outcome Measure Options AM-PAC 6 Clicks Basic Mobility (PT)  -LR AM-PAC 6 Clicks Basic Mobility (PT)  -KR AM-PAC 6 Clicks Daily Activity (OT)  -MC      User Key  (r) = Recorded By, (t) = Taken By, (c) = Cosigned By    Initials Name Provider Type    ST Brea Miles, OTR Occupational Therapist    LR Adrianne Fernandez, PT Physical Therapist     Coco Monahan, OT Occupational Therapist    DIEGO Cheek, PT Physical Therapist     Margi Lauren, PT Physical Therapist           Time Calculation:         Time Calculation- OT     Row Name 07/03/18 1433 07/03/18 1025          Time Calculation- OT    OT Start Time 1433  -ST  --     OT Received On 07/03/18 -ST  --     OT Goal Re-Cert Due Date 07/13/18 -ST  --        Timed Charges    10707 - Gait Training Minutes   -- 25  -VG     73813 - OT Self Care/Mgmt Minutes 16  -ST  --       User Key  (r) = Recorded By, (t) = Taken By, (c) = Cosigned By    Initials Name Provider Type    ST Brea Miles OTR Occupational Therapist    JESUS Lauren, PT Physical Therapist           Therapy  Suggested Charges     Code   Minutes Charges    97647 (CPT®) Hc Ot Neuromusc Re Education Ea 15 Min      46429 (CPT®) Hc Ot Ther Proc Ea 15 Min      58213 (CPT®) Hc Gait Training Ea 15 Min      24617 (CPT®) Hc Ot Therapeutic Act Ea 15 Min      72001 (CPT®) Hc Ot Manual Therapy Ea 15 Min      68915 (CPT®) Hc Ot Iontophoresis Ea 15 Min      35723 (CPT®) Hc Ot Elec Stim Ea-Per 15 Min      42485 (CPT®) Hc Ot Ultrasound Ea 15 Min      97617 (CPT®) Hc Ot Self Care/Mgmt/Train Ea 15 Min 16 1    Total  16 1        Therapy Charges for Today     Code Description Service Date Service Provider Modifiers Qty    28042085922 HC OT THERAPEUTIC ACT EA 15 MIN 7/2/2018 Brea Miles OTR GO 1    46181413082 HC OT SELF CARE/MGMT/TRAIN EA 15 MIN 7/2/2018 Brea Miles OTR GO 1    70833576895 HC OT SELF CARE/MGMT/TRAIN EA 15 MIN 7/3/2018 Brea Miles OTR GO 1          OT G-codes  OT Professional Judgement Used?: Yes  OT Functional Scales Options: AM-PAC 6 Clicks Daily Activity (OT)  Score: 12  Functional Limitation: Self care  Self Care Current Status (): At least 60 percent but less than 80 percent impaired, limited or restricted  Self Care Goal Status (): At least 40 percent but less than 60 percent impaired, limited or restricted    CHARLOTTE Day  7/3/2018

## 2018-07-03 NOTE — PLAN OF CARE
Problem: Patient Care Overview  Goal: Plan of Care Review   07/03/18 1115   Coping/Psychosocial   Plan of Care Reviewed With patient   Plan of Care Review   Progress no change   OTHER   Outcome Summary BP relatively stable pre and post treatment. Amb distance dec to 40 ft d/t pt c/o increased fatigue today. Required CGA-KATHY with all mobility and increased amount of time d/t fatigue. Demonstrates understanding of HEP. Will continue to progress as able per POC. Progress note completed this date, updated goals to reflect current functional mobility and cognitive status. Pt making gradual progress towards mobility goals.

## 2018-07-03 NOTE — PROGRESS NOTES
Continued Stay Note  [facility]     Patient Name: Sai Main  MRN: [id number]  Today's Date: 7/3/2018    Admit Date: 6/22/2018          Discharge Plan     Row Name 07/03/18 1134       Plan    Plan TBD    Patient/Family in Agreement with Plan yes    Plan Comments Mr. Main has been denied by the following facilities:  [facility] MD said subacute appropriate, but [facility] will not accept a patient into subacute with a history of having a sitter at the bedside,  [facility]-  They will not accept a patient who has been on Haldol, [facility]- no beds, [facility]- still evaluating, [facility]- no beds, [facility]- still evaluating.  Unsure if the facilities will admit a patient on a holidy.    [facility] tentatively rescheduled for tomorrow at 2pm.   Notified Mr. Main's significant other, Ban, by phone.    CM will follow up.    Final Discharge Disposition Code 03 - skilled nursing facility (SNF)    Row Name        Plan    Plan     Patient/Family in Agreement with Plan     Plan Comments     Final Discharge Disposition Code               Discharge Codes    No documentation.       Expected Discharge Date and Time     Expected Discharge Date Expected Discharge Time    Jul 3, 2018             Lora Chew

## 2018-07-03 NOTE — PROGRESS NOTES
Continued Stay Note  [facility]     Patient Name: Sai Main  MRN: [id number]  Today's Date: 7/3/2018    Admit Date: 6/22/2018          Discharge Plan     Row Name 07/03/18 0848       Plan    Plan TBD    Patient/Family in Agreement with Plan yes    Plan Comments Followed up with Татьяна at [facility].  Per Татьяна, the [facility] physicians feel that Mr. Main is more appropriate for subacute level of rehab.  [facility]'s new policy is that if a patient has had a history of requiring a sitter, [facility] subacute will NOT accept patient.    Contacted Mr. Main's SO, Ban and she requested referrals to the following facilities:  1. [facility], 2. [facility], 3. [facility].   Referred to Margret at [facility] and she will check on bed availability.    Ban stated that she will speak to Mr. Main at the bedside regarding SNF referrals.    CM will follow up.    Final Discharge Disposition Code 03 - skilled nursing facility (SNF)              Discharge Codes    No documentation.       Expected Discharge Date and Time     Expected Discharge Date Expected Discharge Time    Jul 3, 2018             Lora Chew

## 2018-07-03 NOTE — PROGRESS NOTES
[facility] Medicine Services  PROGRESS NOTE    Patient Name: Sai Main  : 1945  MRN: [id number]    Date of Admission: 2018  Length of Stay: 11  Primary Care Physician: Jose Pereira MD    Subjective   Subjective     CC:  F/U medical management    HPI:  Pain is controlled. Reports BM.  Per nursing required labetalol for Htn, no further confusion    Review of Systems  CV-no chest pain, no palpitations  Resp-no cough, no dyspnea  GI-no N/V/D, no abd pain      Otherwise ROS is negative except as mentioned in the HPI.    Objective   Objective     Vital Signs:   Temp:  [98 °F (36.7 °C)-99.2 °F (37.3 °C)] 98.5 °F (36.9 °C)  Heart Rate:  [68-82] 73  Resp:  [16-18] 16  BP: (153-203)/(62-91) 181/82        Physical Exam:  Gen-no acute distress,asleep, easily arouses  CV-RRR, S1 S2 normal, grade III systolic blowing murmur  Resp-CTAB, normal WOB, on RA  Abd-soft, NT, ND, +BS  Ext-no edema, PPP  Neuro-A&Ox3, no focal deficits  Psych-appropriate mood    Results Reviewed:  I have personally reviewed current lab, radiology, and data and agree.      Results from last 7 days  Lab Units 18  0954 18  0407 18  0926   WBC 10*3/mm3 10.48 7.49 7.55   HEMOGLOBIN g/dL 11.3* 9.7* 9.0*   HEMATOCRIT % 34.7* 30.0* 27.9*   PLATELETS 10*3/mm3 435 299 230       Results from last 7 days  Lab Units 18  0954 18  0407 18  0926 18  1526   SODIUM mmol/L 136 136 136 134   POTASSIUM mmol/L 4.2 4.8 4.8 5.1   CHLORIDE mmol/L 98* 104 104 100   CO2 mmol/L 23.0 20.0 26.0 24.0   BUN mg/dL 26* 32* 43* 31*   CREATININE mg/dL 1.07 1.50* 2.02* 1.92*   GLUCOSE mg/dL 243* 123* 81 148*   CALCIUM mg/dL 10.5* 10.3 8.6* 9.7   ALT (SGPT) U/L  --   --   --  14   AST (SGOT) U/L  --   --   --  29     Estimated Creatinine Clearance: 81.2 mL/min (by C-G formula based on SCr of 1.07 mg/dL).    Microbiology Results Abnormal     Procedure Component Value - Date/Time    Eosinophil Smear -  Urine, Urine, Clean Catch [[id number]]  (Normal) Collected:  06/27/18 2227    Lab Status:  Final result Specimen:  Urine from Urine, Clean Catch Updated:  06/27/18 2338     Eosinophil Smear 0 % EOS/100 Cells     Narrative:       No eosinophil seen             I have reviewed the medications.    Assessment/Plan   Assessment / Plan     Hospital Problem List     Physical deconditioning    Spondylosis with myelopathy, lumbar region    Overview Signed 5/23/2018 11:36 AM by Malina Irving PA-C     Added automatically from request for surgery [id number]         Spinal stenosis, lumbar region, with neurogenic claudication    Overview Signed 5/23/2018 11:36 AM by Malina Irving PA-C     Added automatically from request for surgery [id number]         Degenerative disc disease, lumbar    Overview Signed 5/23/2018 11:36 AM by Malina Irving PA-C     Added automatically from request for surgery [id number]         Osteoarthritis of spine with radiculopathy, lumbar region    Overview Signed 5/23/2018 11:36 AM by Malina Irving PA-C     Added automatically from request for surgery [id number]         Spondylosis    Impaired functional mobility, balance, gait, and endurance    Metabolic encephalopathy    AYE (acute kidney injury) (CMS/[facility])    Anemia    Hypotension    Hypoglycemia             Brief Hospital Course to date:  Sai Main is a 72 y.o. male who presented to the hospital on 6/22/18 for planned surgical intervention for lumbar spinal stenosis with Dr. Cantu. He has had complicated post-operative course including acute blood loss anemia requiring blood transfusion, confusion and disorientation, hypotension, hypoglycemia, and acute kidney injury. Hospitalist service consulted 6/27 for medical management.       Assessment & Plan:  --Post-op care per Dr. Cantu.  --Confusion has resolved, likely due to over-medication, post-op state, etc. Stop scheduled Haldol, consider stopping/decrease gabapentin if confusion returns,  also consider seroquel as Haldol order is hindering his transfer  --HgbA1c 5.7, continue insulin regimen, titrate prn  --AYE resolved with fluids. Probably due to hypotension, acute blood loss anemia. Renal ultrasound unremarkable. Repeat labs  --Increase Coreg, Lisinopril recently increased  --Anemia stable s/p 2 units PRBC.       CODE STATUS:   Code Status and Medical Interventions:   Ordered at: 06/22/18 5949     Level Of Support Discussed With:    Patient     Code Status:    CPR     Medical Interventions (Level of Support Prior to Arrest):    Full       Disposition: I expect the patient to be discharged to [facility] per Neurosurgery      Electronically signed by ROVERTO Iniguez, 07/03/18, 11:45 AM.

## 2018-07-03 NOTE — PLAN OF CARE
"Problem: Patient Care Overview  Goal: Plan of Care Review  Outcome: Ongoing (interventions implemented as appropriate)   07/03/18 1021   Coping/Psychosocial   Plan of Care Reviewed With patient   Plan of Care Review   Progress no change   OTHER   Outcome Summary Pt A&Ox4; some episodes of confusion. BP still elevated this shift, PRN Labetolol given and Coreg increased to 6.25mg BID. Pt up with 1-2 assist, walker & gait belt with back brace at bedside. Pt states he \"hasnt felt well today\" and hasnt hardly participated with PT/OT. Issues arrising with placement r/t this hopsital stay; see CM notes. Lumbar drsng CDI. Percocet frequency increased for better pain control.          "

## 2018-07-03 NOTE — PLAN OF CARE
Problem: Patient Care Overview  Goal: Plan of Care Review  Outcome: Ongoing (interventions implemented as appropriate)   07/03/18 1516   Coping/Psychosocial   Plan of Care Reviewed With patient   OTHER   Outcome Summary Pt requiring max encouragement this date. Able to however, recall 3/3 spinal precautions and demonstrate use of AE for LBD. Will need continued practice w/log roll and transfers for safety

## 2018-07-03 NOTE — THERAPY PROGRESS REPORT/RE-CERT
Acute Care - Physical Therapy Progress Note  [facility]     Patient Name: Sai Main  : 1945  MRN: [id number]  Today's Date: 7/3/2018  Onset of Illness/Injury or Date of Surgery: 18  Date of Referral to PT: 18  Referring Physician: MD Pepe    Admit Date: 2018    Visit Dx:    ICD-10-CM ICD-9-CM   1. Impaired functional mobility, balance, gait, and endurance Z74.09 V49.89   2. Impaired mobility and ADLs Z74.09 799.89     Patient Active Problem List   Diagnosis   • Episode of change in speech   • Spinal stenosis of lumbar region with neurogenic claudication   • Radicular pain of both lower extremities   • Chronic bilateral low back pain with bilateral sciatica   • DDD (degenerative disc disease), lumbar   • Spondylosis of lumbar region without myelopathy or radiculopathy   • Congestive heart failure (CMS/HCC)   • IDDM (insulin dependent diabetes mellitus) (CMS/[facility])   • Insomnia   • Physical deconditioning   • Anxiety and depression   • At high risk for falls   • Gait disturbance   • Diabetic polyneuropathy associated with type 1 diabetes mellitus (CMS/HCC)   • Left foot drop   • Moderate obesity   • Spondylosis with myelopathy, lumbar region   • Spinal stenosis, lumbar region, with neurogenic claudication   • Degenerative disc disease, lumbar   • Osteoarthritis of spine with radiculopathy, lumbar region   • Spondylosis   • Impaired functional mobility, balance, gait, and endurance   • Metabolic encephalopathy   • AYE (acute kidney injury) (CMS/HCC)   • Anemia   • Hypotension   • Hypoglycemia       Therapy Treatment          Rehabilitation Treatment Summary     Row Name 18 1025             Treatment Time/Intention    Discipline physical therapist  -VG      Document Type therapy note (daily note)  -VG      Subjective Information complains of;fatigue;pain  -VG      Mode of Treatment individual therapy;physical therapy  -VG      Patient/Family Observations UIC, chair check, LSO  donned; no family present.  -VG      Care Plan Review patient/other agree to care plan  -VG      Therapy Frequency (PT Clinical Impression) daily  -VG      Patient Effort adequate  -VG      Existing Precautions/Restrictions fall;spinal;brace worn when out of bed  -VG      Recorded by [VG] Margi Lauren, PT 07/03/18 1114      Row Name 07/03/18 1025             Vital Signs    Pre Systolic BP Rehab 169  -VG      Pre Treatment Diastolic BP 87  -VG      Post Systolic BP Rehab 179  -VG      Post Treatment Diastolic BP 92  -VG      Pre Patient Position Sitting  -VG      Post Patient Position Supine  -VG      Recorded by [VG] Margi Lauren, PT 07/03/18 1114      Row Name 07/03/18 1025             Cognitive Assessment/Intervention- PT/OT    Affect/Mental Status (Cognitive) WNL  -VG      Orientation Status (Cognition) oriented x 4  -VG      Follows Commands (Cognition) follows one step commands;75-90% accuracy;verbal cues/prompting required;delayed response/completion;increased processing time needed;initiation impaired  -VG      Cognitive Function (Cognitive) attention deficit;memory deficit;safety deficit  -VG      Attention Deficit (Cognitive) moderate deficit;requires cues/redirection to task  -VG      Executive Function Deficit (Cognition) moderate deficit;organization/sequencing;planning/decision making;problem solving/reasoning  -VG      Memory Deficit (Cognitive) severe deficit;short term memory;working memory  -VG      Safety Deficit (Cognitive) moderate deficit;safety precautions awareness;safety precautions follow-through/compliance;problem solving;ability to follow commands  -VG      Personal Safety Interventions fall prevention program maintained;gait belt;nonskid shoes/slippers when out of bed;supervised activity  -VG      Recorded by [VG] Margi Lauren, PT 07/03/18 1114      Row Name 07/03/18 1025             Safety Issues, Functional Mobility    Safety Issues Affecting Function (Mobility) safety  precautions follow-through/compliance;safety precaution awareness;sequencing abilities;positioning of assistive device  -VG      Impairments Affecting Function (Mobility) balance;cognition;pain;strength;endurance/activity tolerance  -VG      Recorded by [VG] Margi Lauren, PT 07/03/18 1114      Row Name 07/03/18 1025             Bed Mobility Assessment/Treatment    Bed Mobility Assessment/Treatment sit-supine;scooting/bridging  -VG      Scooting/Bridging [place] (Bed Mobility) minimum assist (75% patient effort)  -VG      Sit-Supine [place] (Bed Mobility) verbal cues;moderate assist (50% patient effort);1 person assist  -VG      Bed Mobility, Safety Issues decreased use of arms for pushing/pulling;decreased use of legs for bridging/pushing  -VG      Assistive Device (Bed Mobility) bed rails  -VG      Comment (Bed Mobility) Cues for log roll. LSO doffed in sitting EOB. Increased amount of time required for all mobility d/t fatigue.  -VG      Recorded by [VG] Margi Lauren, PT 07/03/18 1114      Row Name 07/03/18 1025             Transfer Assessment/Treatment    Transfer Assessment/Treatment sit-stand transfer;stand-sit transfer;toilet transfer  -VG      Comment (Transfers) VC's for backing up to transfer surface, hand placement, maintaining spinal prec, and AD placement. Increased amount of time required for all mobility d/t fatigue. Attempted toileting, no void, no BM.  -VG      Recorded by [VG] Margi Lauren, PT 07/03/18 1114      Row Name 07/03/18 1025             Sit-Stand Transfer    Sit-Stand [place] (Transfers) contact guard;verbal cues  -VG      Assistive Device (Sit-Stand Transfers) walker, front-wheeled  -VG      Recorded by [VG] Margi Lauren, PT 07/03/18 1114      Row Name 07/03/18 1025             Stand-Sit Transfer    Stand-Sit [place] (Transfers) contact guard;verbal cues  -VG      Assistive Device (Stand-Sit Transfers) walker, front-wheeled  -VG      Recorded by [VG]  Margi Lauren, PT 07/03/18 1114      Row Name 07/03/18 1025             Toilet Transfer    Type (Toilet Transfer) sit-stand;stand-sit  -VG      [place] Level (Toilet Transfer) contact guard;verbal cues  -VG      Assistive Device (Toilet Transfer) raised toilet seat;grab bars/safety frame;walker, front-wheeled  -VG      Recorded by [VG] Margi Lauren, PT 07/03/18 1114      Row Name 07/03/18 1025             Gait/Stairs Assessment/Training    52889 - Gait Training Minutes  25  -VG      Gait/Stairs Assessment/Training gait/ambulation independence;gait/ambulation assistive device;distance ambulated;gait pattern;gait deviations;maintains weight-bearing status  -VG      [place] Level (Gait) contact guard;verbal cues  -VG      Assistive Device (Gait) walker, front-wheeled  -VG      Distance in Feet (Gait) 40  -VG      Pattern (Gait) step-through;step-to  -VG      Deviations/Abnormal Patterns (Gait) bilateral deviations;jackie decreased;gait speed decreased;stride length decreased  -VG      Bilateral Gait Deviations forward flexed posture;heel strike decreased  -VG      Comment (Gait/Stairs) Pt initially declined ambulation d/t fatigue. Agreed to walk to bathroom. Amb 40 ft with CGA and RW, distance limited by fatigue. Cues for keeping body inside RW frame and inc wbing thru BUEs d/t pain in R ankle. Increased amount of time required for all mobility d/t fatigue.  -VG      Recorded by [VG] Margi Lauren, PT 07/03/18 1114      Row Name 07/03/18 1025             Therapeutic Exercise    47746 - PT Therapeutic Exercise Minutes 13  -VG      Recorded by [VG] Margi Lauren, PT 07/03/18 1114      Row Name 07/03/18 1025             Therapeutic Exercise    Lower Extremity (Therapeutic Exercise) gluteal sets;quad sets, bilateral  -VG      Lower Extremity Range of Motion (Therapeutic Exercise) hip internal/external rotation, bilateral;ankle dorsiflexion/plantar flexion, bilateral  -VG      Core Strength (Therapeutic  Exercise) abdominal bracing  -VG      Exercise Type (Therapeutic Exercise) AROM (active range of motion)  -VG      Position (Therapeutic Exercise) supine  -VG      Sets/Reps (Therapeutic Exercise) 10x  -VG      Comment (Therapeutic Exercise) VC's and CGA required for mechanics, pt fatigued with ther ex, requiring cues for attention to task. Per spinal protocol.  -VG      Recorded by [VG] Margi Lauren, PT 07/03/18 1114      Row Name 07/03/18 1025             Positioning and Restraints    Pre-Treatment Position sitting in chair/recliner  -VG      Post Treatment Position bed  -VG      In Bed supine;call light within reach;encouraged to call for assist;exit alarm on;side rails up x2  -VG      Recorded by [VG] Margi Lauren, PT 07/03/18 1114      Row Name 07/03/18 1025             Pain Scale: Numbers Pre/Post-Treatment    Pain Scale: Numbers, Pretreatment 7/10  -VG      Pain Scale: Numbers, Post-Treatment 7/10  -VG      Pain Location - Orientation lower  -VG      Pain Location back  -VG      Pain Intervention(s) Repositioned;Ambulation/increased activity   Nsg administered pain med prior to session.  -VG      Recorded by [VG] Margi Lauren, PT 07/03/18 1114      Row Name                Wound 06/22/18 1347 Other (See comments) back incision    Wound - Properties Group Date first assessed: 06/22/18 [KS] Time first assessed: 1347 [KS] Side: Other (See comments) [KS] Location: back [KS] Type: incision [KS] Recorded by:  [KS] Hemalatha Gomez RN 06/22/18 1347    Row Name                Wound 06/22/18 1347 Other (See comments) back incision    Wound - Properties Group Date first assessed: 06/22/18 [KS] Time first assessed: 1347 [KS] Side: Other (See comments) [KS] Location: back [KS] Type: incision [KS] Recorded by:  [KS] Hemalatha Gomez RN 06/22/18 1347    Row Name 07/03/18 1025             Coping    Observed Emotional State accepting  -VG      Verbalized Emotional State acceptance  -VG      Recorded by [VG]  Margi Lauren, PT 07/03/18 1114      Row Name 07/03/18 1025             Plan of Care Review    Plan of Care Reviewed With patient  -VG      Recorded by [VG] Margi Lauren, PT 07/03/18 1114      Row Name 07/03/18 1025             Outcome Summary/Treatment Plan (PT)    Daily Summary of Progress (PT) progress towards functional goals is fair  -VG      Anticipated Equipment Needs at Discharge (PT) other (see comments)   defer to rehab  -VG      Anticipated Discharge Disposition (PT) skilled nursing facility  -VG      Recorded by [VG] Margi Lauren, PT 07/03/18 1114        User Key  (r) = Recorded By, (t) = Taken By, (c) = Cosigned By    Initials Name Effective Dates Discipline    KS Hemalatha Gomez RN 05/09/17 -  Nurse    VG Margi Lauren, PT 05/29/18 -  PT          Wound 06/22/18 1347 Other (See comments) back incision (Active)   Dressing Appearance dry;intact;no drainage 7/3/2018  8:05 AM   Closure LUCILLE 7/3/2018  8:05 AM   Drainage Amount none 7/3/2018  8:05 AM   Dressing Care, Wound border dressing 7/3/2018  8:05 AM               PT Rehab Goals     Row Name 07/03/18 1025             Bed Mobility Goal 1 (PT)    Activity/Assistive Device (Bed Mobility Goal 1, PT) sit to supine/supine to sit  -VG      [place] Level/Cues Needed (Bed Mobility Goal 1, PT) supervision required  -VG      Time Frame (Bed Mobility Goal 1, PT) long term goal (LTG);5 days  -VG      Progress/Outcomes (Bed Mobility Goal 1, PT) goal revised this date;goal ongoing  -VG         Transfer Goal 1 (PT)    Activity/Assistive Device (Transfer Goal 1, PT) sit-to-stand/stand-to-sit;walker, rolling  -VG      [place] Level/Cues Needed (Transfer Goal 1, PT) supervision required  -VG      Time Frame (Transfer Goal 1, PT) long term goal (LTG);5 days  -VG      Progress/Outcome (Transfer Goal 1, PT) goal ongoing;goal revised this date  -VG         Gait Training Goal 1 (PT)    Activity/Assistive Device (Gait Training Goal 1, PT) gait (walking  locomotion);assistive device use;walker, rolling  -VG      [place] Level (Gait Training Goal 1, PT) supervision required  -VG      Distance (Gait Goal 1, PT) 150 feet  -VG      Time Frame (Gait Training Goal 1, PT) long term goal (LTG);5 days  -VG      Progress/Outcome (Gait Training Goal 1, PT) goal ongoing  -VG        User Key  (r) = Recorded By, (t) = Taken By, (c) = Cosigned By    Initials Name Provider Type Discipline    JESUS Lauren, PT Physical Therapist PT          Physical Therapy Education     Title: PT OT SLP Therapies (Active)     Topic: Physical Therapy (Active)     Point: Mobility training (Active)    Learning Progress Summary     Learner Status Readiness Method Response Comment Documented by    Patient Active Acceptance E NR Educated on HEP, correct mechanics for safe functional mobility, and spinal precautions; reinforcement needed.  07/03/18 1114     Active Acceptance E,D NR Educated on spinal precautions, correct log rolling technique, correct gait mechanics, and HEP. LR 07/02/18 1016     Active Acceptance E NR  KR 07/01/18 1623     Done Acceptance E TITUS PETERS   06/30/18 1112     Active Acceptance E NR Educated on proper mechanics for safe functional mobility. Needs reinforcement d/t cognition.  06/28/18 1406     Active Acceptance E,D NR Reviewed back precautions, safety with mobility/transfers, HEP.  06/27/18 1557     Active Acceptance E NR,NL BACK PRECAUTIONS, IMPORTANCE OF HAVING BRACE UP AT ALL TIMES WHEN OOB. CD 06/26/18 1109     Done Acceptance E,D LORRAINENR Reviewed benefits of activity, spinal precautions, log roll technique, correct gait mechanics, HEP.  06/25/18 0906     Done Acceptance E,D LORRAINENR Reviewed spinal precautions, correct gait mechanics, HEP, benefits of activity.  06/24/18 1352     Active Acceptance E,D,H NR Reviewed spinal precautions, log roll technique, performing ankle pumps throughout the day, correct gait mechanics, safety with mobility and transfers, wear  schedule for lumbar brace.  06/23/18 1457    Significant Other Done Acceptance E,D VU,NR Reviewed spinal precautions, correct gait mechanics, HEP, benefits of activity.  06/24/18 1352     Active Acceptance E,D,H NR Reviewed spinal precautions, log roll technique, performing ankle pumps throughout the day, correct gait mechanics, safety with mobility and transfers, wear schedule for lumbar brace.  06/23/18 1457          Point: Home exercise program (Active)    Learning Progress Summary     Learner Status Readiness Method Response Comment Documented by    Patient Active Acceptance E NR Educated on HEP, correct mechanics for safe functional mobility, and spinal precautions; reinforcement needed.  07/03/18 1114     Active Acceptance E,D NR Educated on spinal precautions, correct log rolling technique, correct gait mechanics, and HEP. LR 07/02/18 1016     Active Acceptance E NR   07/01/18 1623     Done Acceptance E TITUS PETERS   06/30/18 1112     Active Acceptance E NR Educated on proper mechanics for safe functional mobility. Needs reinforcement d/t cognition.  06/28/18 1406     Active Acceptance E,D NR Reviewed back precautions, safety with mobility/transfers, HEP.  06/27/18 1557     Active Acceptance E NR,NL BACK PRECAUTIONS, IMPORTANCE OF HAVING BRACE UP AT ALL TIMES WHEN OOB. CD 06/26/18 1109     Done Acceptance E,D LORRAINE,NR Reviewed benefits of activity, spinal precautions, log roll technique, correct gait mechanics, HEP.  06/25/18 0906     Done Acceptance E,D [place]OR[place],NR Reviewed spinal precautions, correct gait mechanics, HEP, benefits of activity.  06/24/18 1352     Active Acceptance E,D,H NR Reviewed spinal precautions, log roll technique, performing ankle pumps throughout the day, correct gait mechanics, safety with mobility and transfers, wear schedule for lumbar brace.  06/23/18 1457    Significant Other Done Acceptance E,D VU,NR Reviewed spinal precautions, correct gait mechanics, HEP, benefits of  activity.  06/24/18 1352     Active Acceptance E,D,H NR Reviewed spinal precautions, log roll technique, performing ankle pumps throughout the day, correct gait mechanics, safety with mobility and transfers, wear schedule for lumbar brace.  06/23/18 1457          Point: Body mechanics (Active)    Learning Progress Summary     Learner Status Readiness Method Response Comment Documented by    Patient Active Acceptance E NR Educated on HEP, correct mechanics for safe functional mobility, and spinal precautions; reinforcement needed.  07/03/18 1114     Active Acceptance E,D NR Educated on spinal precautions, correct log rolling technique, correct gait mechanics, and HEP.  07/02/18 1016     Active Acceptance E NR  KR 07/01/18 1623     Done Acceptance E TITUS PETERS   06/30/18 1112     Active Acceptance E NR Educated on proper mechanics for safe functional mobility. Needs reinforcement d/t cognition.  06/28/18 1406     Active Acceptance E,D NR Reviewed back precautions, safety with mobility/transfers, HEP.  06/27/18 1557     Active Acceptance E NR,NL BACK PRECAUTIONS, IMPORTANCE OF HAVING BRACE UP AT ALL TIMES WHEN OOB. CD 06/26/18 1109     Done Acceptance E,D LORRAINENR Reviewed benefits of activity, spinal precautions, log roll technique, correct gait mechanics, HEP.  06/25/18 0906     Done Acceptance E,D LORRAINENR Reviewed spinal precautions, correct gait mechanics, HEP, benefits of activity.  06/24/18 1352     Active Acceptance E,D,H NR Reviewed spinal precautions, log roll technique, performing ankle pumps throughout the day, correct gait mechanics, safety with mobility and transfers, wear schedule for lumbar brace.  06/23/18 1457    Significant Other Done Acceptance E,D LORRAINENR Reviewed spinal precautions, correct gait mechanics, HEP, benefits of activity.  06/24/18 1352     Active Acceptance E,D,H NR Reviewed spinal precautions, log roll technique, performing ankle pumps throughout the day, correct gait  mechanics, safety with mobility and transfers, wear schedule for lumbar brace.  06/23/18 1457          Point: Precautions (Active)    Learning Progress Summary     Learner Status Readiness Method Response Comment Documented by    Patient Active Acceptance E NR Educated on HEP, correct mechanics for safe functional mobility, and spinal precautions; reinforcement needed.  07/03/18 1114     Active Acceptance E,D NR Educated on spinal precautions, correct log rolling technique, correct gait mechanics, and HEP. LR 07/02/18 1016     Active Acceptance E NR  KR 07/01/18 1623     Done Acceptance E TITUS PETERS   06/30/18 1112     Active Acceptance E NR Educated on proper mechanics for safe functional mobility. Needs reinforcement d/t cognition.  06/28/18 1406     Active Acceptance E,D NR Reviewed back precautions, safety with mobility/transfers, HEP.  06/27/18 1557     Active Acceptance E NR,NL BACK PRECAUTIONS, IMPORTANCE OF HAVING BRACE UP AT ALL TIMES WHEN OOB. CD 06/26/18 1109     Done Acceptance E,D LORRAINE,NR Reviewed benefits of activity, spinal precautions, log roll technique, correct gait mechanics, HEP.  06/25/18 0906     Done Acceptance E,D LORRAINE,NR Reviewed spinal precautions, correct gait mechanics, HEP, benefits of activity.  06/24/18 1352     Active Acceptance E,D,H NR Reviewed spinal precautions, log roll technique, performing ankle pumps throughout the day, correct gait mechanics, safety with mobility and transfers, wear schedule for lumbar brace.  06/23/18 1457    Significant Other Done Acceptance E,D [place],NR Reviewed spinal precautions, correct gait mechanics, HEP, benefits of activity.  06/24/18 1352     Active Acceptance E,D,H NR Reviewed spinal precautions, log roll technique, performing ankle pumps throughout the day, correct gait mechanics, safety with mobility and transfers, wear schedule for lumbar brace.  06/23/18 1457                      User Key     Initials Effective Dates Name Provider Type  Discipline    CD 06/19/15 -  Stephanie Boyd, PT Physical Therapist PT    JM 06/19/15 -  Chad Lizama, PT Physical Therapist PT    LR 06/19/15 -  Adrianne Fernandez, PT Physical Therapist PT    KR 04/03/18 -  Narcisa Cheek, PT Physical Therapist PT    LM 04/03/18 -  Kathleen DARIO Barrios, PT Physical Therapist PT    VG 05/29/18 -  Margi Lauren, PT Physical Therapist PT                    PT Recommendation and Plan  Anticipated Discharge Disposition (PT): skilled nursing facility  Therapy Frequency (PT Clinical Impression): daily  Outcome Summary/Treatment Plan (PT)  Daily Summary of Progress (PT): progress towards functional goals is fair  Anticipated Equipment Needs at Discharge (PT): other (see comments) (defer to rehab)  Anticipated Discharge Disposition (PT): skilled nursing facility  Plan of Care Reviewed With: patient  Progress: no change  Outcome Summary: BP relatively stable pre and post treatment. Amb distance dec to 40 ft d/t pt c/o increased fatigue today. Required CGA-KATHY with all mobility and increased amount of time d/t fatigue. Demonstrates understanding of HEP. Will continue to progress as able per POC.          Outcome Measures     Row Name 07/03/18 1025 07/02/18 0928 07/02/18 0920       How much help from another person do you currently need...    Turning from your back to your side while in flat bed without using bedrails? 3  -VG  -- 3  -LR    Moving from lying on back to sitting on the side of a flat bed without bedrails? 3  -VG  -- 3  -LR    Moving to and from a bed to a chair (including a wheelchair)? 3  -VG  -- 3  -LR    Standing up from a chair using your arms (e.g., wheelchair, bedside chair)? 3  -VG  -- 3  -LR    Climbing 3-5 steps with a railing? 2  -VG  -- 2  -LR    To walk in hospital room? 3  -VG  -- 3  -LR    AM-PAC 6 Clicks Score 17  -VG  -- 17  -LR       How much help from another is currently needed...    Putting on and taking off regular lower body clothing?  -- 3  -ST  --     Bathing (including washing, rinsing, and drying)  -- 3  -ST  --    Toileting (which includes using toilet bed pan or urinal)  -- 2  -ST  --    Putting on and taking off regular upper body clothing  -- 3  -ST  --    Taking care of personal grooming (such as brushing teeth)  -- 4  -ST  --    Eating meals  -- 4  -ST  --    Score  -- 19  -ST  --       Functional Assessment    Outcome Measure Options AM-[facility] 6 Clicks Basic Mobility (PT)  -VG  -- AM-PAC 6 Clicks Basic Mobility (PT)  -LR    Row Name 07/01/18 1511 07/01/18 1440          How much help from another person do you currently need...    Turning from your back to your side while in flat bed without using bedrails? 3  -KR  --     Moving from lying on back to sitting on the side of a flat bed without bedrails? 3  -KR  --     Moving to and from a bed to a chair (including a wheelchair)? 3  -KR  --     Standing up from a chair using your arms (e.g., wheelchair, bedside chair)? 3  -KR  --     Climbing 3-5 steps with a railing? 2  -KR  --     To walk in hospital room? 3  -KR  --     AM-PAC 6 Clicks Score 17  -KR  --        How much help from another is currently needed...    Putting on and taking off regular lower body clothing?  -- 2  -MC     Bathing (including washing, rinsing, and drying)  -- 2  -MC     Toileting (which includes using toilet bed pan or urinal)  -- 2  -MC     Putting on and taking off regular upper body clothing  -- 2  -MC     Taking care of personal grooming (such as brushing teeth)  -- 2  -MC     Eating meals  -- 2  -MC     Score  -- 12  -MC        Functional Assessment    Outcome Measure Options AM-PAC 6 Clicks Basic Mobility (PT)  -KR AM-PAC 6 Clicks Daily Activity (OT)  -       User Key  (r) = Recorded By, (t) = Taken By, (c) = Cosigned By    Initials Name Provider Type     Brea Miles, OTR Occupational Therapist    LR Adrianne Fernandez, PT Physical Therapist    MC Coco Monahan, OT Occupational Therapist    KR Narcisa Cheek, PT  Physical Therapist    VG Margi Lauren, PT Physical Therapist           Time Calculation:         PT Charges     Row Name 07/03/18 1025             Time Calculation    Start Time 1025  -VG      PT Received On 07/03/18  -VG      PT Goal Re-Cert Due Date 07/13/18  -VG         Time Calculation- PT    Total Timed Code Minutes- PT 38 minute(s)  -VG         Timed Charges    29322 - PT Therapeutic Exercise Minutes 13  -VG      82465 - Gait Training Minutes  25  -VG        User Key  (r) = Recorded By, (t) = Taken By, (c) = Cosigned By    Initials Name Provider Type    VG Margi Lauren, PT Physical Therapist        Therapy Suggested Charges     Code   Minutes Charges    47233 (CPT®) Hc Pt Neuromusc Re Education Ea 15 Min      03647 (CPT®) Hc Pt Ther Proc Ea 15 Min 13 1    74926 (CPT®) Hc Gait Training Ea 15 Min 25 2    90824 (CPT®) Hc Pt Therapeutic Act Ea 15 Min      25442 (CPT®) Hc Pt Manual Therapy Ea 15 Min      50011 (CPT®) Hc Pt Iontophoresis Ea 15 Min      06218 (CPT®) Hc Pt Elec Stim Ea-Per 15 Min      97373 (CPT®) Hc Pt Ultrasound Ea 15 Min      67865 (CPT®) Hc Pt Self Care/Mgmt/Train Ea 15 Min      Total  38 3        Therapy Charges for Today     Code Description Service Date Service Provider Modifiers Qty    38866147870 HC PT THER PROC EA 15 MIN 7/3/2018 Margi Lauren, PT GP 1    54529147851 HC GAIT TRAINING EA 15 MIN 7/3/2018 Margi Lauren, PT GP 2          PT G-Codes  PT Professional Judgement Used?: Yes  Outcome Measure Options: AM-PAC 6 Clicks Basic Mobility (PT)  Score: 14  Functional Limitation: Mobility: Walking and moving around  Mobility: Walking and Moving Around Current Status (): At least 40 percent but less than 60 percent impaired, limited or restricted  Mobility: Walking and Moving Around Goal Status (): At least 1 percent but less than 20 percent impaired, limited or restricted    Michelle Lauren, PT  7/3/2018

## 2018-07-03 NOTE — PROGRESS NOTES
"NEUROSURGERY PROGRESS NOTE    Vital Signs  Blood pressure (!) 203/88, pulse 79, temperature 98.8 °F (37.1 °C), temperature source Oral, resp. rate 16, height 182.9 cm (72.01\"), weight 117 kg (257 lb 15 oz), SpO2 98 %.    Interval History:   Patient complaining of pain today. Would like to try increasing pain medication regiment    No acute events overnight. Per nursing staff, [facility] will not accept patient due to the fact they had a sitter.     ASSESSMENT: POD#11  LUMBAR LAMINECTOMY L2-5    PLAN:   - Looking for rehab placement  - Increased pain medication from Q6 hours to Q4 hours to attempt to control pain. Nursing staff aware that we may decrease back to Q6 hours if patient's mental status deteriorates     Coco Solis PA-C  07/03/18  9:51 AM    "

## 2018-07-04 LAB
GLUCOSE BLDC GLUCOMTR-MCNC: 154 MG/DL (ref 70–130)
GLUCOSE BLDC GLUCOMTR-MCNC: 161 MG/DL (ref 70–130)
GLUCOSE BLDC GLUCOMTR-MCNC: 197 MG/DL (ref 70–130)
GLUCOSE BLDC GLUCOMTR-MCNC: 207 MG/DL (ref 70–130)

## 2018-07-04 PROCEDURE — 99024 POSTOP FOLLOW-UP VISIT: CPT | Performed by: NEUROLOGICAL SURGERY

## 2018-07-04 PROCEDURE — 82962 GLUCOSE BLOOD TEST: CPT

## 2018-07-04 PROCEDURE — 97116 GAIT TRAINING THERAPY: CPT

## 2018-07-04 PROCEDURE — 99232 SBSQ HOSP IP/OBS MODERATE 35: CPT | Performed by: INTERNAL MEDICINE

## 2018-07-04 PROCEDURE — 63710000001 FLINTSTONES COMPLETE 60 MG CHEWABLE TABLET: Performed by: NEUROLOGICAL SURGERY

## 2018-07-04 PROCEDURE — 97535 SELF CARE MNGMENT TRAINING: CPT | Performed by: OCCUPATIONAL THERAPIST

## 2018-07-04 PROCEDURE — 63710000001 INSULIN DETEMIR PER 5 UNITS: Performed by: NURSE PRACTITIONER

## 2018-07-04 RX ORDER — AMLODIPINE BESYLATE 5 MG/1
5 TABLET ORAL
Status: DISCONTINUED | OUTPATIENT
Start: 2018-07-04 | End: 2018-07-04

## 2018-07-04 RX ORDER — AMLODIPINE BESYLATE 5 MG/1
5 TABLET ORAL
Status: DISCONTINUED | OUTPATIENT
Start: 2018-07-04 | End: 2018-07-05 | Stop reason: HOSPADM

## 2018-07-04 RX ORDER — AMLODIPINE BESYLATE 10 MG/1
10 TABLET ORAL
Status: DISCONTINUED | OUTPATIENT
Start: 2018-07-04 | End: 2018-07-04

## 2018-07-04 RX ADMIN — OXYCODONE HYDROCHLORIDE AND ACETAMINOPHEN 1 TABLET: 5; 325 TABLET ORAL at 14:43

## 2018-07-04 RX ADMIN — CARVEDILOL 6.25 MG: 6.25 TABLET, FILM COATED ORAL at 08:26

## 2018-07-04 RX ADMIN — FAMOTIDINE 20 MG: 20 TABLET ORAL at 08:26

## 2018-07-04 RX ADMIN — SENNOSIDES AND DOCUSATE SODIUM 1 TABLET: 8.6; 5 TABLET ORAL at 20:13

## 2018-07-04 RX ADMIN — SENNOSIDES AND DOCUSATE SODIUM 1 TABLET: 8.6; 5 TABLET ORAL at 08:27

## 2018-07-04 RX ADMIN — BISACODYL 10 MG: 5 TABLET, COATED ORAL at 08:26

## 2018-07-04 RX ADMIN — SERTRALINE HYDROCHLORIDE 150 MG: 100 TABLET ORAL at 20:13

## 2018-07-04 RX ADMIN — INSULIN LISPRO 3 UNITS: 100 INJECTION, SOLUTION INTRAVENOUS; SUBCUTANEOUS at 11:57

## 2018-07-04 RX ADMIN — AMLODIPINE BESYLATE 5 MG: 5 TABLET ORAL at 11:56

## 2018-07-04 RX ADMIN — OXYCODONE HYDROCHLORIDE AND ACETAMINOPHEN 1 TABLET: 5; 325 TABLET ORAL at 04:50

## 2018-07-04 RX ADMIN — OXYCODONE HYDROCHLORIDE AND ACETAMINOPHEN 1 TABLET: 5; 325 TABLET ORAL at 01:13

## 2018-07-04 RX ADMIN — OXYCODONE HYDROCHLORIDE AND ACETAMINOPHEN 1 TABLET: 5; 325 TABLET ORAL at 08:27

## 2018-07-04 RX ADMIN — PANTOPRAZOLE SODIUM 40 MG: 40 TABLET, DELAYED RELEASE ORAL at 20:13

## 2018-07-04 RX ADMIN — INSULIN DETEMIR 10 UNITS: 100 INJECTION, SOLUTION SUBCUTANEOUS at 08:24

## 2018-07-04 RX ADMIN — POLYETHYLENE GLYCOL (3350) 17 G: 17 POWDER, FOR SOLUTION ORAL at 08:26

## 2018-07-04 RX ADMIN — INSULIN LISPRO 2 UNITS: 100 INJECTION, SOLUTION INTRAVENOUS; SUBCUTANEOUS at 17:16

## 2018-07-04 RX ADMIN — Medication 5 MG: at 20:13

## 2018-07-04 RX ADMIN — OXYBUTYNIN CHLORIDE 5 MG: 5 TABLET ORAL at 20:13

## 2018-07-04 RX ADMIN — CARVEDILOL 6.25 MG: 6.25 TABLET, FILM COATED ORAL at 17:16

## 2018-07-04 RX ADMIN — GABAPENTIN 300 MG: 300 CAPSULE ORAL at 08:27

## 2018-07-04 RX ADMIN — FUROSEMIDE 20 MG: 40 TABLET ORAL at 08:26

## 2018-07-04 RX ADMIN — INSULIN LISPRO 2 UNITS: 100 INJECTION, SOLUTION INTRAVENOUS; SUBCUTANEOUS at 20:13

## 2018-07-04 RX ADMIN — INSULIN LISPRO 2 UNITS: 100 INJECTION, SOLUTION INTRAVENOUS; SUBCUTANEOUS at 08:32

## 2018-07-04 RX ADMIN — IPRATROPIUM BROMIDE 2 SPRAY: 42 SPRAY NASAL at 14:43

## 2018-07-04 RX ADMIN — OXYCODONE HYDROCHLORIDE AND ACETAMINOPHEN 1 TABLET: 5; 325 TABLET ORAL at 19:12

## 2018-07-04 RX ADMIN — LISINOPRIL 40 MG: 40 TABLET ORAL at 08:27

## 2018-07-04 RX ADMIN — PANTOPRAZOLE SODIUM 40 MG: 40 TABLET, DELAYED RELEASE ORAL at 08:26

## 2018-07-04 RX ADMIN — FAMOTIDINE 20 MG: 20 TABLET ORAL at 20:13

## 2018-07-04 RX ADMIN — ATORVASTATIN CALCIUM 10 MG: 10 TABLET, FILM COATED ORAL at 08:26

## 2018-07-04 RX ADMIN — SERTRALINE HYDROCHLORIDE 100 MG: 100 TABLET ORAL at 08:27

## 2018-07-04 RX ADMIN — TAMSULOSIN HYDROCHLORIDE 0.4 MG: 0.4 CAPSULE ORAL at 08:27

## 2018-07-04 RX ADMIN — FINASTERIDE 5 MG: 5 TABLET, FILM COATED ORAL at 08:28

## 2018-07-04 RX ADMIN — Medication 5 MG: at 01:58

## 2018-07-04 RX ADMIN — TAMSULOSIN HYDROCHLORIDE 0.4 MG: 0.4 CAPSULE ORAL at 20:13

## 2018-07-04 RX ADMIN — Medication 1 TABLET: at 08:26

## 2018-07-04 RX ADMIN — GABAPENTIN 400 MG: 400 CAPSULE ORAL at 20:13

## 2018-07-04 RX ADMIN — LABETALOL HYDROCHLORIDE 10 MG: 5 INJECTION INTRAVENOUS at 04:50

## 2018-07-04 NOTE — PLAN OF CARE
Problem: Patient Care Overview  Goal: Plan of Care Review  Outcome: Ongoing (interventions implemented as appropriate)   07/04/18 1471   Coping/Psychosocial   Plan of Care Reviewed With patient   OTHER   Outcome Summary Pt with decreased step length bilaterally along with difficulty keeping rwx within safe distance to self. Pt CGA for bed mobility, min A for mobility w/rwx and max A for donning brace and cuing for maintaining spinal precautions

## 2018-07-04 NOTE — PLAN OF CARE
Problem: Patient Care Overview  Goal: Plan of Care Review  Outcome: Ongoing (interventions implemented as appropriate)   07/04/18 7042   Coping/Psychosocial   Plan of Care Reviewed With patient   Plan of Care Review   Progress improving   OTHER   Outcome Summary Patient more oriented today, although is confused at times. Pain managed well with PO. New IV in place, 22g in RAC for Labetalol as needed. Started on Norvasc today. Still having frequent urination r/t Lasix with episodes of incontinence. Has been up in the chair today with brace on. Plan to d/c to rehab pending bed availability, ambulance scheduled for 7/5.

## 2018-07-04 NOTE — THERAPY TREATMENT NOTE
Acute Care - Occupational Therapy Treatment Note  [facility]     Patient Name: Sai Main  : 1945  MRN: [id number]  Today's Date: 2018  Onset of Illness/Injury or Date of Surgery: 18  Date of Referral to OT: 18  Referring Physician: MD Pepe    Admit Date: 2018       ICD-10-CM ICD-9-CM   1. Impaired functional mobility, balance, gait, and endurance Z74.09 V49.89   2. Impaired mobility and ADLs Z74.09 799.89     Patient Active Problem List   Diagnosis   • Episode of change in speech   • Spinal stenosis of lumbar region with neurogenic claudication   • Radicular pain of both lower extremities   • Chronic bilateral low back pain with bilateral sciatica   • DDD (degenerative disc disease), lumbar   • Spondylosis of lumbar region without myelopathy or radiculopathy   • Congestive heart failure (CMS/[facility])   • IDDM (insulin dependent diabetes mellitus) (CMS/[facility])   • Insomnia   • Physical deconditioning   • Anxiety and depression   • At high risk for falls   • Gait disturbance   • Diabetic polyneuropathy associated with type 1 diabetes mellitus (CMS/[facility])   • Left foot drop   • Moderate obesity   • Spondylosis with myelopathy, lumbar region   • Spinal stenosis, lumbar region, with neurogenic claudication   • Degenerative disc disease, lumbar   • Osteoarthritis of spine with radiculopathy, lumbar region   • Spondylosis   • Impaired functional mobility, balance, gait, and endurance   • Metabolic encephalopathy   • AYE (acute kidney injury) (CMS/[facility])   • Anemia   • Hypotension   • Hypoglycemia     Past Medical History:   Diagnosis Date   • Anemia    • Arthritis    • Bleeding disorder (CMS/[facility])    • CHF (congestive heart failure) (CMS/[facility])    • DDD (degenerative disc disease), lumbar    • Diabetes mellitus (CMS/[facility])    • DJD (degenerative joint disease), lumbar    • Foot drop    • Heart murmur    • Hernia    • History of transfusion    • Hypertension    • Low back pain    • Neurogenic  claudication due to lumbar spinal stenosis    • Radiculopathy with lower extremity symptoms    • Spinal stenosis of lumbar region 2014   • Wears dentures     upper     Past Surgical History:   Procedure Laterality Date   • BUNIONECTOMY     • CARDIAC CATHETERIZATION      2 STENTS   • CARDIAC SURGERY      one blockage   • CERVICAL SPINE SURGERY     • COLONOSCOPY     • CORONARY ARTERY BYPASS GRAFT      Sees Dr. Scott @ [facility]   • ENDOSCOPY     • EYE SURGERY      with lens   • LUMBAR LAMINECTOMY DISCECTOMY DECOMPRESSION N/A 6/22/2018    Procedure: LUMBAR LAMINECTOMY L2-5;  Surgeon: Jose Cantu MD;  Location: [facility];  Service: Neurosurgery   • SKIN BIOPSY         Therapy Treatment          Rehabilitation Treatment Summary     Row Name 07/04/18 0909 07/04/18 0713          Treatment Time/Intention    Discipline physical therapist  -VG occupational therapist  -ST     Document Type therapy note (daily note)  -VG therapy note (daily note)  -ST     Subjective Information complains of;pain;fatigue  -VG complains of;pain;weakness;fatigue  -ST     Mode of Treatment individual therapy;physical therapy  -VG individual therapy;occupational therapy  -ST     Patient/Family Observations UIC, chair check, LSO donned; no family present.  -VG  --     Care Plan Review patient/other agree to care plan  -VG  --     Therapy Frequency (PT Clinical Impression) daily  -VG  --     Therapy Frequency (OT Eval)  -- daily  -ST     Patient Effort adequate  -VG good  -ST     Existing Precautions/Restrictions fall;spinal;brace worn when out of bed  -VG fall;brace worn when out of bed;spinal  -ST     Treatment Considerations/Comments  -- difficulty advancing B LE's this date, pt stating B ankle pain  -ST     Recorded by [VG] Margi Lauren, PT 07/04/18 0937 [ST] Brea Miles, OTR 07/04/18 1456     Row Name 07/04/18 0909 07/04/18 0713          Cognitive Assessment/Intervention- PT/OT    Affect/Mental Status (Cognitive) WNL  -VG WNL   -ST     Orientation Status (Cognition) oriented x 4  -VG oriented x 4  -ST     Follows Commands (Cognition) follows one step commands;over 90% accuracy  -VG follows one step commands;75-90% accuracy  -ST     Cognitive Function (Cognitive) attention deficit;memory deficit;safety deficit  -VG safety deficit  -ST     Attention Deficit (Cognitive) mild deficit  -VG mild deficit  -ST     Executive Function Deficit (Cognition) moderate deficit;organization/sequencing;planning/decision making;problem solving/reasoning  -VG  --     Memory Deficit (Cognitive) mild deficit  -VG mild deficit  -ST     Safety Deficit (Cognitive) mild deficit;safety precautions follow-through/compliance;safety precautions awareness  -VG mild deficit  -ST     Personal Safety Interventions fall prevention program maintained;gait belt;nonskid shoes/slippers when out of bed;supervised activity  -VG fall prevention program maintained;gait belt;nonskid shoes/slippers when out of bed  -ST     Recorded by [VG] Margi Lauren, PT 07/04/18 0937 [ST] Brea Mlies, OTR 07/04/18 1456     Row Name 07/04/18 0909             Safety Issues, Functional Mobility    Safety Issues Affecting Function (Mobility) sequencing abilities;safety precautions follow-through/compliance;safety precaution awareness  -VG      Impairments Affecting Function (Mobility) pain;endurance/activity tolerance  -VG      Comment, Safety Issues/Impairments (Mobility) Pt able to recall 2/3 spinal precautions  -VG      Recorded by [VG] Margi Lauren, PT 07/04/18 0937      Row Name 07/04/18 0909 07/04/18 0713          Bed Mobility Assessment/Treatment    Bed Mobility Assessment/Treatment sit-supine  -VG  --     Scooting/Bridging [place] (Bed Mobility)  -- supervision  -ST     Supine-Sit [place] (Bed Mobility)  -- minimum assist (75% patient effort)  -ST     Sit-Supine [place] (Bed Mobility) moderate assist (50% patient effort);verbal cues  -VG  --     Bed Mobility, Safety  Issues decreased use of arms for pushing/pulling;decreased use of legs for bridging/pushing  -VG  --     Assistive Device (Bed Mobility) bed rails  -VG bed rails;head of bed elevated  -ST     Comment (Bed Mobility) Cues for logroll, MODA required at BLEs for sit to supine.  -VG cuing for log roll technique and avoiding twisting   -ST     Recorded by [VG] Margi Lauren, PT 07/04/18 0937 [ST] Brea Miles, OTR 07/04/18 1456     Row Name 07/04/18 0713             Functional Mobility    Functional Mobility- Ind. Level minimum assist (75% patient effort)  -ST      Functional Mobility- Device rolling walker  -ST      Functional Mobility-Distance (Feet) 25  -ST      Functional Mobility- Comment cuing for maintaining rwx within safe distance to self   -ST      Recorded by [ST] Brea Miles, OTR 07/04/18 1456      Row Name 07/04/18 0909 07/04/18 0713          Transfer Assessment/Treatment    Transfer Assessment/Treatment sit-stand transfer;stand-sit transfer  -VG  --     Comment (Transfers) VC's for hand placement, sequencing, and AD placement.  -VG vc'ing for hand placement   -ST     Recorded by [VG] Margi Lauren, PT 07/04/18 0937 [ST] Brea Miles, OTR 07/04/18 1456     Row Name 07/04/18 0909 07/04/18 0713          Sit-Stand Transfer    Sit-Stand [place] (Transfers) verbal cues;contact guard  -VG minimum assist (75% patient effort)  -ST     Assistive Device (Sit-Stand Transfers) walker, front-wheeled  -VG walker, front-wheeled  -ST     Recorded by [VG] Margi Lauren, PT 07/04/18 0937 [ST] Brea Miles, OTR 07/04/18 1456     Row Name 07/04/18 0909 07/04/18 0713          Stand-Sit Transfer    Stand-Sit [place] (Transfers) verbal cues;contact guard  -VG contact guard  -ST     Assistive Device (Stand-Sit Transfers) walker, front-wheeled  -VG walker, front-wheeled  -ST     Recorded by [VG] Margi Lauren, PT 07/04/18 0937 [ST] Brea Miles, OTR 07/04/18 1456     Row Name 07/04/18 0741              Toilet Transfer    [place] Level (Toilet Transfer) contact guard  -ST      Assistive Device (Toilet Transfer) walker, front-wheeled  -ST      Recorded by [ST] Brea Miles, OTR 07/04/18 1456      Row Name 07/04/18 0909             Gait/Stairs Assessment/Training    84178 - Gait Training Minutes  15  -VG      Gait/Stairs Assessment/Training gait/ambulation independence;gait/ambulation assistive device;distance ambulated;gait pattern;gait deviations;maintains weight-bearing status  -VG      [place]enn Level (Gait) contact guard;verbal cues  -VG      Assistive Device (Gait) walker, front-wheeled  -VG      Distance in Feet (Gait) 10  -VG      Pattern (Gait) step-through;step-to  -VG      Deviations/Abnormal Patterns (Gait) bilateral deviations;jackie decreased;gait speed decreased;stride length decreased  -VG      Bilateral Gait Deviations forward flexed posture;heel strike decreased  -VG      Comment (Gait/Stairs) Pt refuses gait, 10 ft from chair to bed during transfer. Distance limited by pt c/o pain in B feet 9/10, inc with wbing. Cues for upright posture, sequencing, AD placement, and backing up to transfer surface before sitting.  -VG      Recorded by [VG] Margi Lauren, PT 07/04/18 0937      Row Name 07/04/18 0713             ADL Assessment/Intervention    72109 - OT Self Care/Mgmt Minutes 24  -ST      BADL Assessment/Intervention toileting  -ST      Recorded by [ST] Brea Miles, OTR 07/04/18 1456      Row Name 07/04/18 0713             Upper Body Dressing Assessment/Training    Upper Body Dressing Position --   max A for donning LSO brace   -ST      Recorded by [ST] Brea Miles, OTR 07/04/18 1456      Row Name 07/04/18 0713             Grooming Assessment/Training    [place]enn Level (Grooming) wash face, hands;set up  -ST      Grooming Position sink side  -ST      Recorded by [ST] Brea Miles, OTR 07/04/18 1456      Row Name 07/04/18 0713             Toileting  Assessment/Training    [place] Level (Toileting) perform perineal hygiene;moderate assist (50% patient effort)  -ST      Assistive Devices (Toileting) raised toilet seat  -ST      Toileting Position supported standing  -ST      Recorded by [ST] Brea Miles, OTR 07/04/18 1456      Row Name 07/04/18 0909             Therapeutic Exercise    Comment (Therapeutic Exercise) Pt refuses HEP this date despite MAX encouragement. Reports he is having too much pain. Prompted with written HEP, encouraged pt to complete exercises on his own when pain is better.  -VG      Recorded by [VG] Margi Lauren, PT 07/04/18 0937      Row Name 07/04/18 0713             Static Sitting Balance    Level of [place] (Unsupported Sitting, Static Balance) independent  -ST      Sitting Position (Unsupported Sitting, Static Balance) sitting on edge of bed  -ST      Time Able to Maintain Position (Unsupported Sitting, Static Balance) 3 to 4 minutes  -ST      Recorded by [ST] Brea Miles, OTR 07/04/18 1456      Row Name 07/04/18 0713             Dynamic Sitting Balance    Level of [place], Reaches Outside Midline (Sitting, Dynamic Balance) independent  -ST      Recorded by [ST] Brea Miles, OTR 07/04/18 1456      Row Name 07/04/18 0713             Dynamic Standing Balance    Level of [place], Reaches Outside Midline (Standing, Dynamic Balance) contact guard assist  -ST      Recorded by [ST] Brea Miles, OTR 07/04/18 1456      Row Name 07/04/18 0909 07/04/18 0713          Positioning and Restraints    Pre-Treatment Position sitting in chair/recliner  -VG in bed  -ST     Post Treatment Position bed  -VG chair  -ST     In Bed fowlers;call light within reach;encouraged to call for assist;exit alarm on;side rails up x3;heels elevated  -VG  --     In Chair  -- notified nsg;reclined;call light within reach;encouraged to call for assist;exit alarm on;legs elevated   brace intact  -ST     Recorded by [VG] Margi LIM  Xin, PT 07/04/18 0937 [ST] Brea NEWMAN Ginger, OTR 07/04/18 1456     Row Name 07/04/18 0909 07/04/18 0713          Pain Scale: Numbers Pre/Post-Treatment    Pain Scale: Numbers, Pretreatment 9/10  -VG 5/10  -ST     Pain Scale: Numbers, Post-Treatment 9/10  -VG 5/10  -ST     Pain Location - Side Bilateral  -VG  --     Pain Location ankle  -VG back  -ST     Pain Intervention(s) Repositioned;Ambulation/increased activity   Nsg reports pain med administered 20 min prior to session.  -VG  --     Recorded by [VG] Margi Lauren, PT 07/04/18 0937 [ST] Brea NEWMAN Ginger, OTR 07/04/18 1456     Row Name                Wound 06/22/18 1347 Other (See comments) back incision    Wound - Properties Group Date first assessed: 06/22/18 [KS] Time first assessed: 1347 [KS] Side: Other (See comments) [KS] Location: back [KS] Type: incision [KS] Recorded by:  [KS] Hemalatha Gomez RN 06/22/18 1347    Row Name                Wound 06/22/18 1347 Other (See comments) back incision    Wound - Properties Group Date first assessed: 06/22/18 [KS] Time first assessed: 1347 [KS] Side: Other (See comments) [KS] Location: back [KS] Type: incision [KS] Recorded by:  [KS] Hemalatha Gomez RN 06/22/18 1347    Row Name 07/04/18 0909             Coping    Observed Emotional State accepting;cooperative  -VG      Verbalized Emotional State acceptance  -VG      Recorded by [VG] Margi Lauren, PT 07/04/18 0937      Row Name 07/04/18 0909             Plan of Care Review    Plan of Care Reviewed With patient  -VG      Recorded by [VG] Margi Lauren, PT 07/04/18 0937      Row Name 07/04/18 0909             Outcome Summary/Treatment Plan (PT)    Daily Summary of Progress (PT) progress toward functional goals is gradual  -VG      Anticipated Equipment Needs at Discharge (PT) other (see comments)   defer to rehab  -VG      Anticipated Discharge Disposition (PT) skilled nursing facility  -VG      Recorded by [VG] Margi Lauren, PT 07/04/18 0978         User Key  (r) = Recorded By, (t) = Taken By, (c) = Cosigned By    Initials Name Effective Dates Discipline    ST Brea Miles, OTR 06/10/18 -  OT    DEXTER Gomez, RN 05/09/17 -  Nurse    JESUS Lauren, PT 05/29/18 -  PT        Wound 06/22/18 1347 Other (See comments) back incision (Active)   Dressing Appearance dry;intact 7/4/2018  8:26 AM   Closure Adhesive closure strips 7/3/2018  8:01 PM   Base pink 7/3/2018  8:01 PM   Periwound dry;intact;pink 7/3/2018  8:01 PM   Periwound Temperature warm 7/3/2018  8:01 PM   Drainage Amount none 7/3/2018  8:01 PM   Care, Wound other (see comments) 7/4/2018  8:26 AM         Occupational Therapy Education     Title: PT OT SLP Therapies (Active)     Topic: Occupational Therapy (Done)     Point: ADL training (Done)     Description: Instruct learner(s) on proper safety adaptation and remediation techniques during self care or transfers.   Instruct in proper use of assistive devices.   Learning Progress Summary     Learner Status Readiness Method Response Comment Documented by    Patient Done Acceptance E,TB,D VU,DU rwx safety, benefits of activity, ADLs, spinal precautions, bed mobility, back brace  07/04/18 1456     Done Acceptance E,TB VU   07/04/18 0420     Done Acceptance E,TB VU   07/04/18 0411     Done Acceptance E,TB,D VU,DU spinal precautions, log roll, AE, LBD, benefits of activity  07/03/18 1516     Done Acceptance E,TB VU   07/03/18 0321     Done Acceptance E,TB,D VU,DU spinal precautions, donning brace and reinforcement for wear schedule, ADLs, balance, rwx safety, transfers  07/02/18 1434     Active Acceptance E NR   07/01/18 1503     Active Acceptance D,E NR log roll, LSO needs, ADLs, transfers, bed mobility, feeding  06/28/18 1524     Active Acceptance E,D NR spinal precautions, command following, grooming, HEP, orientation  06/26/18 1508     Active Acceptance E,D,TB NR LB dressing ADL retraining with AE of sock aid and reacher,  requires reinforcement did demonstrate learning  06/25/18 1144     Done Acceptance E,TB,H,D VU,DU role of OT, benefits of activity, spinal precautions, LBD/AE, transfers, rwx use and safety, balance, brace and wear schedule ST 06/23/18 1621          Point: Home exercise program (Done)     Description: Instruct learner(s) on appropriate technique for monitoring, assisting and/or progressing therapeutic exercises/activities.   Learning Progress Summary     Learner Status Readiness Method Response Comment Documented by    Patient Done Acceptance E,TB,D VU,DU rwx safety, benefits of activity, ADLs, spinal precautions, bed mobility, back brace  07/04/18 1456     Done Acceptance E,TB VU   07/04/18 0420     Done Acceptance E,TB VU   07/04/18 0411     Done Acceptance E,TB,D VU,DU spinal precautions, log roll, AE, LBD, benefits of activity  07/03/18 1516     Done Acceptance E,TB VU   07/03/18 0321     Done Acceptance E,TB,D VU,DU spinal precautions, donning brace and reinforcement for wear schedule, ADLs, balance, rwx safety, transfers  07/02/18 1434     Active Acceptance D,E NR log roll, LSO needs, ADLs, transfers, bed mobility, feeding ST 06/28/18 1524     Active Acceptance E,D NR spinal precautions, command following, grooming, HEP, orientation  06/26/18 1508     Done Acceptance E,TB,H,D VU,DU role of OT, benefits of activity, spinal precautions, LBD/AE, transfers, rwx use and safety, balance, brace and wear schedule  06/23/18 1621          Point: Precautions (Done)     Description: Instruct learner(s) on prescribed precautions during self-care and functional transfers.   Learning Progress Summary     Learner Status Readiness Method Response Comment Documented by    Patient Done Acceptance E,TB,D VU,DU rwx safety, benefits of activity, ADLs, spinal precautions, bed mobility, back brace  07/04/18 1456     Done Acceptance E,TB VU   07/04/18 0420     Done Acceptance E,TB VU   07/04/18 0411     Done  Acceptance E,TB,D VU,DU spinal precautions, log roll, AE, LBD, benefits of activity  07/03/18 1516     Done Acceptance E,TB VU   07/03/18 0321     Done Acceptance E,TB,D VU,DU spinal precautions, donning brace and reinforcement for wear schedule, ADLs, balance, rwx safety, transfers  07/02/18 1434     Active Acceptance E NR   07/01/18 1503     Active Acceptance D,E NR log roll, LSO needs, ADLs, transfers, bed mobility, feeding  06/28/18 1524     Active Acceptance E,D,TB NR LB dressing ADL retraining with AE of sock aid and reacher, requires reinforcement did demonstrate learning  06/25/18 1144     Done Acceptance E,TB,H,D VU,DU role of OT, benefits of activity, spinal precautions, LBD/AE, transfers, rwx use and safety, balance, brace and wear schedule  06/23/18 1621          Point: Body mechanics (Done)     Description: Instruct learner(s) on proper positioning and spine alignment during self-care, functional mobility activities and/or exercises.   Learning Progress Summary     Learner Status Readiness Method Response Comment Documented by    Patient Done Acceptance E,TB,D VU,DU rwx safety, benefits of activity, ADLs, spinal precautions, bed mobility, back brace  07/04/18 1456     Done Acceptance E,TB VU   07/04/18 0420     Done Acceptance E,TB VU   07/04/18 0411     Done Acceptance E,TB,D VU,DU spinal precautions, log roll, AE, LBD, benefits of activity  07/03/18 1516     Done Acceptance E,TB VU   07/03/18 0321     Done Acceptance E,TB,D VU,DU spinal precautions, donning brace and reinforcement for wear schedule, ADLs, balance, rwx safety, transfers  07/02/18 1434     Active Acceptance E NR   07/01/18 1503     Active Acceptance D,E NR log roll, LSO needs, ADLs, transfers, bed mobility, feeding  06/28/18 1524     Active Acceptance E,D,TB NR LB dressing ADL retraining with AE of sock aid and reacher, requires reinforcement did demonstrate learning  06/25/18 1144     Done Acceptance  E,TB,H,D JOSE MANUEL PETERS role of OT, benefits of activity, spinal precautions, LBD/AE, transfers, rwx use and safety, balance, brace and wear schedule  06/23/18 1621                      User Key     Initials Effective Dates Name Provider Type Discipline     06/10/18 -  Brea Miles, OTR Occupational Therapist OT    JF 05/31/18 -  Chuck Castro, RNA Registered Nurse Nurse     03/14/16 -  Coco Monahan, OT Occupational Therapist OT    KF 04/03/18 -  Ashley Atwood, OT Occupational Therapist OT                OT Recommendation and Plan  Outcome Summary/Treatment Plan (OT)  Anticipated Equipment Needs at Discharge (OT): tub bench, raised toilet seat  Anticipated Discharge Disposition (OT): inpatient rehabilitation facility, skilled nursing facility  Planned Therapy Interventions (OT Eval): adaptive equipment training, functional balance retraining, occupation/activity based interventions, patient/caregiver education/training, strengthening exercise, transfer/mobility retraining  Therapy Frequency (OT Eval): daily  Plan of Care Review  Plan of Care Reviewed With: patient  Plan of Care Reviewed With: patient  Outcome Summary: Pt with decreased step length bilaterally along with difficulty keeping rwx within safe distance to self. Pt CGA for bed mobility, min A for mobility w/rwx and max A for donning brace and cuing for maintaining spinal precautions         Outcome Measures     Row Name 07/04/18 0909 07/04/18 0713 07/03/18 1433       How much help from another person do you currently need...    Turning from your back to your side while in flat bed without using bedrails? 3  -VG  --  --    Moving from lying on back to sitting on the side of a flat bed without bedrails? 3  -VG  --  --    Moving to and from a bed to a chair (including a wheelchair)? 3  -VG  --  --    Standing up from a chair using your arms (e.g., wheelchair, bedside chair)? 3  -VG  --  --    Climbing 3-5 steps with a railing? 2  -VG  --  --    To walk  in hospital room? 3  -VG  --  --    AM-PAC 6 Clicks Score 17  -VG  --  --       How much help from another is currently needed...    Putting on and taking off regular lower body clothing?  -- 3  -ST 3  -ST    Bathing (including washing, rinsing, and drying)  -- 3  -ST 3  -ST    Toileting (which includes using toilet bed pan or urinal)  -- 2  -ST 2  -ST    Putting on and taking off regular upper body clothing  -- 3  -ST 3  -ST    Taking care of personal grooming (such as brushing teeth)  -- 4  -ST 4  -ST    Eating meals  -- 4  -ST 4  -ST    Score  -- 19 -ST 19  -ST       Functional Assessment    Outcome Measure Options AM-[facility] 6 Clicks Basic Mobility (PT)  -VG  --  --    Row Name 07/03/18 1025 07/02/18 0928 07/02/18 0920       How much help from another person do you currently need...    Turning from your back to your side while in flat bed without using bedrails? 3  -VG  -- 3  -LR    Moving from lying on back to sitting on the side of a flat bed without bedrails? 3  -VG  -- 3  -LR    Moving to and from a bed to a chair (including a wheelchair)? 3  -VG  -- 3  -LR    Standing up from a chair using your arms (e.g., wheelchair, bedside chair)? 3  -VG  -- 3  -LR    Climbing 3-5 steps with a railing? 2  -VG  -- 2  -LR    To walk in hospital room? 3  -VG  -- 3  -LR    AM-PAC 6 Clicks Score 17  -VG  -- 17  -LR       How much help from another is currently needed...    Putting on and taking off regular lower body clothing?  -- 3  -ST  --    Bathing (including washing, rinsing, and drying)  -- 3  -ST  --    Toileting (which includes using toilet bed pan or urinal)  -- 2  -ST  --    Putting on and taking off regular upper body clothing  -- 3  -ST  --    Taking care of personal grooming (such as brushing teeth)  -- 4  -ST  --    Eating meals  -- 4  -ST  --    Score  -- 19 -ST  --       Functional Assessment    Outcome Measure Options AM-PAC 6 Clicks Basic Mobility (PT)  -VG  -- AM-PAC 6 Clicks Basic Mobility (PT)  -LR    Row  Name 07/01/18 1511             How much help from another person do you currently need...    Turning from your back to your side while in flat bed without using bedrails? 3  -KR      Moving from lying on back to sitting on the side of a flat bed without bedrails? 3  -KR      Moving to and from a bed to a chair (including a wheelchair)? 3  -KR      Standing up from a chair using your arms (e.g., wheelchair, bedside chair)? 3  -KR      Climbing 3-5 steps with a railing? 2  -KR      To walk in hospital room? 3  -KR      AM-PAC 6 Clicks Score 17  -KR         Functional Assessment    Outcome Measure Options AM-PAC 6 Clicks Basic Mobility (PT)  -KR        User Key  (r) = Recorded By, (t) = Taken By, (c) = Cosigned By    Initials Name Provider Type    ST Brea Miles, OTR Occupational Therapist    PAU Fernandez, PT Physical Therapist    KR Narcisa Cheek, PT Physical Therapist    VG Margi Lauren, PT Physical Therapist           Time Calculation:         Time Calculation- OT     Row Name 07/04/18 0909 07/04/18 0713          Time Calculation- OT    OT Start Time  -- 0713  -ST     OT Received On  -- 07/04/18  -ST     OT Goal Re-Cert Due Date  -- 07/13/18  -ST        Timed Charges    38538 - Gait Training Minutes  15  -VG  --     01842 - OT Self Care/Mgmt Minutes  -- 24  -ST       User Key  (r) = Recorded By, (t) = Taken By, (c) = Cosigned By    Initials Name Provider Type    ST Brea Miles, OTR Occupational Therapist    JESUS Lauren, PT Physical Therapist           Therapy Suggested Charges     Code   Minutes Charges    18647 (CPT®) Hc Ot Neuromusc Re Education Ea 15 Min      78681 (CPT®) Hc Ot Ther Proc Ea 15 Min      55670 (CPT®) Hc Gait Training Ea 15 Min      99380 (CPT®) Hc Ot Therapeutic Act Ea 15 Min      46293 (CPT®) Hc Ot Manual Therapy Ea 15 Min      93307 (CPT®) Hc Ot Iontophoresis Ea 15 Min      56374 (CPT®) Hc Ot Elec Stim Ea-Per 15 Min      79519 (CPT®) Hc Ot Ultrasound Ea 15 Min       47550 (CPT®) Hc Ot Self Care/Mgmt/Train Ea 15 Min 24 2    Total  24 2        Therapy Charges for Today     Code Description Service Date Service Provider Modifiers Qty    57271742727 HC OT SELF CARE/MGMT/TRAIN EA 15 MIN 7/3/2018 CHARLOTTE Reynaga GO 1    07867845485 HC OT SELF CARE/MGMT/TRAIN EA 15 MIN 7/4/2018 CHARLOTTE Reynaga GO 2          OT G-codes  OT Professional Judgement Used?: Yes  OT Functional Scales Options: AM-PAC 6 Clicks Daily Activity (OT)  Score: 12  Functional Limitation: Self care  Self Care Current Status (): At least 60 percent but less than 80 percent impaired, limited or restricted  Self Care Goal Status (): At least 40 percent but less than 60 percent impaired, limited or restricted    CHARLOTTE Day  7/4/2018

## 2018-07-04 NOTE — PROGRESS NOTES
Continued Stay Note  [facility]     Patient Name: Sai Main  MRN: [id number]  Today's Date: 7/4/2018    Admit Date: 6/22/2018          Discharge Plan     Row Name 07/04/18 1130       Plan    Plan SNF    Plan Comments Case mgt unable to get a determination from [facility] or [facility] re: transfer. Therefore [facility] ambulance rescheduled for 7/5 at 2pm. Case mgt will f/u in am with both facilities to determine if either can accept into a skilled bed. Discussd with Mr Main and significant other at bedside.              Discharge Codes    No documentation.       Expected Discharge Date and Time     Expected Discharge Date Expected Discharge Time    Jul 3, 2018             Sonja C Kellerman, RN

## 2018-07-04 NOTE — PLAN OF CARE
Problem: Patient Care Overview  Goal: Plan of Care Review  Outcome: Ongoing (interventions implemented as appropriate)   07/04/18 0420   Coping/Psychosocial   Plan of Care Reviewed With patient   Plan of Care Review   Progress no change   OTHER   Outcome Summary Pt A&Ox4 with no episodes of confusion. BP still hypertensive, but no interventions required. Pt requiring 1 to 2 assist w/ a back brace when out of bed. Pain well controlled with q4 PO percocet. Pt restless this night, PO melatonin given w/ good results. Pt eager for d/c to continue rehab.        Problem: Fall Risk (Adult)  Goal: Identify Related Risk Factors and Signs and Symptoms  Outcome: Ongoing (interventions implemented as appropriate)      Problem: Laminectomy/Foraminotomy/Discectomy (Adult)  Goal: Signs and Symptoms of Listed Potential Problems Will be Absent, Minimized or Managed (Laminectomy/Foraminotomy/Discectomy)  Outcome: Ongoing (interventions implemented as appropriate)      Problem: Skin Injury Risk (Adult)  Goal: Identify Related Risk Factors and Signs and Symptoms  Outcome: Ongoing (interventions implemented as appropriate)    Goal: Skin Health and Integrity  Outcome: Ongoing (interventions implemented as appropriate)

## 2018-07-04 NOTE — PROGRESS NOTES
"NEUROSURGERY PROGRESS NOTE    Vital Signs  Blood pressure (!) 191/80, pulse 76, temperature 98.6 °F (37 °C), temperature source Axillary, resp. rate 16, height 182.9 cm (72.01\"), weight 117 kg (257 lb 15 oz), SpO2 98 %.    Interval History:   POD #12: Laminectomy L3, L4, L5.    Quite alert today.  Does not appear confused.  Lumbar incision dry.    Awaiting confirmation of discharge and transfer.  As of yesterday afternoon,  did not have confirmation of any admission approval.      ASSESSMENT: POD #12: Laminectomy L3, L4, L5.    PLAN: Awaiting approval for transferred for subacute care, facility as yet undetermined.    Zhao Sahu MD  07/04/18  6:59 AM    "

## 2018-07-04 NOTE — PLAN OF CARE
Problem: Patient Care Overview  Goal: Plan of Care Review   07/04/18 0938   Coping/Psychosocial   Plan of Care Reviewed With patient   Plan of Care Review   Progress declining   OTHER   Outcome Summary Pt refused ambulation and HEP this date d/t inc pain (9/10) in B ankles/feet. Agreed to transfer to bed. Amb 10 ft during transfer with CGA and RW, cues for sequencing. Continues to required MODA for sit to supine at BLEs. Able to recall 2/3 spinal precautions. Recommend D/c to SNF once bed available.

## 2018-07-04 NOTE — PROGRESS NOTES
Adult Nutrition  Assessment/PES    Patient Name:  Sai Main  YOB: 1945  MRN: [id number]  Admit Date:  6/22/2018    Assessment Date:  7/4/2018    Comments:            Reason for Assessment     Row Name 07/04/18 1239          Reason for Assessment    Reason For Assessment follow-up protocol   15 mins     Diagnosis --   per notes this adm                       Nutrition Prescription Ordered     Row Name 07/04/18 1241 07/04/18 1240       Nutrition Prescription PO    Current PO Diet  -- Regular    Supplement Boost Plus  --    Supplement Frequency 2 times a day  --    Common Modifiers  -- Consistent Carbohydrate            Evaluation of Received Nutrient/Fluid Intake     Row Name 07/04/18 1241          PO Evaluation    Number of Meals 4     % PO Intake 88             Problem/Interventions:        Problem 1     Row Name 07/04/18 1241          Nutrition Diagnoses Problem 1    Problem 1 Nutrition Appropriate for Condition at this Time     Etiology (related to) MNT for Treatment/Condition     Signs/Symptoms (evidenced by) PO Intake     Percent (%) intake recorded 88 %     Over number of meals 4                     Intervention Goal     Row Name 07/04/18 1241          Intervention Goal    PO Maintain intake             Nutrition Intervention     Row Name 07/04/18 1241          Nutrition Intervention    RD/Tech Action Follow Tx progress;Supplement provided               Education/Evaluation     Row Name 07/04/18 1241          Monitor/Evaluation    Monitor Per protocol         Electronically signed by:  Radha Reyes MS,RD,LD  07/04/18 12:41 PM

## 2018-07-04 NOTE — THERAPY TREATMENT NOTE
Acute Care - Physical Therapy Treatment Note  [facility]     Patient Name: Sai Main  : 1945  MRN: [id number]  Today's Date: 2018  Onset of Illness/Injury or Date of Surgery: 18  Date of Referral to PT: 18  Referring Physician: MD Pepe    Admit Date: 2018    Visit Dx:    ICD-10-CM ICD-9-CM   1. Impaired functional mobility, balance, gait, and endurance Z74.09 V49.89   2. Impaired mobility and ADLs Z74.09 799.89     Patient Active Problem List   Diagnosis   • Episode of change in speech   • Spinal stenosis of lumbar region with neurogenic claudication   • Radicular pain of both lower extremities   • Chronic bilateral low back pain with bilateral sciatica   • DDD (degenerative disc disease), lumbar   • Spondylosis of lumbar region without myelopathy or radiculopathy   • Congestive heart failure (CMS/HCC)   • IDDM (insulin dependent diabetes mellitus) (CMS/[facility])   • Insomnia   • Physical deconditioning   • Anxiety and depression   • At high risk for falls   • Gait disturbance   • Diabetic polyneuropathy associated with type 1 diabetes mellitus (CMS/HCC)   • Left foot drop   • Moderate obesity   • Spondylosis with myelopathy, lumbar region   • Spinal stenosis, lumbar region, with neurogenic claudication   • Degenerative disc disease, lumbar   • Osteoarthritis of spine with radiculopathy, lumbar region   • Spondylosis   • Impaired functional mobility, balance, gait, and endurance   • Metabolic encephalopathy   • AYE (acute kidney injury) (CMS/[facility])   • Anemia   • Hypotension   • Hypoglycemia       Therapy Treatment          Rehabilitation Treatment Summary     Row Name 18 0909             Treatment Time/Intention    Discipline physical therapist  -VG      Document Type therapy note (daily note)  -VG      Subjective Information complains of;pain;fatigue  -VG      Mode of Treatment individual therapy;physical therapy  -VG      Patient/Family Observations UIC, chair check, LSO  donned; no family present.  -VG      Care Plan Review patient/other agree to care plan  -VG      Therapy Frequency (PT Clinical Impression) daily  -VG      Patient Effort adequate  -VG      Existing Precautions/Restrictions fall;spinal;brace worn when out of bed  -VG      Recorded by [VG] Margi Lauren, PT 07/04/18 0937      Row Name 07/04/18 0909             Cognitive Assessment/Intervention- PT/OT    Affect/Mental Status (Cognitive) WNL  -VG      Orientation Status (Cognition) oriented x 4  -VG      Follows Commands (Cognition) follows one step commands;over 90% accuracy  -VG      Cognitive Function (Cognitive) attention deficit;memory deficit;safety deficit  -VG      Attention Deficit (Cognitive) mild deficit  -VG      Executive Function Deficit (Cognition) moderate deficit;organization/sequencing;planning/decision making;problem solving/reasoning  -VG      Memory Deficit (Cognitive) mild deficit  -VG      Safety Deficit (Cognitive) mild deficit;safety precautions follow-through/compliance;safety precautions awareness  -VG      Personal Safety Interventions fall prevention program maintained;gait belt;nonskid shoes/slippers when out of bed;supervised activity  -VG      Recorded by [VG] Margi Lauren, PT 07/04/18 0937      Row Name 07/04/18 0909             Safety Issues, Functional Mobility    Safety Issues Affecting Function (Mobility) sequencing abilities;safety precautions follow-through/compliance;safety precaution awareness  -VG      Impairments Affecting Function (Mobility) pain;endurance/activity tolerance  -VG      Comment, Safety Issues/Impairments (Mobility) Pt able to recall 2/3 spinal precautions  -VG      Recorded by [VG] Margi Lauren, PT 07/04/18 0937      Row Name 07/04/18 0909             Bed Mobility Assessment/Treatment    Bed Mobility Assessment/Treatment sit-supine  -VG      Sit-Supine [place] (Bed Mobility) moderate assist (50% patient effort);verbal cues  -VG      Bed Mobility,  Safety Issues decreased use of arms for pushing/pulling;decreased use of legs for bridging/pushing  -VG      Assistive Device (Bed Mobility) bed rails  -VG      Comment (Bed Mobility) Cues for logroll, MODA required at BLEs for sit to supine.  -VG      Recorded by [VG] Margi Lauren, PT 07/04/18 0937      Row Name 07/04/18 0909             Transfer Assessment/Treatment    Transfer Assessment/Treatment sit-stand transfer;stand-sit transfer  -VG      Comment (Transfers) VC's for hand placement, sequencing, and AD placement.  -VG      Recorded by [VG] Margi Lauren, PT 07/04/18 0937      Row Name 07/04/18 0909             Sit-Stand Transfer    Sit-Stand [place] (Transfers) verbal cues;contact guard  -VG      Assistive Device (Sit-Stand Transfers) walker, front-wheeled  -VG      Recorded by [VG] Margi Lauren, PT 07/04/18 0937      Row Name 07/04/18 0909             Stand-Sit Transfer    Stand-Sit [place] (Transfers) verbal cues;contact guard  -VG      Assistive Device (Stand-Sit Transfers) walker, front-wheeled  -VG      Recorded by [VG] Margi Lauren, PT 07/04/18 0937      Row Name 07/04/18 0909             Gait/Stairs Assessment/Training    35320 - Gait Training Minutes  15  -VG      Gait/Stairs Assessment/Training gait/ambulation independence;gait/ambulation assistive device;distance ambulated;gait pattern;gait deviations;maintains weight-bearing status  -VG      [place] Level (Gait) contact guard;verbal cues  -VG      Assistive Device (Gait) walker, front-wheeled  -VG      Distance in Feet (Gait) 10  -VG      Pattern (Gait) step-through;step-to  -VG      Deviations/Abnormal Patterns (Gait) bilateral deviations;jackie decreased;gait speed decreased;stride length decreased  -VG      Bilateral Gait Deviations forward flexed posture;heel strike decreased  -VG      Comment (Gait/Stairs) Pt refuses gait, 10 ft from chair to bed during transfer. Distance limited by pt c/o pain in B feet 9/10, inc  with wbing. Cues for upright posture, sequencing, AD placement, and backing up to transfer surface before sitting.  -VG      Recorded by [VG] Margi Lauren, PT 07/04/18 0937      Row Name 07/04/18 0909             Therapeutic Exercise    Comment (Therapeutic Exercise) Pt refuses HEP this date despite MAX encouragement. Reports he is having too much pain. Prompted with written HEP, encouraged pt to complete exercises on his own when pain is better.  -VG      Recorded by [VG] Margi Lauren, PT 07/04/18 0937      Row Name 07/04/18 0909             Positioning and Restraints    Pre-Treatment Position sitting in chair/recliner  -VG      Post Treatment Position bed  -VG      In Bed fowlers;call light within reach;encouraged to call for assist;exit alarm on;side rails up x3;heels elevated  -VG      Recorded by [VG] Margi Lauren, PT 07/04/18 0937      Row Name 07/04/18 0909             Pain Scale: Numbers Pre/Post-Treatment    Pain Scale: Numbers, Pretreatment 9/10  -VG      Pain Scale: Numbers, Post-Treatment 9/10  -VG      Pain Location - Side Bilateral  -VG      Pain Location ankle  -VG      Pain Intervention(s) Repositioned;Ambulation/increased activity   Nsg reports pain med administered 20 min prior to session.  -VG      Recorded by [VG] Margi Lauren, PT 07/04/18 0937      Row Name                Wound 06/22/18 1347 Other (See comments) back incision    Wound - Properties Group Date first assessed: 06/22/18 [KS] Time first assessed: 1347 [KS] Side: Other (See comments) [KS] Location: back [KS] Type: incision [KS] Recorded by:  [KS] Hemalatha Gomez RN 06/22/18 1347    Row Name                Wound 06/22/18 1347 Other (See comments) back incision    Wound - Properties Group Date first assessed: 06/22/18 [KS] Time first assessed: 1347 [KS] Side: Other (See comments) [KS] Location: back [KS] Type: incision [KS] Recorded by:  [KS] Hemalatha Gomez RN 06/22/18 1347    Row Name 07/04/18 0909             Coping     Observed Emotional State accepting;cooperative  -VG      Verbalized Emotional State acceptance  -VG      Recorded by [VG] Margi Lauren, PT 07/04/18 0937      Row Name 07/04/18 0909             Plan of Care Review    Plan of Care Reviewed With patient  -VG      Recorded by [VG] Margi Lauren, PT 07/04/18 0937      Row Name 07/04/18 0909             Outcome Summary/Treatment Plan (PT)    Daily Summary of Progress (PT) progress toward functional goals is gradual  -VG      Anticipated Equipment Needs at Discharge (PT) other (see comments)   defer to rehab  -VG      Anticipated Discharge Disposition (PT) skilled nursing facility  -VG      Recorded by [VG] Margi Lauren, PT 07/04/18 0937        User Key  (r) = Recorded By, (t) = Taken By, (c) = Cosigned By    Initials Name Effective Dates Discipline    KS Hemalatha Gomez, RN 05/09/17 -  Nurse    VG Margi Lauren, PT 05/29/18 -  PT          Wound 06/22/18 1347 Other (See comments) back incision (Active)   Dressing Appearance dry;intact 7/4/2018  8:26 AM   Closure Adhesive closure strips 7/3/2018  8:01 PM   Base pink 7/3/2018  8:01 PM   Periwound dry;intact;pink 7/3/2018  8:01 PM   Periwound Temperature warm 7/3/2018  8:01 PM   Drainage Amount none 7/3/2018  8:01 PM   Care, Wound other (see comments) 7/4/2018  8:26 AM   Dressing Care, Wound dressing changed;border dressing 7/3/2018  1:00 PM             Physical Therapy Education     Title: PT OT SLP Therapies (Active)     Topic: Physical Therapy (Active)     Point: Mobility training (Active)    Learning Progress Summary     Learner Status Readiness Method Response Comment Documented by    Patient Active Acceptance E NR Educated on correct mechanics for safe functional mobility. Able to recall 2/3 spinal precautions. Reinforcement needed. VG 07/04/18 0937     Active Acceptance E NR Educated on HEP, correct mechanics for safe functional mobility, and spinal precautions; reinforcement needed. VG 07/03/18 1118      Active Acceptance E,D NR Educated on spinal precautions, correct log rolling technique, correct gait mechanics, and HEP. LR 07/02/18 1016     Active Acceptance E NR  KR 07/01/18 1623     Done Acceptance E TITUS PETERS   06/30/18 1112     Active Acceptance E NR Educated on proper mechanics for safe functional mobility. Needs reinforcement d/t cognition.  06/28/18 1406     Active Acceptance E,D NR Reviewed back precautions, safety with mobility/transfers, HEP.  06/27/18 1557     Active Acceptance E NR,NL BACK PRECAUTIONS, IMPORTANCE OF HAVING BRACE UP AT ALL TIMES WHEN OOB. CD 06/26/18 1109     Done Acceptance E,D LORRAINE,NR Reviewed benefits of activity, spinal precautions, log roll technique, correct gait mechanics, HEP.  06/25/18 0906     Done Acceptance E,D [place],NR Reviewed spinal precautions, correct gait mechanics, HEP, benefits of activity.  06/24/18 1352     Active Acceptance E,D,H NR Reviewed spinal precautions, log roll technique, performing ankle pumps throughout the day, correct gait mechanics, safety with mobility and transfers, wear schedule for lumbar brace.  06/23/18 1457    Significant Other Done Acceptance E,D LORRAINE,NR Reviewed spinal precautions, correct gait mechanics, HEP, benefits of activity.  06/24/18 1352     Active Acceptance E,D,H NR Reviewed spinal precautions, log roll technique, performing ankle pumps throughout the day, correct gait mechanics, safety with mobility and transfers, wear schedule for lumbar brace.  06/23/18 1457          Point: Home exercise program (Active)    Learning Progress Summary     Learner Status Readiness Method Response Comment Documented by    Patient Active Acceptance E NR Educated on correct mechanics for safe functional mobility. Able to recall 2/3 spinal precautions. Reinforcement needed.  07/04/18 0937     Active Acceptance E NR Educated on HEP, correct mechanics for safe functional mobility, and spinal precautions; reinforcement needed.  07/03/18 1114      Active Acceptance E,D NR Educated on spinal precautions, correct log rolling technique, correct gait mechanics, and HEP. LR 07/02/18 1016     Active Acceptance E NR  KR 07/01/18 1623     Done Acceptance E LORRAINE,NR   06/30/18 1112     Active Acceptance E NR Educated on proper mechanics for safe functional mobility. Needs reinforcement d/t cognition.  06/28/18 1406     Active Acceptance E,D NR Reviewed back precautions, safety with mobility/transfers, HEP.  06/27/18 1557     Active Acceptance E NR,NL BACK PRECAUTIONS, IMPORTANCE OF HAVING BRACE UP AT ALL TIMES WHEN OOB. CD 06/26/18 1109     Done Acceptance E,D LORRAINE,NR Reviewed benefits of activity, spinal precautions, log roll technique, correct gait mechanics, HEP.  06/25/18 0906     Done Acceptance E,D LORRAINE,NR Reviewed spinal precautions, correct gait mechanics, HEP, benefits of activity.  06/24/18 1352     Active Acceptance E,D,H NR Reviewed spinal precautions, log roll technique, performing ankle pumps throughout the day, correct gait mechanics, safety with mobility and transfers, wear schedule for lumbar brace.  06/23/18 1457    Significant Other Done Acceptance E,D LORRAINE,NR Reviewed spinal precautions, correct gait mechanics, HEP, benefits of activity.  06/24/18 1352     Active Acceptance E,D,H NR Reviewed spinal precautions, log roll technique, performing ankle pumps throughout the day, correct gait mechanics, safety with mobility and transfers, wear schedule for lumbar brace.  06/23/18 1457          Point: Body mechanics (Active)    Learning Progress Summary     Learner Status Readiness Method Response Comment Documented by    Patient Active Acceptance E NR Educated on correct mechanics for safe functional mobility. Able to recall 2/3 spinal precautions. Reinforcement needed.  07/04/18 0937     Active Acceptance E NR Educated on HEP, correct mechanics for safe functional mobility, and spinal precautions; reinforcement needed.  07/03/18 1114      Active Acceptance E,D NR Educated on spinal precautions, correct log rolling technique, correct gait mechanics, and HEP. LR 07/02/18 1016     Active Acceptance E NR  KR 07/01/18 1623     Done Acceptance E TITUS PETERS   06/30/18 1112     Active Acceptance E NR Educated on proper mechanics for safe functional mobility. Needs reinforcement d/t cognition.  06/28/18 1406     Active Acceptance E,D NR Reviewed back precautions, safety with mobility/transfers, HEP.  06/27/18 1557     Active Acceptance E NR,NL BACK PRECAUTIONS, IMPORTANCE OF HAVING BRACE UP AT ALL TIMES WHEN OOB. CD 06/26/18 1109     Done Acceptance E,D LORRAINE,NR Reviewed benefits of activity, spinal precautions, log roll technique, correct gait mechanics, HEP.  06/25/18 0906     Done Acceptance E,D [place]OR[place],NR Reviewed spinal precautions, correct gait mechanics, HEP, benefits of activity.  06/24/18 1352     Active Acceptance E,D,H NR Reviewed spinal precautions, log roll technique, performing ankle pumps throughout the day, correct gait mechanics, safety with mobility and transfers, wear schedule for lumbar brace.  06/23/18 1457    Significant Other Done Acceptance E,D [place],NR Reviewed spinal precautions, correct gait mechanics, HEP, benefits of activity.  06/24/18 1352     Active Acceptance E,D,H NR Reviewed spinal precautions, log roll technique, performing ankle pumps throughout the day, correct gait mechanics, safety with mobility and transfers, wear schedule for lumbar brace.  06/23/18 1457          Point: Precautions (Active)    Learning Progress Summary     Learner Status Readiness Method Response Comment Documented by    Patient Active Acceptance E NR Educated on correct mechanics for safe functional mobility. Able to recall 2/3 spinal precautions. Reinforcement needed.  07/04/18 0937     Active Acceptance E NR Educated on HEP, correct mechanics for safe functional mobility, and spinal precautions; reinforcement needed.  07/03/18 1114     Active  Acceptance E,D NR Educated on spinal precautions, correct log rolling technique, correct gait mechanics, and HEP. LR 07/02/18 1016     Active Acceptance E NR  KR 07/01/18 1623     Done Acceptance E LORRAINE,TITUS   06/30/18 1112     Active Acceptance E NR Educated on proper mechanics for safe functional mobility. Needs reinforcement d/t cognition.  06/28/18 1406     Active Acceptance E,D NR Reviewed back precautions, safety with mobility/transfers, HEP.  06/27/18 1557     Active Acceptance E NR,NL BACK PRECAUTIONS, IMPORTANCE OF HAVING BRACE UP AT ALL TIMES WHEN OOB. CD 06/26/18 1109     Done Acceptance E,D VU,NR Reviewed benefits of activity, spinal precautions, log roll technique, correct gait mechanics, HEP.  06/25/18 0906     Done Acceptance E,D LORRAINE,NR Reviewed spinal precautions, correct gait mechanics, HEP, benefits of activity.  06/24/18 1352     Active Acceptance E,D,H NR Reviewed spinal precautions, log roll technique, performing ankle pumps throughout the day, correct gait mechanics, safety with mobility and transfers, wear schedule for lumbar brace.  06/23/18 1457    Significant Other Done Acceptance E,D VU,NR Reviewed spinal precautions, correct gait mechanics, HEP, benefits of activity.  06/24/18 1352     Active Acceptance E,D,H NR Reviewed spinal precautions, log roll technique, performing ankle pumps throughout the day, correct gait mechanics, safety with mobility and transfers, wear schedule for lumbar brace.  06/23/18 1457                      User Key     Initials Effective Dates Name Provider Type Discipline    CD 06/19/15 -  Stephanie Boyd, PT Physical Therapist PT    JM 06/19/15 -  Chad Lizama, PT Physical Therapist PT     06/19/15 -  Adrianne Fernandez, PT Physical Therapist PT    KR 04/03/18 -  Narcisa Cheek, PT Physical Therapist PT    LM 04/03/18 -  Kathleen Barrios, PT Physical Therapist PT    VG 05/29/18 -  Margi Lauren, PT Physical Therapist PT                    PT  Recommendation and Plan  Anticipated Discharge Disposition (PT): skilled nursing facility  Therapy Frequency (PT Clinical Impression): daily  Outcome Summary/Treatment Plan (PT)  Daily Summary of Progress (PT): progress toward functional goals is gradual  Anticipated Equipment Needs at Discharge (PT): other (see comments) (defer to rehab)  Anticipated Discharge Disposition (PT): skilled nursing facility  Plan of Care Reviewed With: patient  Progress: declining  Outcome Summary: Pt refused ambulation and HEP this date d/t inc pain (9/10) in B ankles/feet. Agreed to transfer to bed. Amb 10 ft during transfer with CGA and RW, cues for sequencing. Continues to required MODA for sit to supine at BLEs. Able to recall 2/3 spinal precautions. Recommend D/c to SNF once bed available.          Outcome Measures     Row Name 07/04/18 0909 07/03/18 1433 07/03/18 1025       How much help from another person do you currently need...    Turning from your back to your side while in flat bed without using bedrails? 3  -VG  -- 3  -VG    Moving from lying on back to sitting on the side of a flat bed without bedrails? 3  -VG  -- 3  -VG    Moving to and from a bed to a chair (including a wheelchair)? 3  -VG  -- 3  -VG    Standing up from a chair using your arms (e.g., wheelchair, bedside chair)? 3  -VG  -- 3  -VG    Climbing 3-5 steps with a railing? 2  -VG  -- 2  -VG    To walk in hospital room? 3  -VG  -- 3  -VG    AM-PAC 6 Clicks Score 17  -VG  -- 17  -VG       How much help from another is currently needed...    Putting on and taking off regular lower body clothing?  -- 3  -ST  --    Bathing (including washing, rinsing, and drying)  -- 3  -ST  --    Toileting (which includes using toilet bed pan or urinal)  -- 2  -ST  --    Putting on and taking off regular upper body clothing  -- 3  -ST  --    Taking care of personal grooming (such as brushing teeth)  -- 4  -ST  --    Eating meals  -- 4  -ST  --    Score  -- 19  -ST  --        Functional Assessment    Outcome Measure Options [facility] 6 Clicks Basic Mobility (PT)  -VG  -- AM-PAC 6 Clicks Basic Mobility (PT)  -VG    Row Name 07/02/18 0928 07/02/18 0920 07/01/18 1511       How much help from another person do you currently need...    Turning from your back to your side while in flat bed without using bedrails?  -- 3  -LR 3  -KR    Moving from lying on back to sitting on the side of a flat bed without bedrails?  -- 3  -LR 3  -KR    Moving to and from a bed to a chair (including a wheelchair)?  -- 3  -LR 3  -KR    Standing up from a chair using your arms (e.g., wheelchair, bedside chair)?  -- 3  -LR 3  -KR    Climbing 3-5 steps with a railing?  -- 2  -LR 2  -KR    To walk in hospital room?  -- 3  -LR 3  -KR    AM-[facility] 6 Clicks Score  -- 17  -LR 17  -KR       How much help from another is currently needed...    Putting on and taking off regular lower body clothing? 3  -ST  --  --    Bathing (including washing, rinsing, and drying) 3  -ST  --  --    Toileting (which includes using toilet bed pan or urinal) 2  -ST  --  --    Putting on and taking off regular upper body clothing 3  -ST  --  --    Taking care of personal grooming (such as brushing teeth) 4  -ST  --  --    Eating meals 4  -ST  --  --    Score 19  -ST  --  --       Functional Assessment    Outcome Measure Options  -- AM-PAC 6 Clicks Basic Mobility (PT)  -LR [facility] 6 Clicks Basic Mobility (PT)  -KR    Row Name 07/01/18 1440             How much help from another is currently needed...    Putting on and taking off regular lower body clothing? 2  -MC      Bathing (including washing, rinsing, and drying) 2  -MC      Toileting (which includes using toilet bed pan or urinal) 2  -MC      Putting on and taking off regular upper body clothing 2  -MC      Taking care of personal grooming (such as brushing teeth) 2  -MC      Eating meals 2  -MC      Score 12  -MC         Functional Assessment    Outcome Measure Options AM-PAC 6 Clicks Daily  Activity (OT)  -        User Key  (r) = Recorded By, (t) = Taken By, (c) = Cosigned By    Initials Name Provider Type    ST Brea Miles, OTR Occupational Therapist    LR Adrianne Fernandez, PT Physical Therapist    MC Coco Monahan, OT Occupational Therapist    DIEGO Cheek, PT Physical Therapist    VG Margi Lauren, PT Physical Therapist           Time Calculation:         PT Charges     Row Name 07/04/18 0909             Time Calculation    Start Time 0909  -VG      PT Received On 07/04/18  -VG      PT Goal Re-Cert Due Date 07/13/18  -VG         Time Calculation- PT    Total Timed Code Minutes- PT 15 minute(s)  -VG         Timed Charges    68794 - Gait Training Minutes  15  -VG        User Key  (r) = Recorded By, (t) = Taken By, (c) = Cosigned By    Initials Name Provider Type    VG Margi Lauren, PT Physical Therapist        Therapy Suggested Charges     Code   Minutes Charges    28155 (CPT®) Hc Pt Neuromusc Re Education Ea 15 Min      11191 (CPT®) Hc Pt Ther Proc Ea 15 Min      24940 (CPT®) Hc Gait Training Ea 15 Min 15 1    84475 (CPT®) Hc Pt Therapeutic Act Ea 15 Min      37877 (CPT®) Hc Pt Manual Therapy Ea 15 Min      92117 (CPT®) Hc Pt Iontophoresis Ea 15 Min      13056 (CPT®) Hc Pt Elec Stim Ea-Per 15 Min      48280 (CPT®) Hc Pt Ultrasound Ea 15 Min      55301 (CPT®) Hc Pt Self Care/Mgmt/Train Ea 15 Min      Total  15 1        Therapy Charges for Today     Code Description Service Date Service Provider Modifiers Qty    98949140641 HC PT THER PROC EA 15 MIN 7/3/2018 Margi Lauren, PT GP 1    19232862914 HC GAIT TRAINING EA 15 MIN 7/3/2018 Margi Lauren, PT GP 2    96389785261 HC GAIT TRAINING EA 15 MIN 7/4/2018 Margi Lauren, PT GP 1          PT G-Codes  PT Professional Judgement Used?: Yes  Outcome Measure Options: AM-PAC 6 Clicks Basic Mobility (PT)  Score: 14  Functional Limitation: Mobility: Walking and moving around  Mobility: Walking and Moving Around Current Status (): At  least 40 percent but less than 60 percent impaired, limited or restricted  Mobility: Walking and Moving Around Goal Status (): At least 1 percent but less than 20 percent impaired, limited or restricted    Michelle Lauren, PT  7/4/2018

## 2018-07-04 NOTE — PROGRESS NOTES
[facility] Medicine Services  PROGRESS NOTE    Patient Name: Sai Main  : 1945  MRN: [id number]    Date of Admission: 2018  Length of Stay: 12  Primary Care Physician: Jose Pereira MD    Subjective   Subjective     CC:  F/U medical management    HPI:  Resting in bed in no acute distress but complains of some pain in his ankle joints particularly doing physical activity.  No fever or chills.  No nausea, vomiting, diarrhea, abdominal pain.    Review of Systems  CV-no chest pain, no palpitations  Resp-no cough, no dyspnea  GI-no N/V/D, no abd pain      Otherwise ROS is negative except as mentioned in the HPI.    Objective   Objective     Vital Signs:   Temp:  [96.5 °F (35.8 °C)-98.6 °F (37 °C)] 96.5 °F (35.8 °C)  Heart Rate:  [69-79] 71  Resp:  [16-17] 17  BP: (149-191)/(63-85) 149/63        Physical Exam:  Gen-no acute distress,asleep, easily arouses  CV-RRR, S1 S2 normal, grade III systolic blowing murmur  Resp-CTAB, normal WOB, on RA  Abd-soft, NT, ND, +BS  Ext-no edema, PPP. No redness or swelling of the ankle joints  Neuro-A&Ox3, no focal deficits  Psych-appropriate mood    Results Reviewed:  I have personally reviewed current lab, radiology, and data and agree.      Results from last 7 days  Lab Units 18  1301 18  0954 18  0407   WBC 10*3/mm3 10.01 10.48 7.49   HEMOGLOBIN g/dL 10.7* 11.3* 9.7*   HEMATOCRIT % 32.7* 34.7* 30.0*   PLATELETS 10*3/mm3 476* 435 299       Results from last 7 days  Lab Units 18  1301 18  0954 18  0407   SODIUM mmol/L 136 136 136   POTASSIUM mmol/L 3.6 4.2 4.8   CHLORIDE mmol/L 100 98* 104   CO2 mmol/L 26.0 23.0 20.0   BUN mg/dL 16 26* 32*   CREATININE mg/dL 0.87 1.07 1.50*   GLUCOSE mg/dL 187* 243* 123*   CALCIUM mg/dL 9.7 10.5* 10.3     Estimated Creatinine Clearance: 99.9 mL/min (by C-G formula based on SCr of 0.87 mg/dL).    Microbiology Results Abnormal     Procedure Component Value - Date/Time     Eosinophil Smear - Urine, Urine, Clean Catch [[id number]]  (Normal) Collected:  06/27/18 2227    Lab Status:  Final result Specimen:  Urine from Urine, Clean Catch Updated:  06/27/18 2338     Eosinophil Smear 0 % EOS/100 Cells     Narrative:       No eosinophil seen             I have reviewed the medications.    Assessment/Plan   Assessment / Plan     Hospital Problem List     Physical deconditioning    Spondylosis with myelopathy, lumbar region    Overview Signed 5/23/2018 11:36 AM by Malina Irving PA-C     Added automatically from request for surgery [id number]         Spinal stenosis, lumbar region, with neurogenic claudication    Overview Signed 5/23/2018 11:36 AM by SAMIR Douglass-C     Added automatically from request for surgery [id number]         Degenerative disc disease, lumbar    Overview Signed 5/23/2018 11:36 AM by ALICE DouglassC     Added automatically from request for surgery [id number]         Osteoarthritis of spine with radiculopathy, lumbar region    Overview Signed 5/23/2018 11:36 AM by ALICE DouglassC     Added automatically from request for surgery [id number]         Spondylosis    Impaired functional mobility, balance, gait, and endurance    Metabolic encephalopathy    AYE (acute kidney injury) (CMS/[facility])    Anemia    Hypotension    Hypoglycemia             Brief Hospital Course to date:  Sai Main is a 72 y.o. male who presented to the hospital on 6/22/18 for planned surgical intervention for lumbar spinal stenosis with Dr. Cantu. He has had complicated post-operative course including acute blood loss anemia requiring blood transfusion, confusion and disorientation, hypotension, hypoglycemia, and acute kidney injury. Hospitalist service consulted 6/27 for medical management.       Assessment & Plan:  --Post-op care per Dr. Cantu.  --Confusion has resolved, likely due to over-medication, post-op state, etc. Stop scheduled Haldol, consider stopping/decrease gabapentin  if confusion returns, also consider seroquel as Haldol order is hindering his transfer  --HgbA1c 5.7, continue insulin regimen, titrate prn  --AYE resolved with fluids. Probably due to hypotension, acute blood loss anemia. Renal ultrasound unremarkable. Repeat labs  --Increase Coreg, Lisinopril recently increased  --Anemia stable s/p 2 units PRBC.   -- ankle joint pain, bilaterally. Possible mild arthritis. No swelling, warmth, redness, or tenderness appreciated during PE      CODE STATUS:   Code Status and Medical Interventions:   Ordered at: 06/22/18 5251     Level Of Support Discussed With:    Patient     Code Status:    CPR     Medical Interventions (Level of Support Prior to Arrest):    Full       Disposition: I expect the patient to be discharged to [facility] per Neurosurgery      Electronically signed by Rolando Haq MD, 07/04/18, 5:22 PM.

## 2018-07-05 VITALS
HEIGHT: 72 IN | SYSTOLIC BLOOD PRESSURE: 158 MMHG | DIASTOLIC BLOOD PRESSURE: 69 MMHG | TEMPERATURE: 98.5 F | WEIGHT: 257.94 LBS | OXYGEN SATURATION: 95 % | RESPIRATION RATE: 12 BRPM | HEART RATE: 73 BPM | BODY MASS INDEX: 34.94 KG/M2

## 2018-07-05 LAB
GLUCOSE BLDC GLUCOMTR-MCNC: 191 MG/DL (ref 70–130)
GLUCOSE BLDC GLUCOMTR-MCNC: 246 MG/DL (ref 70–130)

## 2018-07-05 PROCEDURE — 99024 POSTOP FOLLOW-UP VISIT: CPT | Performed by: PHYSICIAN ASSISTANT

## 2018-07-05 PROCEDURE — G8979 MOBILITY GOAL STATUS: HCPCS

## 2018-07-05 PROCEDURE — G8980 MOBILITY D/C STATUS: HCPCS

## 2018-07-05 PROCEDURE — 63710000001 FLINTSTONES COMPLETE 60 MG CHEWABLE TABLET: Performed by: NEUROLOGICAL SURGERY

## 2018-07-05 PROCEDURE — G8978 MOBILITY CURRENT STATUS: HCPCS

## 2018-07-05 PROCEDURE — 82962 GLUCOSE BLOOD TEST: CPT

## 2018-07-05 PROCEDURE — 97110 THERAPEUTIC EXERCISES: CPT

## 2018-07-05 PROCEDURE — 97535 SELF CARE MNGMENT TRAINING: CPT | Performed by: OCCUPATIONAL THERAPIST

## 2018-07-05 PROCEDURE — 63710000001 INSULIN DETEMIR PER 5 UNITS: Performed by: NURSE PRACTITIONER

## 2018-07-05 PROCEDURE — 97116 GAIT TRAINING THERAPY: CPT

## 2018-07-05 RX ORDER — GABAPENTIN 400 MG/1
400 CAPSULE ORAL NIGHTLY
Qty: 30 CAPSULE | Refills: 0 | Status: SHIPPED | OUTPATIENT
Start: 2018-07-05 | End: 2019-01-10 | Stop reason: ALTCHOICE

## 2018-07-05 RX ORDER — OXYCODONE HYDROCHLORIDE AND ACETAMINOPHEN 5; 325 MG/1; MG/1
1 TABLET ORAL EVERY 4 HOURS PRN
Qty: 45 TABLET | Refills: 0 | Status: ON HOLD
Start: 2018-07-05 | End: 2022-11-30

## 2018-07-05 RX ADMIN — TAMSULOSIN HYDROCHLORIDE 0.4 MG: 0.4 CAPSULE ORAL at 08:18

## 2018-07-05 RX ADMIN — PANTOPRAZOLE SODIUM 40 MG: 40 TABLET, DELAYED RELEASE ORAL at 08:18

## 2018-07-05 RX ADMIN — FUROSEMIDE 20 MG: 40 TABLET ORAL at 08:17

## 2018-07-05 RX ADMIN — SERTRALINE HYDROCHLORIDE 100 MG: 100 TABLET ORAL at 08:17

## 2018-07-05 RX ADMIN — BISACODYL 10 MG: 5 TABLET, COATED ORAL at 08:17

## 2018-07-05 RX ADMIN — SENNOSIDES AND DOCUSATE SODIUM 1 TABLET: 8.6; 5 TABLET ORAL at 08:17

## 2018-07-05 RX ADMIN — FAMOTIDINE 20 MG: 20 TABLET ORAL at 08:18

## 2018-07-05 RX ADMIN — GABAPENTIN 300 MG: 300 CAPSULE ORAL at 08:17

## 2018-07-05 RX ADMIN — FINASTERIDE 5 MG: 5 TABLET, FILM COATED ORAL at 08:18

## 2018-07-05 RX ADMIN — LABETALOL HYDROCHLORIDE 10 MG: 5 INJECTION INTRAVENOUS at 04:31

## 2018-07-05 RX ADMIN — LISINOPRIL 40 MG: 40 TABLET ORAL at 08:18

## 2018-07-05 RX ADMIN — INSULIN DETEMIR 10 UNITS: 100 INJECTION, SOLUTION SUBCUTANEOUS at 08:16

## 2018-07-05 RX ADMIN — INSULIN LISPRO 2 UNITS: 100 INJECTION, SOLUTION INTRAVENOUS; SUBCUTANEOUS at 08:22

## 2018-07-05 RX ADMIN — INSULIN LISPRO 3 UNITS: 100 INJECTION, SOLUTION INTRAVENOUS; SUBCUTANEOUS at 11:34

## 2018-07-05 RX ADMIN — POLYETHYLENE GLYCOL (3350) 17 G: 17 POWDER, FOR SOLUTION ORAL at 08:18

## 2018-07-05 RX ADMIN — AMLODIPINE BESYLATE 5 MG: 5 TABLET ORAL at 08:17

## 2018-07-05 RX ADMIN — CARVEDILOL 6.25 MG: 6.25 TABLET, FILM COATED ORAL at 08:18

## 2018-07-05 RX ADMIN — OXYCODONE HYDROCHLORIDE AND ACETAMINOPHEN 1 TABLET: 5; 325 TABLET ORAL at 09:16

## 2018-07-05 RX ADMIN — Medication 1 TABLET: at 08:18

## 2018-07-05 RX ADMIN — ATORVASTATIN CALCIUM 10 MG: 10 TABLET, FILM COATED ORAL at 08:18

## 2018-07-05 NOTE — THERAPY DISCHARGE NOTE
Acute Care - Occupational Therapy Treatment Note/Discharge   [place]     Patient Name: Sai Main  : 1945  MRN: [id number]  Today's Date: 2018  Onset of Illness/Injury or Date of Surgery: 18  Date of Referral to OT: 18  Referring Physician: MD Pepe      Admit Date: 2018    Visit Dx:     ICD-10-CM ICD-9-CM   1. Impaired functional mobility, balance, gait, and endurance Z74.09 V49.89   2. Impaired mobility and ADLs Z74.09 799.89     Patient Active Problem List   Diagnosis   • Episode of change in speech   • Spinal stenosis of lumbar region with neurogenic claudication   • Radicular pain of both lower extremities   • Chronic bilateral low back pain with bilateral sciatica   • DDD (degenerative disc disease), lumbar   • Spondylosis of lumbar region without myelopathy or radiculopathy   • Congestive heart failure (CMS/HCC)   • IDDM (insulin dependent diabetes mellitus) (CMS/[facility])   • Insomnia   • Physical deconditioning   • Anxiety and depression   • At high risk for falls   • Gait disturbance   • Diabetic polyneuropathy associated with type 1 diabetes mellitus (CMS/HCC)   • Left foot drop   • Moderate obesity   • Spondylosis with myelopathy, lumbar region   • Spinal stenosis, lumbar region, with neurogenic claudication   • Degenerative disc disease, lumbar   • Osteoarthritis of spine with radiculopathy, lumbar region   • Spondylosis   • Impaired functional mobility, balance, gait, and endurance   • Metabolic encephalopathy   • AYE (acute kidney injury) (CMS/[facility])   • Anemia   • Hypotension   • Hypoglycemia       Therapy Treatment          Rehabilitation Treatment Summary     Row Name 18 0900 18 0738          Treatment Time/Intention    Discipline physical therapist  -VG occupational therapist  -ST     Document Type discharge treatment  -VG discharge treatment;therapy note (daily note)  -ST     Subjective Information complains of;pain  -VG complains of;fatigue  -ST      Mode of Treatment individual therapy;physical therapy  -VG individual therapy;occupational therapy  -ST     Patient/Family Observations In bed, bed check; no family present.  -VG UIB, no family present  -ST     Care Plan Review patient/other agree to care plan  -VG care plan/treatment goals reviewed;risks/benefits reviewed;current/potential barriers reviewed;patient/other agree to care plan  -ST     Therapy Frequency (PT Clinical Impression) daily  -VG  --     Therapy Frequency (OT Eval)  -- daily  -ST     Patient Effort good  -VG adequate  -ST     Existing Precautions/Restrictions fall;spinal;brace worn when out of bed  -VG fall;brace worn when out of bed;spinal  -ST     Recorded by [VG] Margi Lauren, PT 07/05/18 0954 [ST] Brea Miles, OTR 07/05/18 1506     Row Name 07/05/18 0900             Vital Signs    Post Systolic BP Rehab 150  -VG      Post Treatment Diastolic BP 73  -VG      Post Patient Position Sitting  -VG      Recorded by [VG] Margi Lauren, PT 07/05/18 0954      Row Name 07/05/18 0900 07/05/18 0738          Cognitive Assessment/Intervention- PT/OT    Affect/Mental Status (Cognitive) WNL  -VG WNL  -ST     Orientation Status (Cognition) oriented x 4  -VG oriented x 4  -ST     Follows Commands (Cognition) follows one step commands;over 90% accuracy  -VG follows one step commands;over 90% accuracy  -ST     Cognitive Function (Cognitive) attention deficit;memory deficit;safety deficit  -VG  --     Attention Deficit (Cognitive) mild deficit;requires cues/redirection to task  -VG  --     Executive Function Deficit (Cognition) mild deficit;organization/sequencing  -VG  --     Memory Deficit (Cognitive) mild deficit;working memory  -VG  --     Safety Deficit (Cognitive) mild deficit;safety precautions follow-through/compliance;safety precautions awareness  -VG  --     Personal Safety Interventions fall prevention program maintained;gait belt;nonskid shoes/slippers when out of bed;supervised activity   -VG fall prevention program maintained  -ST     Recorded by [VG] Margi Lauren, PT 07/05/18 0954 [ST] Brea Miles, OTR 07/05/18 1506     Row Name 07/05/18 0900             Safety Issues, Functional Mobility    Safety Issues Affecting Function (Mobility) safety precautions follow-through/compliance;safety precaution awareness;positioning of assistive device  -VG      Impairments Affecting Function (Mobility) pain  -VG      Comment, Safety Issues/Impairments (Mobility) Pt able to recall 3/3 spinal precautions.  -VG      Recorded by [VG] Margi Lauren, PT 07/05/18 0954      Row Name 07/05/18 0900 07/05/18 0738          Bed Mobility Assessment/Treatment    Bed Mobility Assessment/Treatment rolling left;rolling right;supine-sit  -VG  --     Rolling Left [place] (Bed Mobility) moderate assist (50% patient effort);verbal cues  -VG  --     Rolling Right [place] (Bed Mobility) moderate assist (50% patient effort);verbal cues  -VG  --     Supine-Sit [place] (Bed Mobility) minimum assist (75% patient effort);verbal cues  -VG  --     Bed Mobility, Safety Issues decreased use of arms for pushing/pulling;decreased use of legs for bridging/pushing  -VG  --     Assistive Device (Bed Mobility) bed rails  -VG  --     Comment (Bed Mobility) VC's for sequencing, no dizziness noted upon sitting EOB. VC's for logroll.  -VG refused  -ST     Recorded by [VG] Margi Lauren, PT 07/05/18 0954 [ST] Brea Miles, OTR 07/05/18 1506     Row Name 07/05/18 0738             Functional Mobility    Functional Mobility- Comment refused  -ST      Recorded by [ST] Brea Miles, OTR 07/05/18 1506      Row Name 07/05/18 0900 07/05/18 0738          Transfer Assessment/Treatment    Transfer Assessment/Treatment sit-stand transfer;stand-sit transfer;toilet transfer  -VG  --     Comment (Transfers) VC's for hand placement, turning and backing up to transfer surface, and maintaining spinal precautions. Completed toileting with  SBA for perineal hygeine.  -VG refused   -ST     Recorded by [VG] Margi Lauren, PT 07/05/18 0954 [ST] Brea NEWMAN Ginger, OTR 07/05/18 1506     Row Name 07/05/18 0900             Sit-Stand Transfer    Sit-Stand Mobile (Transfers) supervision;verbal cues  -VG      Assistive Device (Sit-Stand Transfers) walker, front-wheeled  -VG      Recorded by [VG] Margi Lauren, PT 07/05/18 0954      Row Name 07/05/18 0900             Stand-Sit Transfer    Stand-Sit Mobile (Transfers) supervision;verbal cues  -VG      Assistive Device (Stand-Sit Transfers) walker, front-wheeled  -VG      Recorded by [VG] Margi Lauren, PT 07/05/18 0954      Row Name 07/05/18 0900             Toilet Transfer    Type (Toilet Transfer) sit-stand;stand-sit  -VG      Mobile Level (Toilet Transfer) supervision;verbal cues  -VG      Assistive Device (Toilet Transfer) walker, front-wheeled;grab bars/safety frame;raised toilet seat  -VG      Recorded by [VG] Margi Lauren, PT 07/05/18 0954      Row Name 07/05/18 0900             Gait/Stairs Assessment/Training    59728 - Gait Training Minutes  30  -VG      Gait/Stairs Assessment/Training gait/ambulation independence;gait/ambulation assistive device;distance ambulated;gait pattern;gait deviations;maintains weight-bearing status  -VG      Mobile Level (Gait) contact guard;verbal cues  -VG      Assistive Device (Gait) walker, front-wheeled  -VG      Distance in Feet (Gait) 40  -VG      Pattern (Gait) step-through;step-to  -VG      Deviations/Abnormal Patterns (Gait) bilateral deviations;jackie decreased;gait speed decreased;stride length decreased  -VG      Bilateral Gait Deviations forward flexed posture;heel strike decreased  -VG      Comment (Gait/Stairs) VCs for AD placement, small steps during turns. Distance limited by pain in B feet. VC's for upright posture.  -VG      Recorded by [VG] Margi Lauren, PT 07/05/18 0954      Row Name 07/05/18 0738             ADL  Assessment/Intervention    30422 - OT Self Care/Mgmt Minutes 12  -ST      Recorded by [ST] Brea Miles, OTR 07/05/18 1506      Row Name 07/05/18 0738             Grooming Assessment/Training    [place] Level (Grooming) oral care regimen;wash face, hands;set up  -ST      Grooming Position sitting up in bed  -ST      Recorded by [ST] Brea Miles, OTR 07/05/18 1506      Row Name 07/05/18 0738             Toileting Assessment/Training    [place] Level (Toileting) set up;toileting skills  -ST      Assistive Devices (Toileting) urinal  -ST      Toileting Position sitting up in bed  -ST      Recorded by [ST] Brea Miles, OTR 07/05/18 1506      Row Name 07/05/18 0900             Therapeutic Exercise    99386 - PT Therapeutic Exercise Minutes 15  -VG      Recorded by [VG] Margi Lauren, PT 07/05/18 0954      Row Name 07/05/18 0900             Therapeutic Exercise    Lower Extremity (Therapeutic Exercise) gluteal sets;heel slides, bilateral;quad sets, bilateral  -VG      Lower Extremity Range of Motion (Therapeutic Exercise) hip internal/external rotation, bilateral;ankle dorsiflexion/plantar flexion, bilateral  -VG      Core Strength (Therapeutic Exercise) abdominal bracing  -VG      Exercise Type (Therapeutic Exercise) AROM (active range of motion)  -VG      Position (Therapeutic Exercise) supine  -VG      Sets/Reps (Therapeutic Exercise) 10x  -VG      Comment (Therapeutic Exercise) Per spinal protocol, vc's for technique.  -VG      Recorded by [VG] Margi Lauren, PT 07/05/18 0954      Row Name 07/05/18 0900             Dynamic Standing Balance    Level of [place], Reaches Outside Midline (Standing, Dynamic Balance) minimal assist, 75% patient effort  -VG      Time Able to Maintain Position, Reaches Outside Midline (Standing, Dynamic Balance) 1 to 2 minutes  -VG      Lower Extremity Device, Reaches Outside Midline (Standing, Dynamic Balance) orthosis  -VG      Comment, Reaches Outside  Midline (Standing, Dynamic Balance) Required KAHTY to maintain balance while performing hand hygeine.  -VG      Recorded by [VG] Margi Lauren, PT 07/05/18 0954      Row Name 07/05/18 0900 07/05/18 0738          Positioning and Restraints    Pre-Treatment Position in bed  -VG in bed  -ST     Post Treatment Position chair  -VG bed  -ST     In Bed  -- notified nsg;supine;call light within reach;encouraged to call for assist;exit alarm on;legs elevated  -ST     In Chair reclined;call light within reach;encouraged to call for assist;exit alarm on;notified nsg;waffle cushion;legs elevated  -VG  --     Recorded by [VG] Margi Lauren, PT 07/05/18 0954 [ST] Brea Miles, OTR 07/05/18 1506     Row Name 07/05/18 0900 07/05/18 0738          Pain Scale: Numbers Pre/Post-Treatment    Pain Scale: Numbers, Pretreatment 7/10  -VG 4/10  -ST     Pain Scale: Numbers, Post-Treatment 8/10  -VG 4/10  -ST     Pain Location - Side Bilateral  -VG  --     Pain Location ankle  -VG back  -ST     Pain Intervention(s) Repositioned;Ambulation/increased activity   nsg administered pain medication prior to mobility  -VG Repositioned;Medication (See MAR)  -ST     Recorded by [VG] Margi Lauren, PT 07/05/18 0954 [ST] Brea Miles, OTR 07/05/18 1506     Row Name 07/05/18 0900             Spinal Orthosis Management    Type (Spinal Orthosis) LSO (lumbar sacral orthosis)  -VG      Orthosis Training (Spinal Orthosis) patient;donning/doffing orthosis  -VG      Recorded by [VG] Margi Lauren, PT 07/05/18 0954      Row Name                Wound 06/22/18 1347 Other (See comments) back incision    Wound - Properties Group Date first assessed: 06/22/18 [KS] Time first assessed: 1347 [KS] Side: Other (See comments) [KS] Location: back [KS] Type: incision [KS] Recorded by:  [KS] Hemalatha Gomez RN 06/22/18 1347    Row Name                Wound 06/22/18 1347 Other (See comments) back incision    Wound - Properties Group Date first assessed:  06/22/18 [KS] Time first assessed: 1347 [KS] Side: Other (See comments) [KS] Location: back [KS] Type: incision [KS] Recorded by:  [KS] Hemalatha Gomez RN 06/22/18 1347    Row Name 07/05/18 0900             Coping    Observed Emotional State accepting  -VG      Verbalized Emotional State acceptance  -VG      Recorded by [VG] Margi Lauren, PT 07/05/18 0954      Row Name 07/05/18 0900             Plan of Care Review    Plan of Care Reviewed With patient  -VG      Recorded by [VG] Margi Lauren, PT 07/05/18 0954      Row Name 07/05/18 0900             Outcome Summary/Treatment Plan (PT)    Daily Summary of Progress (PT) progress toward functional goals is good  -VG      Anticipated Equipment Needs at Discharge (PT) --   defer to rehab  -VG      Anticipated Discharge Disposition (PT) [facility] nursing facility  -VG      Reason for Discharge (PT Discharge Summary) patient discharged from this facility  -VG      Recorded by [VG] Margi Lauren, PT 07/05/18 0954        User Key  (r) = Recorded By, (t) = Taken By, (c) = Cosigned By    Initials Name Effective Dates Discipline    ST Brea Miles, OTR 06/10/18 -  OT    DEXTER Gomez RN 05/09/17 -  Nurse    VG Margi Laurne, PT 05/29/18 -  PT        Wound 06/22/18 1347 Other (See comments) back incision (Active)   Dressing Appearance dry;intact 7/5/2018  8:22 AM   Closure Adhesive closure strips 7/5/2018 12:00 PM   Base pink 7/5/2018 12:00 PM   Periwound dry;pink 7/5/2018 12:00 PM   Dressing Care, Wound dressing changed 7/5/2018 12:00 PM           Occupational Therapy Education     Title: PT OT SLP Therapies (Done)     Topic: Occupational Therapy (Done)     Point: ADL training (Done)     Description: Instruct learner(s) on proper safety adaptation and remediation techniques during self care or transfers.   Instruct in proper use of assistive devices.   Learning Progress Summary     Learner Status Readiness Method Response Comment Documented by    Patient  Done Acceptance E,TB,D VU,DU benefits of activities, participation in activities, spinal precautions, ADLs  07/05/18 1507     Done Acceptance E,TB,D VU,DU rwx safety, benefits of activity, ADLs, spinal precautions, bed mobility, back brace  07/04/18 1456     Done Acceptance E,TB VU   07/04/18 0420     Done Acceptance E,TB VU   07/04/18 0411     Done Acceptance E,TB,D VU,DU spinal precautions, log roll, AE, LBD, benefits of activity  07/03/18 1516     Done Acceptance E,TB VU   07/03/18 0321     Done Acceptance E,TB,D VU,DU spinal precautions, donning brace and reinforcement for wear schedule, ADLs, balance, rwx safety, transfers  07/02/18 1434     Active Acceptance E NR   07/01/18 1503     Active Acceptance D,E NR log roll, LSO needs, ADLs, transfers, bed mobility, feeding  06/28/18 1524     Active Acceptance E,D NR spinal precautions, command following, grooming, HEP, orientation  06/26/18 1508     Active Acceptance E,D,TB NR LB dressing ADL retraining with AE of sock aid and reacher, requires reinforcement did demonstrate learning  06/25/18 1144     Done Acceptance E,TB,H,D VU,DU role of OT, benefits of activity, spinal precautions, LBD/AE, transfers, rwx use and safety, balance, brace and wear schedule  06/23/18 1621          Point: Home exercise program (Done)     Description: Instruct learner(s) on appropriate technique for monitoring, assisting and/or progressing therapeutic exercises/activities.   Learning Progress Summary     Learner Status Readiness Method Response Comment Documented by    Patient Done Acceptance E,TB,D VU,DU benefits of activities, participation in activities, spinal precautions, ADLs  07/05/18 1507     Done Acceptance E,TB,D VU,DU rwx safety, benefits of activity, ADLs, spinal precautions, bed mobility, back brace  07/04/18 1456     Done Acceptance E,TB VU   07/04/18 0420     Done Acceptance E,TB VU   07/04/18 0411     Done Acceptance E,TB,D VU,DU spinal  precautions, log roll, AE, LBD, benefits of activity  07/03/18 1516     Done Acceptance E,TB VU   07/03/18 0321     Done Acceptance E,TB,D VU,DU spinal precautions, donning brace and reinforcement for wear schedule, ADLs, balance, rwx safety, transfers  07/02/18 1434     Active Acceptance D,E NR log roll, LSO needs, ADLs, transfers, bed mobility, feeding  06/28/18 1524     Active Acceptance E,D NR spinal precautions, command following, grooming, HEP, orientation  06/26/18 1508     Done Acceptance E,TB,H,D VU,DU role of OT, benefits of activity, spinal precautions, LBD/AE, transfers, rwx use and safety, balance, brace and wear schedule  06/23/18 1621          Point: Precautions (Done)     Description: Instruct learner(s) on prescribed precautions during self-care and functional transfers.   Learning Progress Summary     Learner Status Readiness Method Response Comment Documented by    Patient Done Acceptance E,TB,D VU,DU benefits of activities, participation in activities, spinal precautions, ADLs  07/05/18 1507     Done Acceptance E,TB,D VU,DU rwx safety, benefits of activity, ADLs, spinal precautions, bed mobility, back brace  07/04/18 1456     Done Acceptance E,TB VU   07/04/18 0420     Done Acceptance E,TB VU   07/04/18 0411     Done Acceptance E,TB,D VU,DU spinal precautions, log roll, AE, LBD, benefits of activity  07/03/18 1516     Done Acceptance E,TB VU   07/03/18 0321     Done Acceptance E,TB,D VU,DU spinal precautions, donning brace and reinforcement for wear schedule, ADLs, balance, rwx safety, transfers  07/02/18 1434     Active Acceptance E NR   07/01/18 1503     Active Acceptance D,E NR log roll, LSO needs, ADLs, transfers, bed mobility, feeding  06/28/18 1524     Active Acceptance E,D,TB NR LB dressing ADL retraining with AE of sock aid and reacher, requires reinforcement did demonstrate learning  06/25/18 1144     Done Acceptance E,TB,H,D VU,DU role of OT, benefits of  activity, spinal precautions, LBD/AE, transfers, rwx use and safety, balance, brace and wear schedule  06/23/18 1621          Point: Body mechanics (Done)     Description: Instruct learner(s) on proper positioning and spine alignment during self-care, functional mobility activities and/or exercises.   Learning Progress Summary     Learner Status Readiness Method Response Comment Documented by    Patient Done Acceptance E,TB,D VU,DU benefits of activities, participation in activities, spinal precautions, ADLs  07/05/18 1507     Done Acceptance E,TB,D VU,DU rwx safety, benefits of activity, ADLs, spinal precautions, bed mobility, back brace  07/04/18 1456     Done Acceptance E,TB VU   07/04/18 0420     Done Acceptance E,TB VU   07/04/18 0411     Done Acceptance E,TB,D VU,DU spinal precautions, log roll, AE, LBD, benefits of activity  07/03/18 1516     Done Acceptance E,TB VU   07/03/18 0321     Done Acceptance E,TB,D VU,DU spinal precautions, donning brace and reinforcement for wear schedule, ADLs, balance, rwx safety, transfers  07/02/18 1434     Active Acceptance E NR   07/01/18 1503     Active Acceptance D,E NR log roll, LSO needs, ADLs, transfers, bed mobility, feeding  06/28/18 1524     Active Acceptance E,D,TB NR LB dressing ADL retraining with AE of sock aid and reacher, requires reinforcement did demonstrate learning  06/25/18 1144     Done Acceptance E,TB,H,D VU,DU role of OT, benefits of activity, spinal precautions, LBD/AE, transfers, rwx use and safety, balance, brace and wear schedule  06/23/18 1621                      User Key     Initials Effective Dates Name Provider Type Discipline     06/10/18 -  Brea Miles OTR Occupational Therapist OT     05/31/18 -  DEVAUGHN Alvarez Registered Nurse Nurse     03/14/16 -  Coco Monahan, OT Occupational Therapist OT     04/03/18 -  Ashley Atwood, OT Occupational Therapist OT                OT Recommendation and  Plan  Outcome Summary/Treatment Plan (OT)  Anticipated Equipment Needs at Discharge (OT): tub bench, raised toilet seat  Anticipated Discharge Disposition (OT): inpatient rehabilitation facility, skilled nursing facility  Planned Therapy Interventions (OT Eval): adaptive equipment training, functional balance retraining, occupation/activity based interventions, patient/caregiver education/training, strengthening exercise, transfer/mobility retraining  Therapy Frequency (OT Eval): daily  Plan of Care Review  Plan of Care Reviewed With: patient  Plan of Care Reviewed With: patient  Outcome Summary: Plan to d/c to rehab this date. Pt agreeable to ADLs in bed only this morning d/t fatigue. Able to recall all spinal precautions and verbalizes understanding of wearing LSO when OOB.           Outcome Measures     Row Name 07/05/18 0900 07/05/18 0738 07/04/18 0909       How much help from another person do you currently need...    Turning from your back to your side while in flat bed without using bedrails? 3  -VG  -- 3  -VG    Moving from lying on back to sitting on the side of a flat bed without bedrails? 3  -VG  -- 3  -VG    Moving to and from a bed to a chair (including a wheelchair)? 4  -VG  -- 3  -VG    Standing up from a chair using your arms (e.g., wheelchair, bedside chair)? 4  -VG  -- 3  -VG    Climbing 3-5 steps with a railing? 2  -VG  -- 2  -VG    To walk in hospital room? 3  -VG  -- 3  -VG    AM-PAC 6 Clicks Score 19  -VG  -- 17  -VG       How much help from another is currently needed...    Putting on and taking off regular lower body clothing?  -- 3  -ST  --    Bathing (including washing, rinsing, and drying)  -- 3  -ST  --    Toileting (which includes using toilet bed pan or urinal)  -- 2  -ST  --    Putting on and taking off regular upper body clothing  -- 3  -ST  --    Taking care of personal grooming (such as brushing teeth)  -- 4  -ST  --    Eating meals  -- 4  -ST  --    Score  -- 19  -ST  --        Functional Assessment    Outcome Measure Options [facility] 6 Clicks Basic Mobility (PT)  -VG  -- [facility] 6 Clicks Basic Mobility (PT)  -VG    Row Name 07/04/18 0713 07/03/18 1433 07/03/18 1025       How much help from another person do you currently need...    Turning from your back to your side while in flat bed without using bedrails?  --  -- 3  -VG    Moving from lying on back to sitting on the side of a flat bed without bedrails?  --  -- 3  -VG    Moving to and from a bed to a chair (including a wheelchair)?  --  -- 3  -VG    Standing up from a chair using your arms (e.g., wheelchair, bedside chair)?  --  -- 3  -VG    Climbing 3-5 steps with a railing?  --  -- 2  -VG    To walk in hospital room?  --  -- 3  -VG    [facility] 6 Clicks Score  --  -- 17  -VG       How much help from another is currently needed...    Putting on and taking off regular lower body clothing? 3  -ST 3  -ST  --    Bathing (including washing, rinsing, and drying) 3  -ST 3  -ST  --    Toileting (which includes using toilet bed pan or urinal) 2  -ST 2  -ST  --    Putting on and taking off regular upper body clothing 3  -ST 3  -ST  --    Taking care of personal grooming (such as brushing teeth) 4  -ST 4  -ST  --    Eating meals 4  -ST 4  -ST  --    Score 19  -ST 19  -ST  --       Functional Assessment    Outcome Measure Options  --  -- -[facility] 6 Clicks Basic Mobility (PT)  -VG      User Key  (r) = Recorded By, (t) = Taken By, (c) = Cosigned By    Initials Name Provider Type     Brea Miles, OTR Occupational Therapist    VG Margi Lauren, PT Physical Therapist           Time Calculation:          Time Calculation- OT     Row Name 07/05/18 0900 07/05/18 0738          Time Calculation- OT    OT Start Time  -- 0738  -ST     OT Received On  -- 07/05/18  -ST        Timed Charges    39936 - Gait Training Minutes  30  -VG  --     74846 - OT Self Care/Mgmt Minutes  -- 12  -ST       User Key  (r) = Recorded By, (t) = Taken By, (c) = Cosigned By     Initials Name Provider Type     Brea Miles OTR Occupational Therapist    JESUS Lauren, PT Physical Therapist        Therapy Suggested Charges     Code   Minutes Charges    82012 (CPT®) Hc Ot Neuromusc Re Education Ea 15 Min      29347 (CPT®) Hc Ot Ther Proc Ea 15 Min      54122 (CPT®) Hc Gait Training Ea 15 Min      69814 (CPT®) Hc Ot Therapeutic Act Ea 15 Min      53377 (CPT®) Hc Ot Manual Therapy Ea 15 Min      68229 (CPT®) Hc Ot Iontophoresis Ea 15 Min      58230 (CPT®) Hc Ot Elec Stim Ea-Per 15 Min      58728 (CPT®) Hc Ot Ultrasound Ea 15 Min      58873 (CPT®) Hc Ot Self Care/Mgmt/Train Ea 15 Min 12 1    Total  12 1        Therapy Charges for Today     Code Description Service Date Service Provider Modifiers Qty    [id number] HC OT SELF CARE/MGMT/TRAIN EA 15 MIN 7/4/2018 Brea Miles OTR GO 2    [id number] HC OT SELF CARE/MGMT/TRAIN EA 15 MIN 7/5/2018 Brea Miles OTR GO 1          OT G-codes  OT Professional Judgement Used?: Yes  OT Functional Scales Options: AM-PAC 6 Clicks Daily Activity (OT)  Score: 12  Functional Limitation: Self care  Self Care Current Status (): At least 60 percent but less than 80 percent impaired, limited or restricted  Self Care Goal Status (): At least 40 percent but less than 60 percent impaired, limited or restricted    OT Discharge Summary  Anticipated Discharge Disposition (OT): inpatient rehabilitation facility, skilled nursing facility  Reason for Discharge: Discharge from facility  Outcomes Achieved: Refer to plan of care for updates on goals achieved  Discharge Destination: SNF    CHARLOTTE Day  7/5/2018

## 2018-07-05 NOTE — PLAN OF CARE
Problem: Patient Care Overview  Goal: Plan of Care Review  Outcome: Outcome(s) achieved Date Met: 07/05/18 07/05/18 0211   Coping/Psychosocial   Plan of Care Reviewed With patient   OTHER   Outcome Summary Plan to d/c to rehab this date. Pt agreeable to ADLs in bed only this morning d/t fatigue. Able to recall all spinal precautions and verbalizes understanding of wearing LSO when OOB.

## 2018-07-05 NOTE — DISCHARGE SUMMARY
DISCHARGE SUMMARY    Date of Admission: 6/22/2018    Date of Discharge:  7/5/2018    Discharge Diagnosis: Spondylosis with myelopathy, lumbar region and neurogenic claudication     Procedures Performed:  Procedure(s):  LUMBAR LAMINECTOMY L2-5      Presenting Problem/History of Present Illness  Spondylosis with myelopathy, lumbar region [M47.16]  Spinal stenosis, lumbar region, with neurogenic claudication [M48.062]  Degenerative disc disease, lumbar [M51.36]  Osteoarthritis of spine with radiculopathy, lumbar region [M47.26]  Spondylosis [M47.9]  Spondylosis [M47.9]  Impaired functional mobility, balance, gait, and endurance [Z74.09]     Hospital Course  Patient is a 72 y.o. male who presented with with chronic neurogenic claudication.  He was found to have severe spinal stenosis at multiple levels in the lumbar spine.  He underwent a multilevel decompression on 6/22/2018 which she tolerated well.  His postoperative course has included acute blood loss anemia with hypotension with 2 unit transfusion with good results.  He developed delayed postoperative confusion and disorientation which resolved with blood pressure control, IV fluids and discontinuation and adjustments of pain medications.  Prior to discharge the patient was awake, alert and oriented, back to his baseline.  He is participating with physical therapy however his level of activity is slow to progress due to chronic deconditioning and multiple medical comorbidities.  The patient will require blood glucose checks and ongoing monitoring and treatment of his diabetes.  He needs daily physical therapy and occupational therapy with the goals of returning to independent life.  He will continue to wear his lumbar support brace when he is up walking and participating with therapy, this can be discontinued for sleep.      Discharge Medications     Discharge Medications      New Medications      Instructions Start Date   oxyCODONE-acetaminophen 5-325 MG per  tablet  Commonly known as:  PERCOCET   1 tablet, Oral, Every 4 Hours PRN         Changes to Medications      Instructions Start Date   gabapentin 400 MG capsule  Commonly known as:  NEURONTIN  What changed:  · medication strength  · how much to take  · when to take this  · additional instructions   400 mg, Oral, Nightly      gabapentin 300 MG capsule  Commonly known as:  NEURONTIN  What changed:  Another medication with the same name was changed. Make sure you understand how and when to take each.   300 mg, Oral, 3 Times Daily         Continue These Medications      Instructions Start Date   albuterol 108 (90 Base) MCG/ACT inhaler  Commonly known as:  PROVENTIL HFA;VENTOLIN HFA   2 puffs, Inhalation, Every 4 Hours PRN      carvedilol 3.125 MG tablet  Commonly known as:  COREG   3.125 mg, Oral, 2 Times Daily With Meals      finasteride 5 MG tablet  Commonly known as:  PROSCAR   5 mg, Oral, Daily      FLINTSTONES COMPLETE PO   1 tablet, Oral, Daily      fluticasone 27.5 MCG/SPRAY nasal spray  Commonly known as:  VERAMYST   2 sprays, Nasal, Daily      folic acid 400 MCG tablet  Commonly known as:  FOLVITE   800 mcg, Oral, Daily      furosemide 20 MG tablet  Commonly known as:  LASIX   20 mg, Oral, Daily      GLUCAGON EMERGENCY 1 MG injection  Generic drug:  glucagon   1 mg, Subcutaneous, Once As Needed      glyBURIDE-metFORMIN 5-500 MG per tablet  Commonly known as:  GLUCOVANCE   2 tablets, Oral, 2 Times Daily With Meals      guaiFENesin 200 MG tablet   400 mg, Oral, Every 4 Hours PRN      insulin NPH-insulin regular (70-30) 100 UNIT/ML injection  Commonly known as:  humuLIN 70/30,novoLIN 70/30   70 Units, Subcutaneous, 2 Times Daily With Meals, Unless blood sugars are good then adjusts       ipratropium 0.06 % nasal spray  Commonly known as:  ATROVENT   2 sprays, Nasal, 3 Times Daily      lisinopril 20 MG tablet  Commonly known as:  PRINIVIL,ZESTRIL   20 mg, Oral, Daily      melatonin 5 MG tablet tablet   5 mg, Oral,  Nightly      nabumetone 750 MG tablet  Commonly known as:  RELAFEN   750 mg, 2 Times Daily PRN      nitroglycerin 0.4 MG SL tablet  Commonly known as:  NITROSTAT   0.4 mg, Sublingual, Every 5 Minutes PRN, Take no more than 3 doses in 15 minutes. Has not use over 10 years      O2  Commonly known as:  OXYGEN   3 L/min, Inhalation, Nightly      oxybutynin 5 MG tablet  Commonly known as:  DITROPAN   5 mg, Oral, Nightly      pantoprazole 40 MG EC tablet  Commonly known as:  PROTONIX   40 mg, Oral, 2 Times Daily      potassium gluconate 595 (99 K) MG tablet tablet   595 mg, Oral, Daily      raNITIdine 150 MG tablet  Commonly known as:  ZANTAC   150 mg, Oral, 2 Times Daily      sertraline 100 MG tablet  Commonly known as:  ZOLOFT   100 mg, Oral, Every Morning      sertraline 100 MG tablet  Commonly known as:  ZOLOFT   150 mg, Oral, Nightly      simvastatin 80 MG tablet  Commonly known as:  ZOCOR   80 mg, Nightly      tamsulosin 0.4 MG capsule 24 hr capsule  Commonly known as:  FLOMAX   1 capsule, Oral, 2 Times Daily      vitamin A 92490 UNIT capsule   8,000 Units, Oral, Daily      Vitamin D2 400 units tablet   1 tablet, Oral, Daily      zolpidem 10 MG tablet  Commonly known as:  AMBIEN   10 mg, Nightly PRN           Discharge Diet:  1800-calorie diabetic, carbohydrate limiting diet     Activity at Discharge:       Activity Instructions      Discharge Activity Restrictions        1) No driving for 3 weeks or until discussed with the clinician in the office  2) May shower with wound covered  3) Do not lift / push / pull more then 10 lbs.for 3 weeks  4) Call the office with fever or chills, wound swelling or drainage, increased unexpected pain             Condition on Discharge:  stable and satisfactory      Discharge Disposition   The patient will require medical care with daily blood glucose checks and treatments.  The patient's surgical incision needs antibacterial cleaning on a daily basis.  Wound monitoring for swelling  or drainage that would require neurosurgical evaluation.     Follow-up Appointments  No future appointments.       Additional Instructions for the Follow-ups that You Need to Schedule      Discharge Follow-up with Specialty: frieda; 1 Month    As directed        Specialty:  frieda    Follow Up:  1 Month              Coco Solis PA-C  07/05/18  10:18 AM

## 2018-07-05 NOTE — PROGRESS NOTES
Case Management Discharge Note    Final Note: Mr. Main has a skilled bed at [facility] today, if medically ready.  CM will notify Mr. Main's SO, Ban, by phone.  [facility] ambulance scheduled for today at 2pm.  PCS form in the chart.  Please call report to [facility] at  [phone].  CM will fax the transfer summary to fax [phone].  Please have a copy of the transfer summary and any scripts in the discharge packet.  Thank you.    Destination - Selection Complete     Service Request Status Selected Specialties Address Phone Number Fax Number    [facility] Selected Skilled Nursing [facility] [address] [address] [phone]      Durable Medical Equipment     No service has been selected for the patient.      Dialysis/Infusion     No service has been selected for the patient.      Home Medical Care     No service has been selected for the patient.      Social Care     No service has been selected for the patient.        Ambulance: [facility]    Final Discharge Disposition Code: 03 - skilled nursing facility (SNF)

## 2018-07-05 NOTE — THERAPY DISCHARGE NOTE
Acute Care - Physical Therapy Treatment Note/Discharge   [place]     Patient Name: Sai Main  : 1945  MRN: [id number]  Today's Date: 2018  Onset of Illness/Injury or Date of Surgery: 18  Date of Referral to PT: 18  Referring Physician: MD Pepe    Admit Date: 2018    Visit Dx:    ICD-10-CM ICD-9-CM   1. Impaired functional mobility, balance, gait, and endurance Z74.09 V49.89   2. Impaired mobility and ADLs Z74.09 799.89     Patient Active Problem List   Diagnosis   • Episode of change in speech   • Spinal stenosis of lumbar region with neurogenic claudication   • Radicular pain of both lower extremities   • Chronic bilateral low back pain with bilateral sciatica   • DDD (degenerative disc disease), lumbar   • Spondylosis of lumbar region without myelopathy or radiculopathy   • Congestive heart failure (CMS/HCC)   • IDDM (insulin dependent diabetes mellitus) (CMS/HCC)   • Insomnia   • Physical deconditioning   • Anxiety and depression   • At high risk for falls   • Gait disturbance   • Diabetic polyneuropathy associated with type 1 diabetes mellitus (CMS/HCC)   • Left foot drop   • Moderate obesity   • Spondylosis with myelopathy, lumbar region   • Spinal stenosis, lumbar region, with neurogenic claudication   • Degenerative disc disease, lumbar   • Osteoarthritis of spine with radiculopathy, lumbar region   • Spondylosis   • Impaired functional mobility, balance, gait, and endurance   • Metabolic encephalopathy   • AYE (acute kidney injury) (CMS/HCC)   • Anemia   • Hypotension   • Hypoglycemia       Physical Therapy Education     Title: PT OT SLP Therapies (Done)     Topic: Physical Therapy (Done)     Point: Mobility training (Done)    Learning Progress Summary     Learner Status Readiness Method Response Comment Documented by    Patient Done Acceptance E NR,VU Educated on HEP and correct mechanics for safe functional mobility. Recalls 3/3 spinal precautions. Requires cues  for maintaining spinal precautions during mobility. Reinforcement needed.  07/05/18 0954     Active Acceptance E NR Educated on correct mechanics for safe functional mobility. Able to recall 2/3 spinal precautions. Reinforcement needed.  07/04/18 0937     Active Acceptance E NR Educated on HEP, correct mechanics for safe functional mobility, and spinal precautions; reinforcement needed.  07/03/18 1114     Active Acceptance E,D NR Educated on spinal precautions, correct log rolling technique, correct gait mechanics, and HEP. LR 07/02/18 1016     Active Acceptance E NR   07/01/18 1623     Done Acceptance E LORRAINE,NR   06/30/18 1112     Active Acceptance E NR Educated on proper mechanics for safe functional mobility. Needs reinforcement d/t cognition.  06/28/18 1406     Active Acceptance E,D NR Reviewed back precautions, safety with mobility/transfers, HEP.  06/27/18 1557     Active Acceptance E NR,NL BACK PRECAUTIONS, IMPORTANCE OF HAVING BRACE UP AT ALL TIMES WHEN OOB. CD 06/26/18 1109     Done Acceptance E,D VU,NR Reviewed benefits of activity, spinal precautions, log roll technique, correct gait mechanics, HEP.  06/25/18 0906     Done Acceptance E,D VU,NR Reviewed spinal precautions, correct gait mechanics, HEP, benefits of activity.  06/24/18 1352     Active Acceptance E,D,H NR Reviewed spinal precautions, log roll technique, performing ankle pumps throughout the day, correct gait mechanics, safety with mobility and transfers, wear schedule for lumbar brace.  06/23/18 1457    Significant Other Done Acceptance E,D VU,NR Reviewed spinal precautions, correct gait mechanics, HEP, benefits of activity.  06/24/18 1352     Active Acceptance E,D,H NR Reviewed spinal precautions, log roll technique, performing ankle pumps throughout the day, correct gait mechanics, safety with mobility and transfers, wear schedule for lumbar brace.  06/23/18 1457          Point: Home exercise program (Done)    Learning  Progress Summary     Learner Status Readiness Method Response Comment Documented by    Patient Done Acceptance E NR,VU Educated on HEP and correct mechanics for safe functional mobility. Recalls 3/3 spinal precautions. Requires cues for maintaining spinal precautions during mobility. Reinforcement needed.  07/05/18 0954     Active Acceptance E NR Educated on correct mechanics for safe functional mobility. Able to recall 2/3 spinal precautions. Reinforcement needed.  07/04/18 0937     Active Acceptance E NR Educated on HEP, correct mechanics for safe functional mobility, and spinal precautions; reinforcement needed.  07/03/18 1114     Active Acceptance E,D NR Educated on spinal precautions, correct log rolling technique, correct gait mechanics, and HEP. LR 07/02/18 1016     Active Acceptance E NR  KR 07/01/18 1623     Done Acceptance E TITUS PETERS   06/30/18 1112     Active Acceptance E NR Educated on proper mechanics for safe functional mobility. Needs reinforcement d/t cognition.  06/28/18 1406     Active Acceptance E,D NR Reviewed back precautions, safety with mobility/transfers, HEP.  06/27/18 1557     Active Acceptance E NR,NL BACK PRECAUTIONS, IMPORTANCE OF HAVING BRACE UP AT ALL TIMES WHEN OOB. CD 06/26/18 1109     Done Acceptance E,D LORRAINE,NR Reviewed benefits of activity, spinal precautions, log roll technique, correct gait mechanics, HEP.  06/25/18 0906     Done Acceptance E,D LORRAINE,NR Reviewed spinal precautions, correct gait mechanics, HEP, benefits of activity.  06/24/18 1352     Active Acceptance E,D,H NR Reviewed spinal precautions, log roll technique, performing ankle pumps throughout the day, correct gait mechanics, safety with mobility and transfers, wear schedule for lumbar brace.  06/23/18 1457    Significant Other Done Acceptance E,D LORRAINE,NR Reviewed spinal precautions, correct gait mechanics, HEP, benefits of activity.  06/24/18 1352     Active Acceptance E,D,H NR Reviewed spinal  precautions, log roll technique, performing ankle pumps throughout the day, correct gait mechanics, safety with mobility and transfers, wear schedule for lumbar brace.  06/23/18 1457          Point: Body mechanics (Done)    Learning Progress Summary     Learner Status Readiness Method Response Comment Documented by    Patient Done Acceptance E NR,VU Educated on HEP and correct mechanics for safe functional mobility. Recalls 3/3 spinal precautions. Requires cues for maintaining spinal precautions during mobility. Reinforcement needed.  07/05/18 0954     Active Acceptance E NR Educated on correct mechanics for safe functional mobility. Able to recall 2/3 spinal precautions. Reinforcement needed. VG 07/04/18 0937     Active Acceptance E NR Educated on HEP, correct mechanics for safe functional mobility, and spinal precautions; reinforcement needed.  07/03/18 1114     Active Acceptance E,D NR Educated on spinal precautions, correct log rolling technique, correct gait mechanics, and HEP. LR 07/02/18 1016     Active Acceptance E NR  KR 07/01/18 1623     Done Acceptance E TITUS PETERS   06/30/18 1112     Active Acceptance E NR Educated on proper mechanics for safe functional mobility. Needs reinforcement d/t cognition.  06/28/18 1406     Active Acceptance E,D NR Reviewed back precautions, safety with mobility/transfers, HEP.  06/27/18 1557     Active Acceptance E NR,NL BACK PRECAUTIONS, IMPORTANCE OF HAVING BRACE UP AT ALL TIMES WHEN OOB. CD 06/26/18 1109     Done Acceptance E,D VU,NR Reviewed benefits of activity, spinal precautions, log roll technique, correct gait mechanics, HEP.  06/25/18 0906     Done Acceptance E,D LORRAINE,NR Reviewed spinal precautions, correct gait mechanics, HEP, benefits of activity.  06/24/18 1352     Active Acceptance E,D,H NR Reviewed spinal precautions, log roll technique, performing ankle pumps throughout the day, correct gait mechanics, safety with mobility and transfers, wear schedule  for lumbar brace.  06/23/18 1457    Significant Other Done Acceptance ED LORRAINE,NR Reviewed spinal precautions, correct gait mechanics, HEP, benefits of activity.  06/24/18 1352     Active Acceptance E,D,H NR Reviewed spinal precautions, log roll technique, performing ankle pumps throughout the day, correct gait mechanics, safety with mobility and transfers, wear schedule for lumbar brace.  06/23/18 1457          Point: Precautions (Done)    Learning Progress Summary     Learner Status Readiness Method Response Comment Documented by    Patient Done Acceptance E NR,VU Educated on HEP and correct mechanics for safe functional mobility. Recalls 3/3 spinal precautions. Requires cues for maintaining spinal precautions during mobility. Reinforcement needed.  07/05/18 0954     Active Acceptance E NR Educated on correct mechanics for safe functional mobility. Able to recall 2/3 spinal precautions. Reinforcement needed.  07/04/18 0937     Active Acceptance E NR Educated on HEP, correct mechanics for safe functional mobility, and spinal precautions; reinforcement needed.  07/03/18 1114     Active Acceptance ED NR Educated on spinal precautions, correct log rolling technique, correct gait mechanics, and HEP. LR 07/02/18 1016     Active Acceptance E NR   07/01/18 1623     Done Acceptance E TITUS PETERS   06/30/18 1112     Active Acceptance E NR Educated on proper mechanics for safe functional mobility. Needs reinforcement d/t cognition.  06/28/18 1406     Active Acceptance ED NR Reviewed back precautions, safety with mobility/transfers, HEP.  06/27/18 1557     Active Acceptance E NR,NL BACK PRECAUTIONS, IMPORTANCE OF HAVING BRACE UP AT ALL TIMES WHEN OOB. CD 06/26/18 1109     Done Acceptance ED LORRAINENR Reviewed benefits of activity, spinal precautions, log roll technique, correct gait mechanics, HEP.  06/25/18 0906     Done Acceptance ED TITUS PETERS Reviewed spinal precautions, correct gait mechanics, HEP, benefits of  activity.  06/24/18 1352     Active Acceptance E,D,H NR Reviewed spinal precautions, log roll technique, performing ankle pumps throughout the day, correct gait mechanics, safety with mobility and transfers, wear schedule for lumbar brace.  06/23/18 1457    Significant Other Done Acceptance E,D VU,NR Reviewed spinal precautions, correct gait mechanics, HEP, benefits of activity.  06/24/18 1352     Active Acceptance E,D,H NR Reviewed spinal precautions, log roll technique, performing ankle pumps throughout the day, correct gait mechanics, safety with mobility and transfers, wear schedule for lumbar brace.  06/23/18 1457                      User Key     Initials Effective Dates Name Provider Type Discipline    CD 06/19/15 -  Stephanie Boyd, PT Physical Therapist PT    JM 06/19/15 -  Chad Lizama, PT Physical Therapist PT    LR 06/19/15 -  Adrianne Fernandez, PT Physical Therapist PT    KR 04/03/18 -  Narcisa Cheek, PT Physical Therapist PT    LM 04/03/18 -  Kathleen Barrios, PT Physical Therapist PT    VG 05/29/18 -  Margi Lauren, PT Physical Therapist PT                    PT Rehab Goals     Row Name 07/05/18 0900             Bed Mobility Goal 1 (PT)    Activity/Assistive Device (Bed Mobility Goal 1, PT) sit to supine/supine to sit  -VG      [place] Level/Cues Needed (Bed Mobility Goal 1, PT) supervision required  -VG      Time Frame (Bed Mobility Goal 1, PT) long term goal (LTG);5 days  -VG      Progress/Outcomes (Bed Mobility Goal 1, PT) goal partially met;discharged from facility  -VG         Transfer Goal 1 (PT)    Activity/Assistive Device (Transfer Goal 1, PT) sit-to-stand/stand-to-sit;walker, rolling  -VG      [place] Level/Cues Needed (Transfer Goal 1, PT) supervision required  -VG      Time Frame (Transfer Goal 1, PT) long term goal (LTG);5 days  -VG      Progress/Outcome (Transfer Goal 1, PT) goal met;discharged from facility  -VG         Gait Training Goal 1 (PT)     Activity/Assistive Device (Gait Training Goal 1, PT) gait (walking locomotion);assistive device use;walker, rolling  -VG      [place] Level (Gait Training Goal 1, PT) supervision required  -VG      Distance (Gait Goal 1, PT) 150 feet  -VG      Time Frame (Gait Training Goal 1, PT) long term goal (LTG);5 days  -VG      Progress/Outcome (Gait Training Goal 1, PT) goal partially met;discharged from facility  -VG        User Key  (r) = Recorded By, (t) = Taken By, (c) = Cosigned By    Initials Name Provider Type Discipline    VG Margi Lauren, PT Physical Therapist PT        Therapy Treatment        Rehabilitation Treatment Summary     Row Name 07/05/18 0900             Treatment Time/Intention    Discipline physical therapist  -VG      Document Type discharge treatment  -VG      Subjective Information complains of;pain  -VG      Mode of Treatment individual therapy;physical therapy  -VG      Patient/Family Observations In bed, bed check; no family present.  -VG      Care Plan Review patient/other agree to care plan  -VG      Therapy Frequency (PT Clinical Impression) daily  -VG      Patient Effort good  -VG      Existing Precautions/Restrictions fall;spinal;brace worn when out of bed  -VG      Recorded by [VG] Margi Lauren, PT 07/05/18 0954      Row Name 07/05/18 0900             Vital Signs    Post Systolic BP Rehab 150  -VG      Post Treatment Diastolic BP 73  -VG      Post Patient Position Sitting  -VG      Recorded by [VG] Margi Lauren, PT 07/05/18 0954      Row Name 07/05/18 0900             Cognitive Assessment/Intervention- PT/OT    Affect/Mental Status (Cognitive) WNL  -VG      Orientation Status (Cognition) oriented x 4  -VG      Follows Commands (Cognition) follows one step commands;over 90% accuracy  -VG      Cognitive Function (Cognitive) attention deficit;memory deficit;safety deficit  -VG      Attention Deficit (Cognitive) mild deficit;requires cues/redirection to task  -VG      Executive  Function Deficit (Cognition) mild deficit;organization/sequencing  -VG      Memory Deficit (Cognitive) mild deficit;working memory  -VG      Safety Deficit (Cognitive) mild deficit;safety precautions follow-through/compliance;safety precautions awareness  -VG      Personal Safety Interventions fall prevention program maintained;gait belt;nonskid shoes/slippers when out of bed;supervised activity  -VG      Recorded by [VG] Margi Lauren, PT 07/05/18 0954      Row Name 07/05/18 0900             Safety Issues, Functional Mobility    Safety Issues Affecting Function (Mobility) safety precautions follow-through/compliance;safety precaution awareness;positioning of assistive device  -VG      Impairments Affecting Function (Mobility) pain  -VG      Comment, Safety Issues/Impairments (Mobility) Pt able to recall 3/3 spinal precautions.  -VG      Recorded by [VG] Margi Lauren, PT 07/05/18 0954      Row Name 07/05/18 0900             Bed Mobility Assessment/Treatment    Bed Mobility Assessment/Treatment rolling left;rolling right;supine-sit  -VG      Rolling Left [place] (Bed Mobility) moderate assist (50% patient effort);verbal cues  -VG      Rolling Right [place] (Bed Mobility) moderate assist (50% patient effort);verbal cues  -VG      Supine-Sit [place] (Bed Mobility) minimum assist (75% patient effort);verbal cues  -VG      Bed Mobility, Safety Issues decreased use of arms for pushing/pulling;decreased use of legs for bridging/pushing  -VG      Assistive Device (Bed Mobility) bed rails  -VG      Comment (Bed Mobility) VC's for sequencing, no dizziness noted upon sitting EOB. VC's for logroll.  -VG      Recorded by [VG] Margi Lauren, PT 07/05/18 0954      Row Name 07/05/18 0900             Transfer Assessment/Treatment    Transfer Assessment/Treatment sit-stand transfer;stand-sit transfer;toilet transfer  -VG      Comment (Transfers) VC's for hand placement, turning and backing up to transfer  surface, and maintaining spinal precautions. Completed toileting with SBA for perineal hygeine.  -VG      Recorded by [VG] Margi Lauren, PT 07/05/18 0954      Row Name 07/05/18 0900             Sit-Stand Transfer    Sit-Stand [place] (Transfers) supervision;verbal cues  -VG      Assistive Device (Sit-Stand Transfers) walker, front-wheeled  -VG      Recorded by [VG] Margi Lauren, PT 07/05/18 0954      Row Name 07/05/18 0900             Stand-Sit Transfer    Stand-Sit [place] (Transfers) supervision;verbal cues  -VG      Assistive Device (Stand-Sit Transfers) walker, front-wheeled  -VG      Recorded by [VG] Margi Lauren, PT 07/05/18 0954      Row Name 07/05/18 0900             Toilet Transfer    Type (Toilet Transfer) sit-stand;stand-sit  -VG      [place] Level (Toilet Transfer) supervision;verbal cues  -VG      Assistive Device (Toilet Transfer) walker, front-wheeled;grab bars/safety frame;raised toilet seat  -VG      Recorded by [VG] Margi Lauren, PT 07/05/18 0954      Row Name 07/05/18 0900             Gait/Stairs Assessment/Training    [id number] - Gait Training Minutes  30  -VG      Gait/Stairs Assessment/Training gait/ambulation independence;gait/ambulation assistive device;distance ambulated;gait pattern;gait deviations;maintains weight-bearing status  -VG      [place] Level (Gait) contact guard;verbal cues  -VG      Assistive Device (Gait) walker, front-wheeled  -VG      Distance in Feet (Gait) 40  -VG      Pattern (Gait) step-through;step-to  -VG      Deviations/Abnormal Patterns (Gait) bilateral deviations;jackie decreased;gait speed decreased;stride length decreased  -VG      Bilateral Gait Deviations forward flexed posture;heel strike decreased  -VG      Comment (Gait/Stairs) VCs for AD placement, small steps during turns. Distance limited by pain in B feet. VC's for upright posture.  -VG      Recorded by [VG] Margi Lauren, PT 07/05/18 0954      Row Name 07/05/18 0900              Therapeutic Exercise    [id number] - PT Therapeutic Exercise Minutes 15  -VG      Recorded by [VG] Margi Lauren, PT 07/05/18 0954      Row Name 07/05/18 0900             Therapeutic Exercise    Lower Extremity (Therapeutic Exercise) gluteal sets;heel slides, bilateral;quad sets, bilateral  -VG      Lower Extremity Range of Motion (Therapeutic Exercise) hip internal/external rotation, bilateral;ankle dorsiflexion/plantar flexion, bilateral  -VG      Core Strength (Therapeutic Exercise) abdominal bracing  -VG      Exercise Type (Therapeutic Exercise) AROM (active range of motion)  -VG      Position (Therapeutic Exercise) supine  -VG      Sets/Reps (Therapeutic Exercise) 10x  -VG      Comment (Therapeutic Exercise) Per spinal protocol, vc's for technique.  -VG      Recorded by [VG] Margi Lauren, PT 07/05/18 0954      Row Name 07/05/18 0900             Dynamic Standing Balance    Level of [place], Reaches Outside Midline (Standing, Dynamic Balance) minimal assist, 75% patient effort  -VG      Time Able to Maintain Position, Reaches Outside Midline (Standing, Dynamic Balance) 1 to 2 minutes  -VG      Lower Extremity Device, Reaches Outside Midline (Standing, Dynamic Balance) orthosis  -VG      Comment, Reaches Outside Midline (Standing, Dynamic Balance) Required KATHY to maintain balance while performing hand hygeine.  -VG      Recorded by [VG] Margi Lauren, PT 07/05/18 0954      Row Name 07/05/18 0900             Positioning and Restraints    Pre-Treatment Position in bed  -VG      Post Treatment Position chair  -VG      In Chair reclined;call light within reach;encouraged to call for assist;exit alarm on;notified nsg;waffle cushion;legs elevated  -VG      Recorded by [VG] Margi Lauren, PT 07/05/18 0954      Row Name 07/05/18 0900             Pain Scale: Numbers Pre/Post-Treatment    Pain Scale: Numbers, Pretreatment 7/10  -VG      Pain Scale: Numbers, Post-Treatment 8/10  -VG      Pain Location - Side  Bilateral  -VG      Pain Location ankle  -VG      Pain Intervention(s) Repositioned;Ambulation/increased activity   nsg administered pain medication prior to mobility  -VG      Recorded by [VG] Margi Lauren, PT 07/05/18 0954      Row Name 07/05/18 0900             Spinal Orthosis Management    Type (Spinal Orthosis) LSO (lumbar sacral orthosis)  -VG      Orthosis Training (Spinal Orthosis) patient;donning/doffing orthosis  -VG      Recorded by [VG] Margi Lauren, PT 07/05/18 0954      Row Name                Wound 06/22/18 1347 Other (See comments) back incision    Wound - Properties Group Date first assessed: 06/22/18 [KS] Time first assessed: 1347 [KS] Side: Other (See comments) [KS] Location: back [KS] Type: incision [KS] Recorded by:  [KS] Hemalatha Gomez RN 06/22/18 1347    Row Name                Wound 06/22/18 1347 Other (See comments) back incision    Wound - Properties Group Date first assessed: 06/22/18 [KS] Time first assessed: 1347 [KS] Side: Other (See comments) [KS] Location: back [KS] Type: incision [KS] Recorded by:  [KS] Hemalatha Gomez RN 06/22/18 1347    Row Name 07/05/18 0900             Coping    Observed Emotional State accepting  -VG      Verbalized Emotional State acceptance  -VG      Recorded by [VG] Margi Lauren, PT 07/05/18 0954      Row Name 07/05/18 0900             Plan of Care Review    Plan of Care Reviewed With patient  -VG      Recorded by [VG] Margi Lauren, PT 07/05/18 0954      Row Name 07/05/18 0900             Outcome Summary/Treatment Plan (PT)    Daily Summary of Progress (PT) progress toward functional goals is good  -VG      Anticipated Equipment Needs at Discharge (PT) --   defer to rehab  -VG      Anticipated Discharge Disposition (PT) skilled nursing facility  -VG      Reason for Discharge (PT Discharge Summary) patient discharged from this facility  -VG      Recorded by [VG] Margi Lauren, PT 07/05/18 0954        User Key  (r) = Recorded By, (t) =  Taken By, (c) = Cosigned By    Initials Name Effective Dates Discipline    KS Hemalatha Gomez, RN 05/09/17 -  Nurse    VG Margi Lauren, PT 05/29/18 -  PT        Wound 06/22/18 1347 Other (See comments) back incision (Active)   Dressing Appearance dry;intact 7/5/2018  8:22 AM   Dressing Care, Wound other (see comments) 7/5/2018  8:22 AM       PT Recommendation and Plan  Anticipated Discharge Disposition (PT): skilled nursing facility  Therapy Frequency (PT Clinical Impression): daily  Outcome Summary/Treatment Plan (PT)  Daily Summary of Progress (PT): progress toward functional goals is good  Anticipated Equipment Needs at Discharge (PT):  (defer to rehab)  Anticipated Discharge Disposition (PT): skilled nursing facility  Reason for Discharge (PT Discharge Summary): patient discharged from this facility  Plan of Care Reviewed With: patient  Progress: improving  Outcome Summary: Ambulated 40 ft with CGA and RW, distance limited by pain in B ankles. Demonstrates improved mechanics and stability with bed mobility and transfers, requires cues for maintaining spinal precautions. Recalls 3/3 spinal precautions. D/C today to SNF. Partially met all mobility goals.          Outcome Measures     Row Name 07/05/18 0900 07/04/18 0909 07/04/18 0713       How much help from another person do you currently need...    Turning from your back to your side while in flat bed without using bedrails? 3  -VG 3  -VG  --    Moving from lying on back to sitting on the side of a flat bed without bedrails? 3  -VG 3  -VG  --    Moving to and from a bed to a chair (including a wheelchair)? 4  -VG 3  -VG  --    Standing up from a chair using your arms (e.g., wheelchair, bedside chair)? 4  -VG 3  -VG  --    Climbing 3-5 steps with a railing? 2  -VG 2  -VG  --    To walk in hospital room? 3  -VG 3  -VG  --    AM-PAC 6 Clicks Score 19  -VG 17  -VG  --       How much help from another is currently needed...    Putting on and taking off regular  lower body clothing?  --  -- 3  -ST    Bathing (including washing, rinsing, and drying)  --  -- 3  -ST    Toileting (which includes using toilet bed pan or urinal)  --  -- 2  -ST    Putting on and taking off regular upper body clothing  --  -- 3  -ST    Taking care of personal grooming (such as brushing teeth)  --  -- 4  -ST    Eating meals  --  -- 4  -ST    Score  --  -- 19  -ST       Functional Assessment    Outcome Measure Options AM-PAC 6 Clicks Basic Mobility (PT)  -VG [facility] 6 Clicks Basic Mobility (PT)  -VG  --    Row Name 07/03/18 1433 07/03/18 1025          How much help from another person do you currently need...    Turning from your back to your side while in flat bed without using bedrails?  -- 3  -VG     Moving from lying on back to sitting on the side of a flat bed without bedrails?  -- 3  -VG     Moving to and from a bed to a chair (including a wheelchair)?  -- 3  -VG     Standing up from a chair using your arms (e.g., wheelchair, bedside chair)?  -- 3  -VG     Climbing 3-5 steps with a railing?  -- 2  -VG     To walk in hospital room?  -- 3  -VG     AM-PAC 6 Clicks Score  -- 17  -VG        How much help from another is currently needed...    Putting on and taking off regular lower body clothing? 3  -ST  --     Bathing (including washing, rinsing, and drying) 3  -ST  --     Toileting (which includes using toilet bed pan or urinal) 2  -ST  --     Putting on and taking off regular upper body clothing 3  -ST  --     Taking care of personal grooming (such as brushing teeth) 4  -ST  --     Eating meals 4  -ST  --     Score 19  -ST  --        Functional Assessment    Outcome Measure Options  -- AM-PAC 6 Clicks Basic Mobility (PT)  -VG       User Key  (r) = Recorded By, (t) = Taken By, (c) = Cosigned By    Initials Name Provider Type     Brea Miles, OTR Occupational Therapist    VG Margi Lauren, PT Physical Therapist           Time Calculation:         PT Charges     Row Name 07/05/18 0900              Time Calculation    Start Time 0900  -VG      PT Received On 07/05/18  -VG      PT Goal Re-Cert Due Date 07/13/18  -VG         Time Calculation- PT    Total Timed Code Minutes- PT 45 minute(s)  -VG         Timed Charges    80926 - PT Therapeutic Exercise Minutes 15  -VG      75199 - Gait Training Minutes  30  -VG        User Key  (r) = Recorded By, (t) = Taken By, (c) = Cosigned By    Initials Name Provider Type     Margi Lauren, PT Physical Therapist        Therapy Suggested Charges     Code   Minutes Charges    96457 (CPT®) Hc Pt Neuromusc Re Education Ea 15 Min      07900 (CPT®) Hc Pt Ther Proc Ea 15 Min 15 1    56688 (CPT®) Hc Gait Training Ea 15 Min 30 2    23607 (CPT®) Hc Pt Therapeutic Act Ea 15 Min      34211 (CPT®) Hc Pt Manual Therapy Ea 15 Min      40814 (CPT®) Hc Pt Iontophoresis Ea 15 Min      18374 (CPT®) Hc Pt Elec Stim Ea-Per 15 Min      07656 (CPT®) Hc Pt Ultrasound Ea 15 Min      61256 (CPT®) Hc Pt Self Care/Mgmt/Train Ea 15 Min      Total  45 3          Therapy Charges for Today     Code Description Service Date Service Provider Modifiers Qty    38717380838 HC GAIT TRAINING EA 15 MIN 7/4/2018 Margi Lauren, PT GP 1    58327214991 HC PT MOBILITY CURRENT 7/5/2018 Margi Lauren, PT GP, CJ 1    87724921252 HC PT MOBILITY PROJECTED 7/5/2018 Margi Lauren, PT GP, CI 1    81376907614 HC PT MOBILITY DISCHARGE 7/5/2018 Margi Lauren, PT GP, CJ 1    63091656872 HC PT THER PROC EA 15 MIN 7/5/2018 Margi Lauren, PT GP 1    04951242491 HC GAIT TRAINING EA 15 MIN 7/5/2018 Margi Lauren, PT GP 2          PT G-Codes  PT Professional Judgement Used?: Yes  Outcome Measure Options: AM-PAC 6 Clicks Basic Mobility (PT)  Score: 19  Functional Limitation: Mobility: Walking and moving around  Mobility: Walking and Moving Around Current Status (): At least 20 percent but less than 40 percent impaired, limited or restricted  Mobility: Walking and Moving Around Goal Status (): At least 1  percent but less than 20 percent impaired, limited or restricted  Mobility: Walking and Moving Around Discharge Status (): At least 20 percent but less than 40 percent impaired, limited or restricted    PT Discharge Summary  Anticipated Discharge Disposition (PT): skilled nursing facility  Reason for Discharge: Discharge from facility  Outcomes Achieved: Patient able to partially acheive established goals  Discharge Destination: SNF    Michelle Lauren, PT  7/5/2018

## 2018-07-05 NOTE — PLAN OF CARE
Problem: Patient Care Overview  Goal: Plan of Care Review   07/05/18 0955   Coping/Psychosocial   Plan of Care Reviewed With patient   Plan of Care Review   Progress improving   OTHER   Outcome Summary Ambulated 40 ft with CGA and RW, distance limited by pain in B ankles. Demonstrates improved mechanics and stability with bed mobility and transfers, requires cues for maintaining spinal precautions. Recalls 3/3 spinal precautions. D/C today to SNF. Partially met all mobility goals.

## 2018-07-05 NOTE — PLAN OF CARE
Problem: Patient Care Overview  Goal: Plan of Care Review  Outcome: Ongoing (interventions implemented as appropriate)   07/05/18 0413   Coping/Psychosocial   Plan of Care Reviewed With patient   Plan of Care Review   Progress no change   OTHER   Outcome Summary Patient AOx4 this shift. Has rested well. Pain well controlled with PRN PO meds. No PRN labetalol needed so far this shift. Up with assist x2 last night. Patient complained of ankle pain while ambulating.        Problem: Fall Risk (Adult)  Goal: Identify Related Risk Factors and Signs and Symptoms  Outcome: Ongoing (interventions implemented as appropriate)      Problem: Laminectomy/Foraminotomy/Discectomy (Adult)  Goal: Signs and Symptoms of Listed Potential Problems Will be Absent, Minimized or Managed (Laminectomy/Foraminotomy/Discectomy)  Outcome: Ongoing (interventions implemented as appropriate)      Problem: Skin Injury Risk (Adult)  Goal: Identify Related Risk Factors and Signs and Symptoms  Outcome: Ongoing (interventions implemented as appropriate)    Goal: Skin Health and Integrity  Outcome: Outcome(s) achieved Date Met: 07/05/18

## 2018-07-05 NOTE — PROGRESS NOTES
"NEUROSURGERY PROGRESS NOTE    Vital Signs  Blood pressure (!) 183/79, pulse 80, temperature 98.7 °F (37.1 °C), temperature source Oral, resp. rate 12, height 182.9 cm (72.01\"), weight 117 kg (257 lb 15 oz), SpO2 94 %.    Interval History:   Patient to be discharged today.     Patient attempting BM when visited. No acute events overnight.       ASSESSMENT: POD #13: Laminectomy L3, L4, L5.       PLAN:   - discharge today if possible     Coco Solis PA-C  07/05/18  9:33 AM    "

## 2018-07-31 ENCOUNTER — OFFICE VISIT (OUTPATIENT)
Dept: NEUROSURGERY | Facility: CLINIC | Age: 73
End: 2018-07-31

## 2018-07-31 VITALS — WEIGHT: 257 LBS | HEIGHT: 72 IN | OXYGEN SATURATION: 99 % | RESPIRATION RATE: 18 BRPM | BODY MASS INDEX: 34.81 KG/M2

## 2018-07-31 DIAGNOSIS — M47.26 OSTEOARTHRITIS OF SPINE WITH RADICULOPATHY, LUMBAR REGION: ICD-10-CM

## 2018-07-31 DIAGNOSIS — I50.9 CONGESTIVE HEART FAILURE, UNSPECIFIED CONGESTIVE HEART FAILURE CHRONICITY, UNSPECIFIED CONGESTIVE HEART FAILURE TYPE: ICD-10-CM

## 2018-07-31 DIAGNOSIS — M47.816 SPONDYLOSIS OF LUMBAR REGION WITHOUT MYELOPATHY OR RADICULOPATHY: ICD-10-CM

## 2018-07-31 DIAGNOSIS — M51.36 DDD (DEGENERATIVE DISC DISEASE), LUMBAR: ICD-10-CM

## 2018-07-31 DIAGNOSIS — E66.8 MODERATE OBESITY: ICD-10-CM

## 2018-07-31 DIAGNOSIS — M47.896 OTHER OSTEOARTHRITIS OF SPINE, LUMBAR REGION: ICD-10-CM

## 2018-07-31 DIAGNOSIS — M47.16 SPONDYLOSIS WITH MYELOPATHY, LUMBAR REGION: Primary | ICD-10-CM

## 2018-07-31 DIAGNOSIS — M51.36 DEGENERATIVE DISC DISEASE, LUMBAR: ICD-10-CM

## 2018-07-31 DIAGNOSIS — E10.42 DIABETIC POLYNEUROPATHY ASSOCIATED WITH TYPE 1 DIABETES MELLITUS (HCC): ICD-10-CM

## 2018-07-31 PROBLEM — M48.062 NEUROGENIC CLAUDICATION DUE TO LUMBAR SPINAL STENOSIS: Status: ACTIVE | Noted: 2018-07-31

## 2018-07-31 PROBLEM — M54.41 CHRONIC BILATERAL LOW BACK PAIN WITH BILATERAL SCIATICA: Status: RESOLVED | Noted: 2018-04-26 | Resolved: 2018-07-31

## 2018-07-31 PROBLEM — M54.10 RADICULAR PAIN OF BOTH LOWER EXTREMITIES: Status: RESOLVED | Noted: 2018-04-26 | Resolved: 2018-07-31

## 2018-07-31 PROBLEM — M54.42 CHRONIC BILATERAL LOW BACK PAIN WITH BILATERAL SCIATICA: Status: RESOLVED | Noted: 2018-04-26 | Resolved: 2018-07-31

## 2018-07-31 PROBLEM — G89.29 CHRONIC BILATERAL LOW BACK PAIN WITH BILATERAL SCIATICA: Status: RESOLVED | Noted: 2018-04-26 | Resolved: 2018-07-31

## 2018-07-31 PROBLEM — R29.898 LEFT LEG WEAKNESS: Status: ACTIVE | Noted: 2018-07-31

## 2018-07-31 PROCEDURE — 99024 POSTOP FOLLOW-UP VISIT: CPT | Performed by: PHYSICIAN ASSISTANT

## 2018-07-31 RX ORDER — TRAMADOL HYDROCHLORIDE 50 MG/1
50 TABLET ORAL 4 TIMES DAILY
Qty: 60 TABLET | Refills: 1 | OUTPATIENT
Start: 2018-07-31 | End: 2019-01-10 | Stop reason: SDUPTHER

## 2018-07-31 NOTE — PROGRESS NOTES
Sai Main  1945 07/31/2018  [id number]    Chief Complaint   Patient presents with   • s/p Lumbar Lami L2-5     1 month f/u       HPI:  Sp laminectomy from L2-5 on 6/22/18. He has been in rehab for 1 month. He has progressed with his walking and using a rolling walker. Bladder function is at baseline. Residual weakness in the left foot is from prior to surgery.     Past Medical History:   Diagnosis Date   • Anemia    • Arthritis    • Bleeding disorder (CMS/[facility])    • CHF (congestive heart failure) (CMS/[facility])    • DDD (degenerative disc disease), lumbar    • Diabetes mellitus (CMS/[facility])    • DJD (degenerative joint disease), lumbar    • Foot drop    • Heart murmur    • Hernia    • History of transfusion    • Hypertension    • Low back pain    • Neurogenic claudication due to lumbar spinal stenosis    • Radiculopathy with lower extremity symptoms    • Spinal stenosis of lumbar region 2014   • Wears dentures     upper       Allergies   Allergen Reactions   • Ciprofloxacin Shortness Of Breath, Itching and Rash   • Levaquin [Levofloxacin] Shortness Of Breath, Itching and Rash     &  b/p problems   • Morphine And Related Hallucinations     Hallucinations, shortness of breath, b/p         Current Outpatient Prescriptions:   •  albuterol (PROVENTIL HFA;VENTOLIN HFA) 108 (90 Base) MCG/ACT inhaler, Inhale 2 puffs Every 4 (Four) Hours As Needed for Wheezing., Disp: , Rfl:   •  carvedilol (COREG) 3.125 MG tablet, Take 3.125 mg by mouth 2 (Two) Times a Day With Meals., Disp: , Rfl:   •  Ergocalciferol (VITAMIN D2) 400 units tablet, Take 1 tablet by mouth Daily., Disp: , Rfl:   •  finasteride (PROSCAR) 5 MG tablet, Take 5 mg by mouth Daily., Disp: , Rfl:   •  fluticasone (VERAMYST) 27.5 MCG/SPRAY nasal spray, 2 sprays into each nostril Daily., Disp: , Rfl:   •  folic acid (FOLVITE) 400 MCG tablet, Take 800 mcg by mouth Daily., Disp: , Rfl:   •  furosemide (LASIX) 20 MG tablet, Take 20 mg by mouth Daily., Disp: , Rfl:   •   gabapentin (NEURONTIN) 400 MG capsule, Take 1 capsule by mouth Every Night., Disp: 30 capsule, Rfl: 0  •  glucagon (GLUCAGON EMERGENCY) 1 MG injection, Inject 1 mg under the skin 1 (One) Time As Needed for Low Blood Sugar., Disp: , Rfl:   •  glyBURIDE-metFORMIN (GLUCOVANCE) 5-500 MG per tablet, Take 2 tablets by mouth 2 (Two) Times a Day With Meals., Disp: , Rfl:   •  guaiFENesin 200 MG tablet, Take 400 mg by mouth Every 4 (Four) Hours As Needed for Cough., Disp: , Rfl:   •  insulin NPH-insulin regular (humuLIN 70/30,novoLIN 70/30) (70-30) 100 UNIT/ML injection, Inject 70 Units under the skin 2 (Two) Times a Day With Meals. Unless blood sugars are good then adjusts, Disp: , Rfl:   •  ipratropium (ATROVENT) 0.06 % nasal spray, 2 sprays into each nostril 3 (Three) Times a Day., Disp: , Rfl:   •  lisinopril (PRINIVIL,ZESTRIL) 20 MG tablet, Take 20 mg by mouth Daily., Disp: , Rfl:   •  melatonin 5 MG tablet tablet, Take 5 mg by mouth Every Night., Disp: , Rfl:   •  nabumetone (RELAFEN) 750 MG tablet, 750 mg 2 (Two) Times a Day As Needed., Disp: , Rfl:   •  nitroglycerin (NITROSTAT) 0.4 MG SL tablet, Place 0.4 mg under the tongue Every 5 (Five) Minutes As Needed for Chest Pain. Take no more than 3 doses in 15 minutes. Has not use over 10 years, Disp: , Rfl:   •  O2 (OXYGEN), Inhale 3 L/min Every Night., Disp: , Rfl:   •  oxybutynin (DITROPAN) 5 MG tablet, Take 5 mg by mouth Every Night., Disp: , Rfl:   •  oxyCODONE-acetaminophen (PERCOCET) 5-325 MG per tablet, Take 1 tablet by mouth Every 4 (Four) Hours As Needed for Moderate Pain  or Severe Pain ., Disp: 45 tablet, Rfl: 0  •  pantoprazole (PROTONIX) 40 MG EC tablet, Take 40 mg by mouth 2 (Two) Times a Day., Disp: , Rfl:   •  Pediatric Multivit-Minerals-C (FLINTSTONES COMPLETE PO), Take 1 tablet by mouth Daily., Disp: , Rfl:   •  potassium gluconate 595 (99 K) MG tablet tablet, Take 595 mg by mouth Daily., Disp: , Rfl:   •  raNITIdine (ZANTAC) 150 MG tablet, Take 150 mg  by mouth 2 (Two) Times a Day., Disp: , Rfl:   •  sertraline (ZOLOFT) 100 MG tablet, Take 100 mg by mouth Every Morning., Disp: , Rfl:   •  simvastatin (ZOCOR) 80 MG tablet, 80 mg Every Night., Disp: , Rfl:   •  tamsulosin (FLOMAX) 0.4 MG capsule 24 hr capsule, Take 1 capsule by mouth 2 (Two) Times a Day., Disp: , Rfl:   •  vitamin A 51916 UNIT capsule, Take 8,000 Units by mouth Daily., Disp: , Rfl:   •  zolpidem (AMBIEN) 10 MG tablet, 10 mg At Night As Needed for Sleep., Disp: , Rfl:     Social History     Social History   • Marital status: Significant Other     Social History Main Topics   • Smoking status: Former Smoker     Types: Cigarettes   • Smokeless tobacco: Never Used      Comment: quit 10 years ago   • Alcohol use Yes      Comment: rarely   • Drug use: No   • Sexual activity: Defer     Other Topics Concern   • Not on file       Family History   Problem Relation Age of Onset   • Heart disease Mother    • Heart disease Father        Review of Systems   Constitutional: Positive for activity change. Negative for appetite change, chills, diaphoresis, fatigue, fever and unexpected weight change.   HENT: Negative for congestion, dental problem, drooling, ear discharge, ear pain, facial swelling, hearing loss, mouth sores, nosebleeds, postnasal drip, rhinorrhea, sinus pressure, sneezing, sore throat, tinnitus, trouble swallowing and voice change.    Eyes: Negative for photophobia, pain, discharge, redness, itching and visual disturbance.   Respiratory: Negative for apnea, cough, choking, chest tightness, shortness of breath, wheezing and stridor.    Cardiovascular: Negative for chest pain, palpitations and leg swelling.   Gastrointestinal: Negative for abdominal distention, abdominal pain, anal bleeding, blood in stool, constipation, diarrhea, nausea, rectal pain and vomiting.   Endocrine: Positive for cold intolerance. Negative for heat intolerance, polydipsia, polyphagia and polyuria.   Genitourinary: Positive  "for urgency. Negative for decreased urine volume, difficulty urinating, dysuria, enuresis, flank pain, frequency, genital sores and hematuria.   Musculoskeletal: Positive for back pain, joint swelling and myalgias. Negative for arthralgias, gait problem and neck stiffness.   Skin: Negative for color change, pallor, rash and wound.   Allergic/Immunologic: Negative for environmental allergies, food allergies and immunocompromised state.   Neurological: Positive for weakness. Negative for dizziness, tremors, seizures, syncope, facial asymmetry, speech difficulty, light-headedness, numbness and headaches.   Hematological: Negative for adenopathy. Does not bruise/bleed easily.   Psychiatric/Behavioral: Negative for agitation, behavioral problems, confusion, decreased concentration, dysphoric mood, hallucinations, self-injury, sleep disturbance and suicidal ideas. The patient is nervous/anxious. The patient is not hyperactive.    All other systems reviewed and are negative.      PE:  Physical Exam  Resp 18   Ht 182.9 cm (72.01\")   Wt 117 kg (257 lb)   SpO2 99%   BMI 34.85 kg/m²     Neurologic Exam  Motor intact in the legs to direct testing. Some residual weakness in the left foot and leg.    MDM  The patient has progressed well with inpatient rehabilitation and is ready for discharge home.  His girlfriend is with him today and we have reviewed his needs at home and they are fully prepared for his return to the home.  He will be able to attend outpatient physical therapy in [place].  We have discussed weaning his Percocet 5 from 4 times a day to twice a day and then he will use tramadol when necessary.  He reports that he is not having much in the way of pain.  He's had no back pain, he gets some arthritic pain especially in his ankles at this time, x-rays were done at the inpatient facility and are negative except for arthritis.   Our plan is outpatient physical therapy for the next month and we will see him again " in follow-up.    Malina Irving PA-C

## 2018-07-31 NOTE — TELEPHONE ENCOUNTER
Provider:  Pepe  Surgery:  Lumbar Lami L2-5  Surgery Date:  6/22/18  Last visit:   Today 7/31/18  Next visit: 8/28/18       Patient seen today-    Tramadol 50mg 1 tab QID PRN Pain- 1RF called in to pt's pharmacy per Kirti JAVED     Please sign off

## 2018-08-28 ENCOUNTER — OFFICE VISIT (OUTPATIENT)
Dept: NEUROSURGERY | Facility: CLINIC | Age: 73
End: 2018-08-28

## 2018-08-28 VITALS — RESPIRATION RATE: 19 BRPM | OXYGEN SATURATION: 99 % | BODY MASS INDEX: 32.02 KG/M2 | WEIGHT: 236.4 LBS | HEIGHT: 72 IN

## 2018-08-28 DIAGNOSIS — M51.36 DDD (DEGENERATIVE DISC DISEASE), LUMBAR: ICD-10-CM

## 2018-08-28 DIAGNOSIS — R53.81 PHYSICAL DECONDITIONING: ICD-10-CM

## 2018-08-28 DIAGNOSIS — M47.896 OTHER OSTEOARTHRITIS OF SPINE, LUMBAR REGION: ICD-10-CM

## 2018-08-28 DIAGNOSIS — M51.36 DEGENERATIVE DISC DISEASE, LUMBAR: ICD-10-CM

## 2018-08-28 DIAGNOSIS — Z74.09 IMPAIRED FUNCTIONAL MOBILITY, BALANCE, GAIT, AND ENDURANCE: ICD-10-CM

## 2018-08-28 DIAGNOSIS — R26.9 GAIT DISTURBANCE: ICD-10-CM

## 2018-08-28 DIAGNOSIS — E66.8 MODERATE OBESITY: Primary | ICD-10-CM

## 2018-08-28 DIAGNOSIS — R29.898 LEFT LEG WEAKNESS: ICD-10-CM

## 2018-08-28 DIAGNOSIS — G89.29 CHRONIC BILATERAL LOW BACK PAIN WITHOUT SCIATICA: ICD-10-CM

## 2018-08-28 DIAGNOSIS — M54.50 CHRONIC BILATERAL LOW BACK PAIN WITHOUT SCIATICA: ICD-10-CM

## 2018-08-28 DIAGNOSIS — M47.26 OSTEOARTHRITIS OF SPINE WITH RADICULOPATHY, LUMBAR REGION: ICD-10-CM

## 2018-08-28 DIAGNOSIS — M47.816 SPONDYLOSIS OF LUMBAR REGION WITHOUT MYELOPATHY OR RADICULOPATHY: ICD-10-CM

## 2018-08-28 PROCEDURE — 99024 POSTOP FOLLOW-UP VISIT: CPT | Performed by: PHYSICIAN ASSISTANT

## 2018-08-28 NOTE — PROGRESS NOTES
Sai Main  1945  08/28/2018  [id number]    Chief Complaint   Patient presents with   • 4 week follow up     HPI:  Patient is status post lumbar decompression for spinal stenosis and neurogenic claudication on 6/22/2018.  He had laminectomy from L2 through 5.  On today's follow-up he is participating with physical therapy and doing home exercises on a daily basis and progressing well.  He does have some low back pain which is not unexpected given the level of his degenerative disc and degenerative osteoarthritis of the lumbar spine.  Physical therapy is working with his balance and gait and he is very pleased with his progression.    Past Medical History:   Diagnosis Date   • Anemia    • Arthritis    • Bleeding disorder (CMS/[facility])    • CHF (congestive heart failure) (CMS/[facility])    • DDD (degenerative disc disease), lumbar    • Diabetes mellitus (CMS/[facility])    • DJD (degenerative joint disease), lumbar    • Foot drop    • Heart murmur    • Hernia    • History of transfusion    • Hypertension    • Low back pain    • Neurogenic claudication due to lumbar spinal stenosis    • Radiculopathy with lower extremity symptoms    • Spinal stenosis of lumbar region 2014   • Wears dentures     upper       Allergies   Allergen Reactions   • Ciprofloxacin Shortness Of Breath, Itching and Rash   • Levaquin [Levofloxacin] Shortness Of Breath, Itching and Rash     &  b/p problems   • Morphine And Related Hallucinations     Hallucinations, shortness of breath, b/p         Current Outpatient Prescriptions:   •  albuterol (PROVENTIL HFA;VENTOLIN HFA) 108 (90 Base) MCG/ACT inhaler, Inhale 2 puffs Every 4 (Four) Hours As Needed for Wheezing., Disp: , Rfl:   •  carvedilol (COREG) 3.125 MG tablet, Take 3.125 mg by mouth 2 (Two) Times a Day With Meals., Disp: , Rfl:   •  Ergocalciferol (VITAMIN D2) 400 units tablet, Take 1 tablet by mouth Daily., Disp: , Rfl:   •  finasteride (PROSCAR) 5 MG tablet, Take 5 mg by mouth Daily., Disp: , Rfl:    •  fluticasone (VERAMYST) 27.5 MCG/SPRAY nasal spray, 2 sprays into each nostril Daily., Disp: , Rfl:   •  folic acid (FOLVITE) 400 MCG tablet, Take 800 mcg by mouth Daily., Disp: , Rfl:   •  furosemide (LASIX) 20 MG tablet, Take 20 mg by mouth Daily., Disp: , Rfl:   •  gabapentin (NEURONTIN) 400 MG capsule, Take 1 capsule by mouth Every Night., Disp: 30 capsule, Rfl: 0  •  glucagon (GLUCAGON EMERGENCY) 1 MG injection, Inject 1 mg under the skin 1 (One) Time As Needed for Low Blood Sugar., Disp: , Rfl:   •  glyBURIDE-metFORMIN (GLUCOVANCE) 5-500 MG per tablet, Take 2 tablets by mouth 2 (Two) Times a Day With Meals., Disp: , Rfl:   •  guaiFENesin 200 MG tablet, Take 400 mg by mouth Every 4 (Four) Hours As Needed for Cough., Disp: , Rfl:   •  insulin NPH-insulin regular (humuLIN 70/30,novoLIN 70/30) (70-30) 100 UNIT/ML injection, Inject 70 Units under the skin 2 (Two) Times a Day With Meals. Unless blood sugars are good then adjusts, Disp: , Rfl:   •  ipratropium (ATROVENT) 0.06 % nasal spray, 2 sprays into each nostril 3 (Three) Times a Day., Disp: , Rfl:   •  lisinopril (PRINIVIL,ZESTRIL) 20 MG tablet, Take 20 mg by mouth Daily., Disp: , Rfl:   •  melatonin 5 MG tablet tablet, Take 5 mg by mouth Every Night., Disp: , Rfl:   •  nabumetone (RELAFEN) 750 MG tablet, 750 mg 2 (Two) Times a Day As Needed., Disp: , Rfl:   •  nitroglycerin (NITROSTAT) 0.4 MG SL tablet, Place 0.4 mg under the tongue Every 5 (Five) Minutes As Needed for Chest Pain. Take no more than 3 doses in 15 minutes. Has not use over 10 years, Disp: , Rfl:   •  O2 (OXYGEN), Inhale 3 L/min Every Night., Disp: , Rfl:   •  oxybutynin (DITROPAN) 5 MG tablet, Take 5 mg by mouth Every Night., Disp: , Rfl:   •  oxyCODONE-acetaminophen (PERCOCET) 5-325 MG per tablet, Take 1 tablet by mouth Every 4 (Four) Hours As Needed for Moderate Pain  or Severe Pain ., Disp: 45 tablet, Rfl: 0  •  pantoprazole (PROTONIX) 40 MG EC tablet, Take 40 mg by mouth 2 (Two) Times a  Day., Disp: , Rfl:   •  Pediatric Multivit-Minerals-C (FLINTSTONES COMPLETE PO), Take 1 tablet by mouth Daily., Disp: , Rfl:   •  potassium gluconate 595 (99 K) MG tablet tablet, Take 595 mg by mouth Daily., Disp: , Rfl:   •  raNITIdine (ZANTAC) 150 MG tablet, Take 150 mg by mouth 2 (Two) Times a Day., Disp: , Rfl:   •  sertraline (ZOLOFT) 100 MG tablet, Take 100 mg by mouth Every Morning., Disp: , Rfl:   •  simvastatin (ZOCOR) 80 MG tablet, 80 mg Every Night., Disp: , Rfl:   •  tamsulosin (FLOMAX) 0.4 MG capsule 24 hr capsule, Take 1 capsule by mouth 2 (Two) Times a Day., Disp: , Rfl:   •  traMADol (ULTRAM) 50 MG tablet, Take 1 tablet by mouth 4 (Four) Times a Day., Disp: 60 tablet, Rfl: 1  •  vitamin A 63447 UNIT capsule, Take 8,000 Units by mouth Daily., Disp: , Rfl:   •  zolpidem (AMBIEN) 10 MG tablet, 10 mg At Night As Needed for Sleep., Disp: , Rfl:     Social History     Social History   • Marital status: Significant Other     Social History Main Topics   • Smoking status: Former Smoker     Types: Cigarettes   • Smokeless tobacco: Never Used      Comment: quit 10 years ago   • Alcohol use Yes      Comment: rarely   • Drug use: No   • Sexual activity: Defer     Other Topics Concern   • Not on file       Family History   Problem Relation Age of Onset   • Heart disease Mother    • Heart disease Father        Review of Systems   Constitutional: Positive for fatigue. Negative for activity change, appetite change, chills, diaphoresis, fever and unexpected weight change.   HENT: Negative for congestion, dental problem, drooling, ear discharge, ear pain, facial swelling, hearing loss, mouth sores, nosebleeds, postnasal drip, rhinorrhea, sinus pressure, sneezing, sore throat, tinnitus, trouble swallowing and voice change.    Eyes: Negative for photophobia, pain, discharge, redness, itching and visual disturbance.   Respiratory: Negative for apnea, cough, choking, chest tightness, shortness of breath, wheezing and  "stridor.    Cardiovascular: Negative for chest pain, palpitations and leg swelling.   Gastrointestinal: Positive for constipation. Negative for abdominal distention, abdominal pain, anal bleeding, blood in stool, diarrhea, nausea, rectal pain and vomiting.   Endocrine: Negative for cold intolerance, heat intolerance, polydipsia, polyphagia and polyuria.   Genitourinary: Positive for urgency. Negative for decreased urine volume, difficulty urinating, dysuria, enuresis, flank pain, frequency, genital sores and hematuria.   Musculoskeletal: Positive for back pain and myalgias. Negative for arthralgias, gait problem, joint swelling, neck pain and neck stiffness.   Skin: Negative for color change, pallor, rash and wound.   Allergic/Immunologic: Negative for environmental allergies, food allergies and immunocompromised state.   Neurological: Positive for weakness. Negative for dizziness, tremors, seizures, syncope, facial asymmetry, speech difficulty, light-headedness, numbness and headaches.   Hematological: Negative for adenopathy. Does not bruise/bleed easily.   Psychiatric/Behavioral: Negative for agitation, behavioral problems, confusion, decreased concentration, dysphoric mood, hallucinations, self-injury, sleep disturbance and suicidal ideas. The patient is not nervous/anxious and is not hyperactive.    All other systems reviewed and are negative.      PE:  Physical Exam  Resp 19   Ht 182.9 cm (72.01\")   Wt 107 kg (236 lb 6.4 oz)   SpO2 99%   BMI 32.05 kg/m²   Surgical wound is well-healed.    Neurologic Exam  He has an antalgic gait favoring the left leg with weakness in the hip flexors and quad, his strength has improved since surgery.    TADEO Gibbs is making slow but steady progress with his physical therapy.  He is very pleased with how he is doing and he enjoys his physical therapist.  I have encouraged him to continue with his therapy program indefinitely.  He does have some back pain consistent with his " degenerative disc and degenerative joint disease.  He is not taking any narcotic medications.  Our recommendation is to continue physical therapy, continue to be active.  No formal follow-up is made at this time however I have recommended that if his back pain or leg pain were to worsen affecting his walking to give us a call.  Is been a pleasure providing neurosurgical care.  Malina Irving PA-C

## 2018-12-20 ENCOUNTER — TELEPHONE (OUTPATIENT)
Dept: NEUROSURGERY | Facility: CLINIC | Age: 73
End: 2018-12-20

## 2018-12-20 DIAGNOSIS — G89.29 CHRONIC BILATERAL LOW BACK PAIN WITH LEFT-SIDED SCIATICA: ICD-10-CM

## 2018-12-20 DIAGNOSIS — M54.16 LUMBAR RADICULOPATHY: Primary | ICD-10-CM

## 2018-12-20 DIAGNOSIS — M54.42 CHRONIC BILATERAL LOW BACK PAIN WITH LEFT-SIDED SCIATICA: ICD-10-CM

## 2019-01-10 ENCOUNTER — HOSPITAL ENCOUNTER (OUTPATIENT)
Dept: NEUROLOGY | Facility: HOSPITAL | Age: 74
Discharge: HOME | End: 2019-01-10
Admitting: PHYSICIAN ASSISTANT

## 2019-01-10 ENCOUNTER — HOSPITAL ENCOUNTER (OUTPATIENT)
Dept: MRI IMAGING | Facility: HOSPITAL | Age: 74
Discharge: HOME | End: 2019-01-10

## 2019-01-10 ENCOUNTER — DOCUMENTATION (OUTPATIENT)
Dept: NEUROSURGERY | Facility: CLINIC | Age: 74
End: 2019-01-10

## 2019-01-10 ENCOUNTER — OFFICE VISIT (OUTPATIENT)
Dept: NEUROSURGERY | Facility: CLINIC | Age: 74
End: 2019-01-10

## 2019-01-10 VITALS
DIASTOLIC BLOOD PRESSURE: 50 MMHG | TEMPERATURE: 97.5 F | BODY MASS INDEX: 31.56 KG/M2 | HEIGHT: 72 IN | SYSTOLIC BLOOD PRESSURE: 110 MMHG | WEIGHT: 233 LBS

## 2019-01-10 DIAGNOSIS — M51.36 DEGENERATIVE DISC DISEASE, LUMBAR: ICD-10-CM

## 2019-01-10 DIAGNOSIS — G89.29 CHRONIC LEFT-SIDED LOW BACK PAIN WITH LEFT-SIDED SCIATICA: ICD-10-CM

## 2019-01-10 DIAGNOSIS — M47.816 SPONDYLOSIS OF LUMBAR REGION WITHOUT MYELOPATHY OR RADICULOPATHY: ICD-10-CM

## 2019-01-10 DIAGNOSIS — M51.36 DDD (DEGENERATIVE DISC DISEASE), LUMBAR: ICD-10-CM

## 2019-01-10 DIAGNOSIS — M54.16 LUMBAR RADICULOPATHY: ICD-10-CM

## 2019-01-10 DIAGNOSIS — R29.898 LEFT LEG WEAKNESS: ICD-10-CM

## 2019-01-10 DIAGNOSIS — M21.372 FOOT DROP, LEFT: ICD-10-CM

## 2019-01-10 DIAGNOSIS — M19.90 ARTHRITIS: ICD-10-CM

## 2019-01-10 DIAGNOSIS — M54.42 CHRONIC BILATERAL LOW BACK PAIN WITH LEFT-SIDED SCIATICA: ICD-10-CM

## 2019-01-10 DIAGNOSIS — M47.16 SPONDYLOSIS WITH MYELOPATHY, LUMBAR REGION: Primary | ICD-10-CM

## 2019-01-10 DIAGNOSIS — G89.29 CHRONIC BILATERAL LOW BACK PAIN WITH LEFT-SIDED SCIATICA: ICD-10-CM

## 2019-01-10 DIAGNOSIS — R26.9 GAIT DISTURBANCE: ICD-10-CM

## 2019-01-10 DIAGNOSIS — R53.81 PHYSICAL DECONDITIONING: ICD-10-CM

## 2019-01-10 DIAGNOSIS — Z91.81 AT HIGH RISK FOR FALLS: ICD-10-CM

## 2019-01-10 DIAGNOSIS — M54.42 CHRONIC LEFT-SIDED LOW BACK PAIN WITH LEFT-SIDED SCIATICA: ICD-10-CM

## 2019-01-10 DIAGNOSIS — M47.896 OTHER OSTEOARTHRITIS OF SPINE, LUMBAR REGION: ICD-10-CM

## 2019-01-10 DIAGNOSIS — M47.26 OSTEOARTHRITIS OF SPINE WITH RADICULOPATHY, LUMBAR REGION: ICD-10-CM

## 2019-01-10 DIAGNOSIS — Z74.09 IMPAIRED FUNCTIONAL MOBILITY, BALANCE, GAIT, AND ENDURANCE: ICD-10-CM

## 2019-01-10 DIAGNOSIS — M54.10 RADICULOPATHY WITH LOWER EXTREMITY SYMPTOMS: ICD-10-CM

## 2019-01-10 PROCEDURE — 95886 MUSC TEST DONE W/N TEST COMP: CPT

## 2019-01-10 PROCEDURE — 99214 OFFICE O/P EST MOD 30 MIN: CPT | Performed by: PHYSICIAN ASSISTANT

## 2019-01-10 PROCEDURE — 72158 MRI LUMBAR SPINE W/O & W/DYE: CPT

## 2019-01-10 PROCEDURE — 0 GADOBENATE DIMEGLUMINE 529 MG/ML SOLUTION: Performed by: PHYSICIAN ASSISTANT

## 2019-01-10 PROCEDURE — 95910 NRV CNDJ TEST 7-8 STUDIES: CPT

## 2019-01-10 PROCEDURE — A9577 INJ MULTIHANCE: HCPCS | Performed by: PHYSICIAN ASSISTANT

## 2019-01-10 PROCEDURE — 82565 ASSAY OF CREATININE: CPT

## 2019-01-10 RX ORDER — TRAMADOL HYDROCHLORIDE 50 MG/1
50 TABLET ORAL 4 TIMES DAILY
Qty: 60 TABLET | Refills: 2 | Status: ON HOLD | OUTPATIENT
Start: 2019-01-10 | End: 2022-11-30

## 2019-01-10 RX ADMIN — GADOBENATE DIMEGLUMINE 15 ML: 529 INJECTION, SOLUTION INTRAVENOUS at 10:22

## 2019-01-10 NOTE — PROGRESS NOTES
Office F/U Visit  Subjective   Patient ID: Sai Main is a 73 y.o. male  [id number]    Chief Complaint   Patient presents with   • Back Pain   left leg pain,  L foot drop, low back pain and right lower back pain.    History of Present Illness   74 yo WM s/p lumbar laminectomy from L2- L5 on 6/22/2018 for a high-grade spinal stenosis.  After surgery the patient went through inpatient rehabilitation and has been continuing with outpatient rehabilitation. He reports today with chronic left foot drop, left calf pain, numbness in the bottom of the left foot, low back pain and right sided low back pain that radiates to the right buttock. With walking he has left leg pain that radiates down the left leg to the great toe. He got a custom brace for the left foot but it is causing more left leg pain and he usually can't wear the device. He is not currently in PT. He is having difficulty getting comfort with sitting and laying. He is currently taking neurontin 600 in the am and 1500 at hs without side effects. He does not take narcotics, drink or smoke. He has a new MRI for review.    Past Medical History:   Diagnosis Date   • Anemia    • Arthritis    • Bleeding disorder (CMS/HCC)    • CHF (congestive heart failure) (CMS/HCC)    • DDD (degenerative disc disease), lumbar    • Diabetes mellitus (CMS/HCC)    • DJD (degenerative joint disease), lumbar    • Foot drop    • Heart murmur    • Hernia    • History of transfusion    • Hypertension    • Low back pain    • Neurogenic claudication due to lumbar spinal stenosis    • Radiculopathy with lower extremity symptoms    • Spinal stenosis of lumbar region 2014   • Wears dentures     upper       Allergies   Allergen Reactions   • Ciprofloxacin Shortness Of Breath, Itching and Rash   • Levaquin [Levofloxacin] Shortness Of Breath, Itching and Rash     &  b/p problems   • Morphine And Related Hallucinations     Hallucinations, shortness of breath, b/p       Current Outpatient  Medications:   •  albuterol (PROVENTIL HFA;VENTOLIN HFA) 108 (90 Base) MCG/ACT inhaler, Inhale 2 puffs Every 4 (Four) Hours As Needed for Wheezing., Disp: , Rfl:   •  carvedilol (COREG) 3.125 MG tablet, Take 3.125 mg by mouth 2 (Two) Times a Day With Meals., Disp: , Rfl:   •  Ergocalciferol (VITAMIN D2) 400 units tablet, Take 1 tablet by mouth Daily., Disp: , Rfl:   •  finasteride (PROSCAR) 5 MG tablet, Take 5 mg by mouth Daily., Disp: , Rfl:   •  fluticasone (VERAMYST) 27.5 MCG/SPRAY nasal spray, 2 sprays into each nostril Daily., Disp: , Rfl:   •  folic acid (FOLVITE) 400 MCG tablet, Take 800 mcg by mouth Daily., Disp: , Rfl:   •  furosemide (LASIX) 20 MG tablet, Take 20 mg by mouth Daily., Disp: , Rfl:   •  gabapentin (NEURONTIN) 400 MG capsule, Take 1 capsule by mouth Every Night., Disp: 30 capsule, Rfl: 0  •  glucagon (GLUCAGON EMERGENCY) 1 MG injection, Inject 1 mg under the skin 1 (One) Time As Needed for Low Blood Sugar., Disp: , Rfl:   •  glyBURIDE-metFORMIN (GLUCOVANCE) 5-500 MG per tablet, Take 2 tablets by mouth 2 (Two) Times a Day With Meals., Disp: , Rfl:   •  guaiFENesin 200 MG tablet, Take 400 mg by mouth Every 4 (Four) Hours As Needed for Cough., Disp: , Rfl:   •  insulin NPH-insulin regular (humuLIN 70/30,novoLIN 70/30) (70-30) 100 UNIT/ML injection, Inject 70 Units under the skin 2 (Two) Times a Day With Meals. Unless blood sugars are good then adjusts, Disp: , Rfl:   •  ipratropium (ATROVENT) 0.06 % nasal spray, 2 sprays into each nostril 3 (Three) Times a Day., Disp: , Rfl:   •  lisinopril (PRINIVIL,ZESTRIL) 20 MG tablet, Take 20 mg by mouth Daily., Disp: , Rfl:   •  melatonin 5 MG tablet tablet, Take 5 mg by mouth Every Night., Disp: , Rfl:   •  nabumetone (RELAFEN) 750 MG tablet, 750 mg 2 (Two) Times a Day As Needed., Disp: , Rfl:   •  nitroglycerin (NITROSTAT) 0.4 MG SL tablet, Place 0.4 mg under the tongue Every 5 (Five) Minutes As Needed for Chest Pain. Take no more than 3 doses in 15  minutes. Has not use over 10 years, Disp: , Rfl:   •  O2 (OXYGEN), Inhale 3 L/min Every Night., Disp: , Rfl:   •  oxybutynin (DITROPAN) 5 MG tablet, Take 5 mg by mouth Every Night., Disp: , Rfl:   •  oxyCODONE-acetaminophen (PERCOCET) 5-325 MG per tablet, Take 1 tablet by mouth Every 4 (Four) Hours As Needed for Moderate Pain  or Severe Pain ., Disp: 45 tablet, Rfl: 0  •  pantoprazole (PROTONIX) 40 MG EC tablet, Take 40 mg by mouth 2 (Two) Times a Day., Disp: , Rfl:   •  Pediatric Multivit-Minerals-C (FLINTSTONES COMPLETE PO), Take 1 tablet by mouth Daily., Disp: , Rfl:   •  potassium gluconate 595 (99 K) MG tablet tablet, Take 595 mg by mouth Daily., Disp: , Rfl:   •  raNITIdine (ZANTAC) 150 MG tablet, Take 150 mg by mouth 2 (Two) Times a Day., Disp: , Rfl:   •  sertraline (ZOLOFT) 100 MG tablet, Take 100 mg by mouth Every Morning., Disp: , Rfl:   •  simvastatin (ZOCOR) 80 MG tablet, 80 mg Every Night., Disp: , Rfl:   •  tamsulosin (FLOMAX) 0.4 MG capsule 24 hr capsule, Take 1 capsule by mouth 2 (Two) Times a Day., Disp: , Rfl:   •  traMADol (ULTRAM) 50 MG tablet, Take 1 tablet by mouth 4 (Four) Times a Day., Disp: 60 tablet, Rfl: 1  •  vitamin A 48126 UNIT capsule, Take 8,000 Units by mouth Daily., Disp: , Rfl:   •  zolpidem (AMBIEN) 10 MG tablet, 10 mg At Night As Needed for Sleep., Disp: , Rfl:   No current facility-administered medications for this visit.   Social History     Tobacco Use   • Smoking status: Former Smoker     Types: Cigarettes   • Smokeless tobacco: Never Used   • Tobacco comment: quit 10 years ago   Substance Use Topics   • Alcohol use: Yes     Comment: rarely   • Drug use: No     Review of Systems   Constitutional: Positive for fatigue. Negative for activity change, appetite change, chills, diaphoresis, fever and unexpected weight change.   HENT: Negative for congestion, dental problem, drooling, ear discharge, ear pain, facial swelling, hearing loss, mouth sores, nosebleeds, postnasal drip,  "rhinorrhea, sinus pressure, sneezing, sore throat, tinnitus, trouble swallowing and voice change.    Eyes: Negative for photophobia, pain, discharge, redness, itching and visual disturbance.   Respiratory: Negative for apnea, cough, choking, chest tightness, shortness of breath, wheezing and stridor.    Cardiovascular: Negative for chest pain, palpitations and leg swelling.   Gastrointestinal: Positive for constipation. Negative for abdominal distention, abdominal pain, anal bleeding, blood in stool, diarrhea, nausea, rectal pain and vomiting.   Endocrine: Negative for cold intolerance, heat intolerance, polydipsia, polyphagia and polyuria.   Genitourinary: Positive for urgency. Negative for decreased urine volume, difficulty urinating, dysuria, enuresis, flank pain, frequency, genital sores and hematuria.   Musculoskeletal: Positive for back pain and myalgias. Negative for arthralgias, gait problem, joint swelling, neck pain and neck stiffness.   Skin: Negative for color change, pallor, rash and wound.   Allergic/Immunologic: Negative for environmental allergies, food allergies and immunocompromised state.   Neurological: Positive for weakness. Negative for dizziness, tremors, seizures, syncope, facial asymmetry, speech difficulty, light-headedness, numbness and headaches.   Hematological: Negative for adenopathy. Does not bruise/bleed easily.   Psychiatric/Behavioral: Negative for agitation, behavioral problems, confusion, decreased concentration, dysphoric mood, hallucinations, self-injury, sleep disturbance and suicidal ideas. The patient is not nervous/anxious and is not hyperactive.    All other systems reviewed and are negative.      Physical Exam   /50 (BP Location: Left arm, Patient Position: Sitting, Cuff Size: Adult)   Temp 97.5 °F (36.4 °C) (Temporal)   Ht 182.9 cm (72.01\")   Wt 106 kg (233 lb)   BMI 31.59 kg/m²     PE:  Well developed well-nourished, in no apparent distress at time of " examination.  Awake, alert, oriented, conversant.  Head-normocephalic, atraumatic  EENT-sclera clear, eyelids normal, mucous membranes normal  Neck-supple  Lungs-normal expansion, no wheezing  COR- RRR  EXT-no edema    Neurologic Exam  Left foot drop  SLR + for left leg pain  Intact reflexes  Generalized weakness in LE's    Independent Review of Radiographic Studies:   MRI L/S from today, reviewed and reveals  Multilevel DDD from L2-S1. Scar tissue is present from L2-5 from prior laminectomy. There is a stable anterolisthesis of L5 on S1.    Medical Decision Makin. Chronic left foot drop  2.  Chronic left leg pain  3.  Multilevel degenerative disc disease throughout the lumbar spine from L2-3 through L5-S1.  Anteriorlisthesis present of L5 on S1, stable from last scan.  Broad-based disc bulge with right neural foraminal stenosis at L3-4 and L4 5.  Postsurgical changes noted.  4.  Severe peripheral neuropathy  5. Chronic pain, will refer to pain management for injection and pain stimulator trial.    20 Minutes spent performing a face-to-face diagnostic evaluation, neurosurgical decision making to assess and support the plan of care, risk and benefits of treatment, options and coordination of care with the patient    Kirti Irving PAC

## 2019-01-10 NOTE — PROGRESS NOTES
Called in tramadol and gabapentin 600mg gabapentin 300mg to pharmacy on file.    Ree Hui, CMA: 2:59 PM

## 2019-01-11 PROBLEM — G93.41 METABOLIC ENCEPHALOPATHY: Status: RESOLVED | Noted: 2018-06-27 | Resolved: 2019-01-11

## 2019-01-14 LAB — CREAT BLDA-MCNC: 1.4 MG/DL (ref 0.6–1.3)

## 2019-01-16 ENCOUNTER — DOCUMENTATION (OUTPATIENT)
Dept: NEUROSURGERY | Facility: CLINIC | Age: 74
End: 2019-01-16

## 2019-08-26 ENCOUNTER — TELEPHONE (OUTPATIENT)
Dept: INTERNAL MEDICINE | Facility: CLINIC | Age: 74
End: 2019-08-26

## 2022-11-30 ENCOUNTER — ANESTHESIA EVENT (OUTPATIENT)
Dept: PERIOP | Facility: HOSPITAL | Age: 77
End: 2022-11-30

## 2022-11-30 ENCOUNTER — ANESTHESIA (OUTPATIENT)
Dept: PERIOP | Facility: HOSPITAL | Age: 77
End: 2022-11-30

## 2022-11-30 ENCOUNTER — HOSPITAL ENCOUNTER (OUTPATIENT)
Facility: HOSPITAL | Age: 77
Setting detail: HOSPITAL OUTPATIENT SURGERY
Discharge: HOME | End: 2022-11-30
Attending: UROLOGY | Admitting: UROLOGY

## 2022-11-30 VITALS
HEART RATE: 80 BPM | OXYGEN SATURATION: 91 % | RESPIRATION RATE: 18 BRPM | DIASTOLIC BLOOD PRESSURE: 72 MMHG | SYSTOLIC BLOOD PRESSURE: 134 MMHG | TEMPERATURE: 97.8 F

## 2022-11-30 DIAGNOSIS — N13.8 BPH WITH OBSTRUCTION/LOWER URINARY TRACT SYMPTOMS: Primary | ICD-10-CM

## 2022-11-30 DIAGNOSIS — N40.1 BPH WITH OBSTRUCTION/LOWER URINARY TRACT SYMPTOMS: Primary | ICD-10-CM

## 2022-11-30 LAB
ANION GAP SERPL CALCULATED.3IONS-SCNC: 11 MMOL/L (ref 5–15)
BUN SERPL-MCNC: 19 MG/DL (ref 8–23)
BUN/CREAT SERPL: 16.7 (ref 7–25)
CALCIUM SPEC-SCNC: 9.4 MG/DL (ref 8.6–10.5)
CHLORIDE SERPL-SCNC: 103 MMOL/L (ref 98–107)
CO2 SERPL-SCNC: 25 MMOL/L (ref 22–29)
CREAT SERPL-MCNC: 1.14 MG/DL (ref 0.76–1.27)
DEPRECATED RDW RBC AUTO: 42.4 FL (ref 37–54)
EGFRCR SERPLBLD CKD-EPI 2021: 66.2 ML/MIN/1.73
ERYTHROCYTE [DISTWIDTH] IN BLOOD BY AUTOMATED COUNT: 13.1 % (ref 12.3–15.4)
GLUCOSE BLDC GLUCOMTR-MCNC: 106 MG/DL (ref 70–130)
GLUCOSE BLDC GLUCOMTR-MCNC: 136 MG/DL (ref 70–130)
GLUCOSE SERPL-MCNC: 145 MG/DL (ref 65–99)
HCT VFR BLD AUTO: 28.9 % (ref 37.5–51)
HGB BLD-MCNC: 9.4 G/DL (ref 13–17.7)
MCH RBC QN AUTO: 29.2 PG (ref 26.6–33)
MCHC RBC AUTO-ENTMCNC: 32.5 G/DL (ref 31.5–35.7)
MCV RBC AUTO: 89.8 FL (ref 79–97)
PLATELET # BLD AUTO: 263 10*3/MM3 (ref 140–450)
PMV BLD AUTO: 8.9 FL (ref 6–12)
POTASSIUM SERPL-SCNC: 4.1 MMOL/L (ref 3.5–5.2)
QT INTERVAL: 406 MS
QTC INTERVAL: 435 MS
RBC # BLD AUTO: 3.22 10*6/MM3 (ref 4.14–5.8)
SODIUM SERPL-SCNC: 139 MMOL/L (ref 136–145)
WBC NRBC COR # BLD: 4.43 10*3/MM3 (ref 3.4–10.8)

## 2022-11-30 PROCEDURE — 25010000002 DEXAMETHASONE PER 1 MG: Performed by: NURSE ANESTHETIST, CERTIFIED REGISTERED

## 2022-11-30 PROCEDURE — 25010000002 NEOSTIGMINE 10 MG/10ML SOLUTION: Performed by: NURSE ANESTHETIST, CERTIFIED REGISTERED

## 2022-11-30 PROCEDURE — 25010000002 ONDANSETRON PER 1 MG: Performed by: NURSE ANESTHETIST, CERTIFIED REGISTERED

## 2022-11-30 PROCEDURE — 80048 BASIC METABOLIC PNL TOTAL CA: CPT | Performed by: ANESTHESIOLOGY

## 2022-11-30 PROCEDURE — 82962 GLUCOSE BLOOD TEST: CPT

## 2022-11-30 PROCEDURE — 85027 COMPLETE CBC AUTOMATED: CPT | Performed by: ANESTHESIOLOGY

## 2022-11-30 PROCEDURE — 25010000002 GENTAMICIN PER 80 MG: Performed by: UROLOGY

## 2022-11-30 PROCEDURE — 93005 ELECTROCARDIOGRAM TRACING: CPT | Performed by: ANESTHESIOLOGY

## 2022-11-30 PROCEDURE — 93010 ELECTROCARDIOGRAM REPORT: CPT | Performed by: INTERNAL MEDICINE

## 2022-11-30 RX ORDER — ROCURONIUM BROMIDE 10 MG/ML
INJECTION, SOLUTION INTRAVENOUS AS NEEDED
Status: DISCONTINUED | OUTPATIENT
Start: 2022-11-30 | End: 2022-11-30 | Stop reason: SURG

## 2022-11-30 RX ORDER — SODIUM CHLORIDE 9 MG/ML
1000 INJECTION, SOLUTION INTRAVENOUS CONTINUOUS
Status: DISCONTINUED | OUTPATIENT
Start: 2022-11-30 | End: 2022-11-30 | Stop reason: HOSPADM

## 2022-11-30 RX ORDER — MAGNESIUM HYDROXIDE 1200 MG/15ML
LIQUID ORAL AS NEEDED
Status: DISCONTINUED | OUTPATIENT
Start: 2022-11-30 | End: 2022-11-30 | Stop reason: HOSPADM

## 2022-11-30 RX ORDER — FENTANYL CITRATE 50 UG/ML
50 INJECTION, SOLUTION INTRAMUSCULAR; INTRAVENOUS
Status: DISCONTINUED | OUTPATIENT
Start: 2022-11-30 | End: 2022-11-30 | Stop reason: HOSPADM

## 2022-11-30 RX ORDER — SODIUM CHLORIDE 0.9 % (FLUSH) 0.9 %
10 SYRINGE (ML) INJECTION AS NEEDED
Status: DISCONTINUED | OUTPATIENT
Start: 2022-11-30 | End: 2022-11-30 | Stop reason: HOSPADM

## 2022-11-30 RX ORDER — DEXAMETHASONE SODIUM PHOSPHATE 4 MG/ML
INJECTION, SOLUTION INTRA-ARTICULAR; INTRALESIONAL; INTRAMUSCULAR; INTRAVENOUS; SOFT TISSUE AS NEEDED
Status: DISCONTINUED | OUTPATIENT
Start: 2022-11-30 | End: 2022-11-30 | Stop reason: SURG

## 2022-11-30 RX ORDER — LIDOCAINE HYDROCHLORIDE 10 MG/ML
0.5 INJECTION, SOLUTION EPIDURAL; INFILTRATION; INTRACAUDAL; PERINEURAL ONCE AS NEEDED
Status: COMPLETED | OUTPATIENT
Start: 2022-11-30 | End: 2022-11-30

## 2022-11-30 RX ORDER — SULFAMETHOXAZOLE AND TRIMETHOPRIM 400; 80 MG/1; MG/1
1 TABLET ORAL 2 TIMES DAILY
Qty: 20 TABLET | Refills: 0 | Status: SHIPPED | OUTPATIENT
Start: 2022-11-30

## 2022-11-30 RX ORDER — LIDOCAINE HYDROCHLORIDE 10 MG/ML
INJECTION, SOLUTION EPIDURAL; INFILTRATION; INTRACAUDAL; PERINEURAL AS NEEDED
Status: DISCONTINUED | OUTPATIENT
Start: 2022-11-30 | End: 2022-11-30 | Stop reason: SURG

## 2022-11-30 RX ORDER — HYDROCODONE BITARTRATE AND ACETAMINOPHEN 5; 325 MG/1; MG/1
1 TABLET ORAL EVERY 6 HOURS PRN
COMMUNITY

## 2022-11-30 RX ORDER — HYDROCODONE BITARTRATE AND ACETAMINOPHEN 7.5; 325 MG/1; MG/1
1 TABLET ORAL ONCE AS NEEDED
Status: DISCONTINUED | OUTPATIENT
Start: 2022-11-30 | End: 2022-11-30

## 2022-11-30 RX ORDER — SODIUM CHLORIDE, SODIUM LACTATE, POTASSIUM CHLORIDE, CALCIUM CHLORIDE 600; 310; 30; 20 MG/100ML; MG/100ML; MG/100ML; MG/100ML
9 INJECTION, SOLUTION INTRAVENOUS CONTINUOUS
Status: DISCONTINUED | OUTPATIENT
Start: 2022-11-30 | End: 2022-11-30 | Stop reason: HOSPADM

## 2022-11-30 RX ORDER — AMLODIPINE BESYLATE 5 MG/1
5 TABLET ORAL DAILY
COMMUNITY

## 2022-11-30 RX ORDER — GENTAMICIN SULFATE 80 MG/100ML
80 INJECTION, SOLUTION INTRAVENOUS ONCE
Status: COMPLETED | OUTPATIENT
Start: 2022-11-30 | End: 2022-11-30

## 2022-11-30 RX ORDER — HYDROCODONE BITARTRATE AND ACETAMINOPHEN 10; 325 MG/1; MG/1
1 TABLET ORAL EVERY 6 HOURS PRN
Qty: 20 TABLET | Refills: 0 | Status: SHIPPED | OUTPATIENT
Start: 2022-11-30

## 2022-11-30 RX ORDER — HYDROCODONE BITARTRATE AND ACETAMINOPHEN 10; 325 MG/1; MG/1
TABLET ORAL
Status: DISCONTINUED
Start: 2022-11-30 | End: 2022-11-30 | Stop reason: WASHOUT

## 2022-11-30 RX ORDER — FAMOTIDINE 10 MG/ML
20 INJECTION, SOLUTION INTRAVENOUS
Status: COMPLETED | OUTPATIENT
Start: 2022-11-30 | End: 2022-11-30

## 2022-11-30 RX ORDER — ONDANSETRON 2 MG/ML
INJECTION INTRAMUSCULAR; INTRAVENOUS AS NEEDED
Status: DISCONTINUED | OUTPATIENT
Start: 2022-11-30 | End: 2022-11-30 | Stop reason: SURG

## 2022-11-30 RX ORDER — ONDANSETRON 2 MG/ML
4 INJECTION INTRAMUSCULAR; INTRAVENOUS ONCE AS NEEDED
Status: DISCONTINUED | OUTPATIENT
Start: 2022-11-30 | End: 2022-11-30 | Stop reason: HOSPADM

## 2022-11-30 RX ORDER — FAMOTIDINE 20 MG/1
20 TABLET, FILM COATED ORAL
Status: COMPLETED | OUTPATIENT
Start: 2022-11-30 | End: 2022-11-30

## 2022-11-30 RX ORDER — INSULIN LISPRO 100 [IU]/ML
0-9 INJECTION, SOLUTION INTRAVENOUS; SUBCUTANEOUS
Status: DISCONTINUED | OUTPATIENT
Start: 2022-11-30 | End: 2022-11-30 | Stop reason: HOSPADM

## 2022-11-30 RX ORDER — HYDROMORPHONE HYDROCHLORIDE 1 MG/ML
0.5 INJECTION, SOLUTION INTRAMUSCULAR; INTRAVENOUS; SUBCUTANEOUS
Status: DISCONTINUED | OUTPATIENT
Start: 2022-11-30 | End: 2022-11-30 | Stop reason: HOSPADM

## 2022-11-30 RX ORDER — FAMOTIDINE 40 MG/1
40 TABLET, FILM COATED ORAL DAILY
COMMUNITY

## 2022-11-30 RX ORDER — GLYCOPYRROLATE 0.2 MG/ML
INJECTION INTRAMUSCULAR; INTRAVENOUS AS NEEDED
Status: DISCONTINUED | OUTPATIENT
Start: 2022-11-30 | End: 2022-11-30 | Stop reason: SURG

## 2022-11-30 RX ORDER — NEOSTIGMINE METHYLSULFATE 1 MG/ML
INJECTION, SOLUTION INTRAVENOUS AS NEEDED
Status: DISCONTINUED | OUTPATIENT
Start: 2022-11-30 | End: 2022-11-30 | Stop reason: SURG

## 2022-11-30 RX ORDER — PHENYLEPHRINE HCL IN 0.9% NACL 1 MG/10 ML
SYRINGE (ML) INTRAVENOUS AS NEEDED
Status: DISCONTINUED | OUTPATIENT
Start: 2022-11-30 | End: 2022-11-30 | Stop reason: SURG

## 2022-11-30 RX ORDER — MULTIPLE VITAMINS W/ MINERALS TAB 9MG-400MCG
1 TAB ORAL DAILY
COMMUNITY

## 2022-11-30 RX ORDER — HYDROCODONE BITARTRATE AND ACETAMINOPHEN 5; 325 MG/1; MG/1
TABLET ORAL
Status: COMPLETED
Start: 2022-11-30 | End: 2022-11-30

## 2022-11-30 RX ORDER — GABAPENTIN 400 MG/1
400 CAPSULE ORAL 3 TIMES DAILY
COMMUNITY

## 2022-11-30 RX ORDER — HYDROCODONE BITARTRATE AND ACETAMINOPHEN 5; 325 MG/1; MG/1
1 TABLET ORAL ONCE AS NEEDED
Status: COMPLETED | OUTPATIENT
Start: 2022-11-30 | End: 2022-11-30

## 2022-11-30 RX ADMIN — Medication 50 MCG: at 13:14

## 2022-11-30 RX ADMIN — GENTAMICIN SULFATE 80 MG: 80 INJECTION, SOLUTION INTRAVENOUS at 13:02

## 2022-11-30 RX ADMIN — HYDROCODONE BITARTRATE AND ACETAMINOPHEN 1 TABLET: 5; 325 TABLET ORAL at 15:17

## 2022-11-30 RX ADMIN — LIDOCAINE HYDROCHLORIDE 40 MG: 10 INJECTION, SOLUTION EPIDURAL; INFILTRATION; INTRACAUDAL; PERINEURAL at 12:58

## 2022-11-30 RX ADMIN — DEXAMETHASONE SODIUM PHOSPHATE 4 MG: 4 INJECTION, SOLUTION INTRAMUSCULAR; INTRAVENOUS at 13:04

## 2022-11-30 RX ADMIN — Medication 100 MCG: at 13:19

## 2022-11-30 RX ADMIN — NEOSTIGMINE 2.5 MG: 1 INJECTION INTRAVENOUS at 13:44

## 2022-11-30 RX ADMIN — SODIUM CHLORIDE, POTASSIUM CHLORIDE, SODIUM LACTATE AND CALCIUM CHLORIDE 9 ML/HR: 600; 310; 30; 20 INJECTION, SOLUTION INTRAVENOUS at 12:11

## 2022-11-30 RX ADMIN — GLYCOPYRROLATE 0.4 MCG: 0.2 INJECTION INTRAMUSCULAR; INTRAVENOUS at 13:44

## 2022-11-30 RX ADMIN — ONDANSETRON 4 MG: 2 INJECTION INTRAMUSCULAR; INTRAVENOUS at 13:44

## 2022-11-30 RX ADMIN — LIDOCAINE HYDROCHLORIDE 0.5 ML: 10 INJECTION, SOLUTION EPIDURAL; INFILTRATION; INTRACAUDAL; PERINEURAL at 12:10

## 2022-11-30 RX ADMIN — FAMOTIDINE 20 MG: 20 TABLET, FILM COATED ORAL at 12:10

## 2022-11-30 RX ADMIN — Medication 100 MCG: at 13:16

## 2022-11-30 RX ADMIN — ROCURONIUM BROMIDE 50 MG: 10 INJECTION INTRAVENOUS at 12:58

## 2024-05-21 ENCOUNTER — TRANSCRIBE ORDERS (OUTPATIENT)
Dept: ADMINISTRATIVE | Facility: HOSPITAL | Age: 79
End: 2024-05-21
Payer: MEDICARE

## 2024-05-21 DIAGNOSIS — R10.32 ABDOMINAL PAIN, LEFT LOWER QUADRANT: Primary | ICD-10-CM

## 2024-06-05 ENCOUNTER — HOSPITAL ENCOUNTER (OUTPATIENT)
Dept: CT IMAGING | Facility: HOSPITAL | Age: 79
Discharge: HOME | End: 2024-06-05
Admitting: NURSE PRACTITIONER
Payer: MEDICARE

## 2024-06-05 DIAGNOSIS — R10.32 ABDOMINAL PAIN, LEFT LOWER QUADRANT: ICD-10-CM

## 2024-06-05 PROCEDURE — 74176 CT ABD & PELVIS W/O CONTRAST: CPT

## (undated) DEVICE — ENCORE® LATEX MICRO SIZE 7.5, STERILE LATEX POWDER-FREE SURGICAL GLOVE: Brand: ENCORE

## (undated) DEVICE — GOWN,REINF,POLY,ECL,PP SLV,XL: Brand: MEDLINE

## (undated) DEVICE — TOOL 14MH30 LEGEND 14CM 3MM: Brand: MIDAS REX ™

## (undated) DEVICE — ULTRACLEAN ACCESSORY ELECTRODE 4" (10.16 CM) COATED BLADE: Brand: ULTRACLEAN

## (undated) DEVICE — DEFOGGER!" ANTI FOG KIT: Brand: DEROYAL

## (undated) DEVICE — ANTIBACTERIAL UNDYED BRAIDED (POLYGLACTIN 910), SYNTHETIC ABSORBABLE SUTURE: Brand: COATED VICRYL

## (undated) DEVICE — ST PRIM GRVTY NDLESS 3 INJ PORT 105IN

## (undated) DEVICE — GLV SURG SENSICARE PI MIC PF SZ8 LF STRL

## (undated) DEVICE — AIRWY 90MM NO9

## (undated) DEVICE — MEDI-VAC YANKAUER SUCTION HANDLE W/BULBOUS TIP: Brand: CARDINAL HEALTH

## (undated) DEVICE — ADAPT ST INFUS ADMIN SYR 70IN

## (undated) DEVICE — ENCORE® LATEX MICRO SIZE 6.5, STERILE LATEX POWDER-FREE SURGICAL GLOVE: Brand: ENCORE

## (undated) DEVICE — DISPOSABLE IRRIGATION BIPOLAR CORD, M1000 TYPE: Brand: KIRWAN

## (undated) DEVICE — SYS SKIN CLS DERMABOND PRINEO W/22CM MESH TP

## (undated) DEVICE — HDRST INTUB GENTLETOUCH SLOT 7IN RT

## (undated) DEVICE — SPNG GZ WOVN 4X4IN 12PLY 10/BX STRL

## (undated) DEVICE — TP CLTH ADH PORUS 3IN 10YD

## (undated) DEVICE — CATH FOL CONT IRR 3WY 20F 5CC

## (undated) DEVICE — ADHS LIQ MASTISOL 2/3ML

## (undated) DEVICE — ADHESIVE ISLAND DRESSING: Brand: TELFA

## (undated) DEVICE — MEDI-VAC NON-CONDUCTIVE SUCTION TUBING: Brand: CARDINAL HEALTH

## (undated) DEVICE — ENCORE® LATEX MICRO SIZE 7, STERILE LATEX POWDER-FREE SURGICAL GLOVE: Brand: ENCORE

## (undated) DEVICE — SPNG GZ STRL 2S 4X4 12PLY

## (undated) DEVICE — 3M™ STERI-DRAPE™ INSTRUMENT POUCH 1018: Brand: STERI-DRAPE™

## (undated) DEVICE — PK CYSTO-TUR BASIC 10

## (undated) DEVICE — 2963 MEDIPORE SOFT CLOTH TAPE 3 IN X 10 YD 12 RLS/CS: Brand: 3M™ MEDIPORE™

## (undated) DEVICE — SOL LR 1000ML

## (undated) DEVICE — 3M™ STERI-STRIP™ REINFORCED ADHESIVE SKIN CLOSURES, R1547, 1/2 IN X 4 IN (12 MM X 100 MM), 6 STRIPS/ENVELOPE: Brand: 3M™ STERI-STRIP™

## (undated) DEVICE — CANNULA,OXY,ADULT,SUPERSOFT,W/7'TUB,UC: Brand: MEDLINE

## (undated) DEVICE — BLANKT WARM UPPR/BDY ARM/OUT 57X196CM

## (undated) DEVICE — PK NEURO DISC 10

## (undated) DEVICE — NEURO SPONGES: Brand: DEROYAL

## (undated) DEVICE — DRAINBAG,ANTI-REFLUX TOWER,L/F,2000ML,LL: Brand: MEDLINE

## (undated) DEVICE — ELECTRD BLD EZ CLN STD 2.5IN

## (undated) DEVICE — ACCY PA700 LUBRICANT DIFFUSER MR7 4 PACK: Brand: MIDAS REX